# Patient Record
Sex: MALE | Race: BLACK OR AFRICAN AMERICAN | Employment: UNEMPLOYED | URBAN - NONMETROPOLITAN AREA
[De-identification: names, ages, dates, MRNs, and addresses within clinical notes are randomized per-mention and may not be internally consistent; named-entity substitution may affect disease eponyms.]

---

## 2018-09-13 ENCOUNTER — APPOINTMENT (OUTPATIENT)
Dept: CT IMAGING | Age: 48
End: 2018-09-13
Payer: MEDICARE

## 2018-09-13 ENCOUNTER — HOSPITAL ENCOUNTER (EMERGENCY)
Age: 48
Discharge: PSYCHIATRIC OR SUBSTANCE USE FACILITY | End: 2018-09-13
Attending: EMERGENCY MEDICINE
Payer: MEDICARE

## 2018-09-13 VITALS
HEIGHT: 77 IN | OXYGEN SATURATION: 99 % | RESPIRATION RATE: 17 BRPM | SYSTOLIC BLOOD PRESSURE: 137 MMHG | BODY MASS INDEX: 22.43 KG/M2 | HEART RATE: 99 BPM | WEIGHT: 190 LBS | DIASTOLIC BLOOD PRESSURE: 99 MMHG | TEMPERATURE: 97.7 F

## 2018-09-13 DIAGNOSIS — K59.00 CONSTIPATION, UNSPECIFIED CONSTIPATION TYPE: Primary | ICD-10-CM

## 2018-09-13 LAB
ALBUMIN SERPL-MCNC: 3.7 G/DL (ref 3.5–5.1)
ALP BLD-CCNC: 60 U/L (ref 38–126)
ALT SERPL-CCNC: 12 U/L (ref 11–66)
AMPHETAMINE+METHAMPHETAMINE URINE SCREEN: NEGATIVE
ANION GAP SERPL CALCULATED.3IONS-SCNC: 10 MEQ/L (ref 8–16)
AST SERPL-CCNC: 16 U/L (ref 5–40)
BACTERIA: NORMAL /HPF
BARBITURATE QUANTITATIVE URINE: NEGATIVE
BASOPHILS # BLD: 0.5 %
BASOPHILS ABSOLUTE: 0 THOU/MM3 (ref 0–0.1)
BENZODIAZEPINE QUANTITATIVE URINE: NEGATIVE
BILIRUB SERPL-MCNC: 0.3 MG/DL (ref 0.3–1.2)
BILIRUBIN DIRECT: < 0.2 MG/DL (ref 0–0.3)
BILIRUBIN URINE: NEGATIVE
BLOOD, URINE: NEGATIVE
BUN BLDV-MCNC: 16 MG/DL (ref 7–22)
CALCIUM SERPL-MCNC: 9.6 MG/DL (ref 8.5–10.5)
CANNABINOID QUANTITATIVE URINE: POSITIVE
CASTS 2: NORMAL /LPF
CASTS UA: NORMAL /LPF
CHARACTER, URINE: CLEAR
CHLORIDE BLD-SCNC: 101 MEQ/L (ref 98–111)
CO2: 25 MEQ/L (ref 23–33)
COCAINE METABOLITE QUANTITATIVE URINE: NEGATIVE
COLOR: YELLOW
CREAT SERPL-MCNC: 1 MG/DL (ref 0.4–1.2)
CRYSTALS, UA: NORMAL
EOSINOPHIL # BLD: 2.9 %
EOSINOPHILS ABSOLUTE: 0.1 THOU/MM3 (ref 0–0.4)
EPITHELIAL CELLS, UA: NORMAL /HPF
ERYTHROCYTE [DISTWIDTH] IN BLOOD BY AUTOMATED COUNT: 13.2 % (ref 11.5–14.5)
ERYTHROCYTE [DISTWIDTH] IN BLOOD BY AUTOMATED COUNT: 45.5 FL (ref 35–45)
ETHYL ALCOHOL, SERUM: < 0.01 %
GFR SERPL CREATININE-BSD FRML MDRD: > 90 ML/MIN/1.73M2
GLUCOSE BLD-MCNC: 80 MG/DL (ref 70–108)
GLUCOSE URINE: NEGATIVE MG/DL
HCT VFR BLD CALC: 41.1 % (ref 42–52)
HEMOGLOBIN: 13.6 GM/DL (ref 14–18)
IMMATURE GRANS (ABS): 0.01 THOU/MM3 (ref 0–0.07)
IMMATURE GRANULOCYTES: 0.2 %
KETONES, URINE: NEGATIVE
LEUKOCYTE ESTERASE, URINE: NEGATIVE
LIPASE: 11.2 U/L (ref 5.6–51.3)
LYMPHOCYTES # BLD: 41.6 %
LYMPHOCYTES ABSOLUTE: 1.8 THOU/MM3 (ref 1–4.8)
MAGNESIUM: 2.2 MG/DL (ref 1.6–2.4)
MCH RBC QN AUTO: 31.4 PG (ref 26–33)
MCHC RBC AUTO-ENTMCNC: 33.1 GM/DL (ref 32.2–35.5)
MCV RBC AUTO: 94.9 FL (ref 80–94)
MISCELLANEOUS 2: NORMAL
MONOCYTES # BLD: 8.8 %
MONOCYTES ABSOLUTE: 0.4 THOU/MM3 (ref 0.4–1.3)
NITRITE, URINE: NEGATIVE
NUCLEATED RED BLOOD CELLS: 0 /100 WBC
OPIATES, URINE: NEGATIVE
OSMOLALITY CALCULATION: 272.1 MOSMOL/KG (ref 275–300)
OXYCODONE: NEGATIVE
PH UA: 8
PHENCYCLIDINE QUANTITATIVE URINE: NEGATIVE
PLATELET # BLD: 241 THOU/MM3 (ref 130–400)
PMV BLD AUTO: 9.4 FL (ref 9.4–12.4)
POTASSIUM SERPL-SCNC: 4.4 MEQ/L (ref 3.5–5.2)
PROTEIN UA: NEGATIVE
RBC # BLD: 4.33 MILL/MM3 (ref 4.7–6.1)
RBC URINE: NORMAL /HPF
RENAL EPITHELIAL, UA: NORMAL
SEG NEUTROPHILS: 46 %
SEGMENTED NEUTROPHILS ABSOLUTE COUNT: 2 THOU/MM3 (ref 1.8–7.7)
SODIUM BLD-SCNC: 136 MEQ/L (ref 135–145)
SPECIFIC GRAVITY, URINE: 1.01 (ref 1–1.03)
TOTAL PROTEIN: 8.3 G/DL (ref 6.1–8)
TROPONIN T: < 0.01 NG/ML
UROBILINOGEN, URINE: 0.2 EU/DL
WBC # BLD: 4.4 THOU/MM3 (ref 4.8–10.8)
WBC UA: NORMAL /HPF
YEAST: NORMAL

## 2018-09-13 PROCEDURE — 84484 ASSAY OF TROPONIN QUANT: CPT

## 2018-09-13 PROCEDURE — 80307 DRUG TEST PRSMV CHEM ANLYZR: CPT

## 2018-09-13 PROCEDURE — 85025 COMPLETE CBC W/AUTO DIFF WBC: CPT

## 2018-09-13 PROCEDURE — 99284 EMERGENCY DEPT VISIT MOD MDM: CPT

## 2018-09-13 PROCEDURE — 6360000002 HC RX W HCPCS: Performed by: EMERGENCY MEDICINE

## 2018-09-13 PROCEDURE — 82248 BILIRUBIN DIRECT: CPT

## 2018-09-13 PROCEDURE — 36415 COLL VENOUS BLD VENIPUNCTURE: CPT

## 2018-09-13 PROCEDURE — 83735 ASSAY OF MAGNESIUM: CPT

## 2018-09-13 PROCEDURE — 74176 CT ABD & PELVIS W/O CONTRAST: CPT

## 2018-09-13 PROCEDURE — G0480 DRUG TEST DEF 1-7 CLASSES: HCPCS

## 2018-09-13 PROCEDURE — 81001 URINALYSIS AUTO W/SCOPE: CPT

## 2018-09-13 PROCEDURE — 83690 ASSAY OF LIPASE: CPT

## 2018-09-13 PROCEDURE — 80053 COMPREHEN METABOLIC PANEL: CPT

## 2018-09-13 PROCEDURE — 6370000000 HC RX 637 (ALT 250 FOR IP): Performed by: EMERGENCY MEDICINE

## 2018-09-13 RX ORDER — AMLODIPINE BESYLATE 10 MG/1
10 TABLET ORAL DAILY
COMMUNITY

## 2018-09-13 RX ORDER — RAMELTEON 8 MG/1
8 TABLET ORAL NIGHTLY
COMMUNITY

## 2018-09-13 RX ORDER — PANTOPRAZOLE SODIUM 40 MG/1
40 TABLET, DELAYED RELEASE ORAL ONCE
Status: COMPLETED | OUTPATIENT
Start: 2018-09-13 | End: 2018-09-13

## 2018-09-13 RX ORDER — TAMSULOSIN HYDROCHLORIDE 0.4 MG/1
0.4 CAPSULE ORAL DAILY
COMMUNITY

## 2018-09-13 RX ORDER — BUPRENORPHINE AND NALOXONE 8; 2 MG/1; MG/1
1 FILM, SOLUBLE BUCCAL; SUBLINGUAL DAILY
COMMUNITY

## 2018-09-13 RX ORDER — POLYETHYLENE GLYCOL 3350 17 G/17G
17 POWDER, FOR SOLUTION ORAL DAILY PRN
Qty: 527 G | Refills: 1 | Status: SHIPPED | OUTPATIENT
Start: 2018-09-13 | End: 2018-10-13

## 2018-09-13 RX ORDER — SODIUM PHOSPHATE,MONO-DIBASIC 19G-7G/118
1 ENEMA (ML) RECTAL ONCE
Status: COMPLETED | OUTPATIENT
Start: 2018-09-13 | End: 2018-09-13

## 2018-09-13 RX ORDER — ASPIRIN 81 MG/1
81 TABLET, CHEWABLE ORAL DAILY
COMMUNITY

## 2018-09-13 RX ORDER — SODIUM PHOSPHATE, DIBASIC AND SODIUM PHOSPHATE, MONOBASIC 7; 19 G/133ML; G/133ML
1 ENEMA RECTAL
COMMUNITY

## 2018-09-13 RX ORDER — ONDANSETRON 4 MG/1
4 TABLET, ORALLY DISINTEGRATING ORAL ONCE
Status: COMPLETED | OUTPATIENT
Start: 2018-09-13 | End: 2018-09-13

## 2018-09-13 RX ORDER — CYCLOBENZAPRINE HCL 10 MG
10 TABLET ORAL 3 TIMES DAILY PRN
COMMUNITY

## 2018-09-13 RX ORDER — TRAZODONE HYDROCHLORIDE 100 MG/1
25 TABLET ORAL NIGHTLY
COMMUNITY

## 2018-09-13 RX ORDER — SENNA AND DOCUSATE SODIUM 50; 8.6 MG/1; MG/1
1 TABLET, FILM COATED ORAL DAILY
COMMUNITY

## 2018-09-13 RX ADMIN — MAGESIUM CITRATE 296 ML: 1.75 LIQUID ORAL at 07:16

## 2018-09-13 RX ADMIN — PANTOPRAZOLE SODIUM 40 MG: 40 TABLET, DELAYED RELEASE ORAL at 05:28

## 2018-09-13 RX ADMIN — SODIUM PHOSPHATE 1 ENEMA: 7; 19 ENEMA RECTAL at 07:17

## 2018-09-13 RX ADMIN — ONDANSETRON 4 MG: 4 TABLET, ORALLY DISINTEGRATING ORAL at 05:28

## 2018-09-13 ASSESSMENT — ENCOUNTER SYMPTOMS
CHEST TIGHTNESS: 0
EYE REDNESS: 0
EYE ITCHING: 0
VOICE CHANGE: 0
ABDOMINAL PAIN: 1
COUGH: 0
WHEEZING: 0
SHORTNESS OF BREATH: 0
ABDOMINAL DISTENTION: 1
RHINORRHEA: 0
SORE THROAT: 0
CHOKING: 0
SINUS PRESSURE: 0
EYE PAIN: 0
NAUSEA: 0
PHOTOPHOBIA: 0
EYE DISCHARGE: 0
BLOOD IN STOOL: 0
CONSTIPATION: 1
DIARRHEA: 0
VOMITING: 0
TROUBLE SWALLOWING: 0
BACK PAIN: 0

## 2018-09-13 ASSESSMENT — PAIN DESCRIPTION - PAIN TYPE: TYPE: ACUTE PAIN

## 2018-09-13 ASSESSMENT — PAIN DESCRIPTION - DESCRIPTORS: DESCRIPTORS: ACHING

## 2018-09-13 ASSESSMENT — PAIN SCALES - GENERAL: PAINLEVEL_OUTOF10: 9

## 2018-09-13 ASSESSMENT — PAIN DESCRIPTION - LOCATION: LOCATION: ABDOMEN

## 2018-09-13 ASSESSMENT — PAIN DESCRIPTION - ORIENTATION: ORIENTATION: LOWER

## 2018-09-13 NOTE — ED NOTES
Fleet enema complete. Patient tolerated well and is on the bedside commode. [place] called and transportation is coming for patient.       Patrice Huber RN  09/13/18 0977
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
## 2023-11-15 ENCOUNTER — HOSPITAL ENCOUNTER (OUTPATIENT)
Age: 53
Setting detail: SPECIMEN
Discharge: HOME | End: 2023-11-15

## 2023-11-15 LAB
BASOPHILS # BLD: <0.03 K/UL (ref 0–0.2)
BASOPHILS NFR BLD: 1 % (ref 0–2)
EOSINOPHIL # BLD: 0.04 K/UL (ref 0–0.44)
EOSINOPHILS RELATIVE PERCENT: 1 % (ref 1–4)
ERYTHROCYTE [DISTWIDTH] IN BLOOD BY AUTOMATED COUNT: 13.2 % (ref 11.8–14.4)
HCT VFR BLD AUTO: 45.3 % (ref 40.7–50.3)
HGB BLD-MCNC: 14.6 G/DL (ref 13–17)
IMM GRANULOCYTES # BLD AUTO: <0.03 K/UL (ref 0–0.3)
IMM GRANULOCYTES NFR BLD: 0 %
LYMPHOCYTES NFR BLD: 1.49 K/UL (ref 1.1–3.7)
LYMPHOCYTES RELATIVE PERCENT: 42 % (ref 24–43)
MCH RBC QN AUTO: 31.3 PG (ref 25.2–33.5)
MCHC RBC AUTO-ENTMCNC: 32.2 G/DL (ref 28.4–34.8)
MCV RBC AUTO: 97.2 FL (ref 82.6–102.9)
MONOCYTES NFR BLD: 0.28 K/UL (ref 0.1–1.2)
MONOCYTES NFR BLD: 8 % (ref 3–12)
NEUTROPHILS NFR BLD: 48 % (ref 36–65)
NEUTS SEG NFR BLD: 1.7 K/UL (ref 1.5–8.1)
NRBC BLD-RTO: 0 PER 100 WBC
PLATELET # BLD AUTO: 244 K/UL (ref 138–453)
PMV BLD AUTO: 10 FL (ref 8.1–13.5)
RBC # BLD AUTO: 4.66 M/UL (ref 4.21–5.77)
WBC OTHER # BLD: 3.5 K/UL (ref 3.5–11.3)

## 2023-11-16 LAB
25(OH)D3 SERPL-MCNC: 16.8 NG/ML (ref 30–100)
ALBUMIN SERPL-MCNC: 4.3 G/DL (ref 3.5–5.2)
ALBUMIN/GLOB SERPL: 1 {RATIO} (ref 1–2.5)
ALP SERPL-CCNC: 74 U/L (ref 40–129)
ALT SERPL-CCNC: 12 U/L (ref 10–50)
ANION GAP SERPL CALCULATED.3IONS-SCNC: 10 MMOL/L (ref 9–16)
AST SERPL-CCNC: 17 U/L (ref 10–50)
BILIRUB SERPL-MCNC: 0.2 MG/DL (ref 0–1.2)
BUN SERPL-MCNC: 16 MG/DL (ref 6–20)
CALCIUM SERPL-MCNC: 9.2 MG/DL (ref 8.6–10.4)
CHLORIDE SERPL-SCNC: 106 MMOL/L (ref 98–107)
CHOLEST SERPL-MCNC: 152 MG/DL (ref 0–199)
CHOLESTEROL/HDL RATIO: 3
CO2 SERPL-SCNC: 23 MMOL/L (ref 20–31)
CREAT SERPL-MCNC: 0.8 MG/DL (ref 0.7–1.2)
FOLATE SERPL-MCNC: 3.4 NG/ML (ref 4.8–24.2)
GFR SERPL CREATININE-BSD FRML MDRD: >60 ML/MIN/1.73M2
GLUCOSE SERPL-MCNC: 93 MG/DL (ref 74–99)
HCV RNA # SERPL NAA+PROBE: NOT DETECTED {COPIES}/ML
HDLC SERPL-MCNC: 44 MG/DL
LDLC SERPL CALC-MCNC: 96 MG/DL (ref 0–100)
MAGNESIUM SERPL-MCNC: 1.8 MG/DL (ref 1.6–2.6)
POTASSIUM SERPL-SCNC: 4.4 MMOL/L (ref 3.7–5.3)
PROT SERPL-MCNC: 7.3 G/DL (ref 6.6–8.7)
SODIUM SERPL-SCNC: 139 MMOL/L (ref 136–145)
SPECIMEN SOURCE: NORMAL
TRIGL SERPL-MCNC: 61 MG/DL (ref 0–149)
TSH SERPL DL<=0.05 MIU/L-ACNC: 4 UIU/ML (ref 0.27–4.2)
VIT B12 SERPL-MCNC: 482 PG/ML (ref 232–1245)
VLDLC SERPL CALC-MCNC: 12 MG/DL

## 2023-11-17 LAB
AMPHETAMINE: NEGATIVE NG/ML
BARBITURATES: NEGATIVE NG/ML
BENZODIAZEPINES: POSITIVE NG/ML
BUPRENORPHINE: NEGATIVE NG/ML
COCAINE: NEGATIVE NG/ML
DRUGS OF ABUSE COMMENT: NORMAL
METHADONE: NEGATIVE NG/ML
METHAMPHETAMINE: NEGATIVE NG/ML
OPIATES: NEGATIVE NG/ML
OXYCODONE: POSITIVE NG/ML
PHENCYCLIDINE: NEGATIVE NG/ML
THC: POSITIVE NG/ML

## 2023-11-19 LAB — CANNABINOID, BLOOD: 10 NG/ML

## 2023-11-21 LAB
CODEINE: <2 NG/ML
HYDROCODONE: <2 NG/ML
HYDROMORPHONE: <2 NG/ML
MORPHINE: <2 NG/ML
OPIATES, BLOOD: <2 NG/ML
OXYCODONE: 63 NG/ML
OXYMORPHONE: <2 NG/ML

## 2023-11-22 LAB
7-AMINOCLONAZEPAM: <5 NG/ML
ALPHA HYDROXYALPRAZOLAM: <5 NG/ML
ALPRAZOLAM: 29 NG/ML
CHLORDIAZEPOXIDE: <20 NG/ML
CLONAZEPAM: <5 NG/ML
DIAZEPAM: <5 NG/ML
LORAZEPAM: <20 NG/ML
MIDAZOLAM: <20 NG/ML
MIDAZOLAM: <20 NG/ML
NORDIAZEPAM: <20 NG/ML
OXAZEPAM: <20 NG/ML
TEMAZEPAM: <20 NG/ML

## 2023-12-06 ENCOUNTER — HOSPITAL ENCOUNTER (EMERGENCY)
Age: 53
Discharge: HOME | End: 2023-12-06
Attending: EMERGENCY MEDICINE
Payer: MEDICARE

## 2023-12-06 VITALS
TEMPERATURE: 97.8 F | SYSTOLIC BLOOD PRESSURE: 156 MMHG | RESPIRATION RATE: 27 BRPM | BODY MASS INDEX: 22.43 KG/M2 | HEIGHT: 77 IN | WEIGHT: 190 LBS | HEART RATE: 90 BPM | DIASTOLIC BLOOD PRESSURE: 108 MMHG | OXYGEN SATURATION: 96 %

## 2023-12-06 DIAGNOSIS — G89.18 POST-OPERATIVE PAIN: Primary | ICD-10-CM

## 2023-12-06 PROCEDURE — 99283 EMERGENCY DEPT VISIT LOW MDM: CPT

## 2023-12-06 PROCEDURE — 6370000000 HC RX 637 (ALT 250 FOR IP): Performed by: EMERGENCY MEDICINE

## 2023-12-06 RX ORDER — OXYCODONE HYDROCHLORIDE AND ACETAMINOPHEN 5; 325 MG/1; MG/1
2 TABLET ORAL ONCE
Status: COMPLETED | OUTPATIENT
Start: 2023-12-06 | End: 2023-12-06

## 2023-12-06 RX ADMIN — OXYCODONE HYDROCHLORIDE AND ACETAMINOPHEN 2 TABLET: 5; 325 TABLET ORAL at 04:56

## 2023-12-06 ASSESSMENT — PAIN - FUNCTIONAL ASSESSMENT: PAIN_FUNCTIONAL_ASSESSMENT: 0-10

## 2023-12-06 ASSESSMENT — PAIN DESCRIPTION - ORIENTATION
ORIENTATION: LEFT
ORIENTATION: LEFT

## 2023-12-06 ASSESSMENT — PAIN SCALES - GENERAL
PAINLEVEL_OUTOF10: 7
PAINLEVEL_OUTOF10: 8

## 2023-12-06 ASSESSMENT — PAIN DESCRIPTION - DESCRIPTORS: DESCRIPTORS: THROBBING

## 2023-12-06 ASSESSMENT — PAIN DESCRIPTION - LOCATION
LOCATION: FOOT
LOCATION: FOOT

## 2023-12-06 NOTE — ED TRIAGE NOTES
Pt presents to the ED via EMS from [address] [place] [address] with c/o left foot pain, rated 7/10, throbbing pain especially when elevated. Pain started today about 6 hours ago. Pt gets around via wheelchair. 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 Pt has wound vac on left foot that has been there for approximately 2 weeks due to debridement in the left foot which was performed at [facility]. Pt A&O x4, VSS.
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
## 2023-12-06 NOTE — ED NOTES
This RN spoke with Kiana Burt who is the nurse taking care of the pt at [facility]. Per facility nurse, pt requested to come to the ED due to concerns with pain and swelling. Pt has had wound vac for approx. 2 weeks with a debridement a week ago. Per nurse pt requested to come to [address] due to other hospital giving him a medication that he is allergic to.        Eric Barthel, OTONIEL  12/06/23 1945

## 2023-12-06 NOTE — ED NOTES
Pt resting in bed. Pt has no requests at this time. Pt medicated per MAR. Pt updated on transport back to [facility].      Olene Boeck, RN  12/06/23 2255

## 2023-12-06 NOTE — DISCHARGE INSTRUCTIONS
You were seen for postoperative pain. No evidence of significant infection or abnormality was seen on physical exam.  We provided you with pain medication. Please follow-up with your surgeon. They will be the ones to continue to monitor the postsurgical site and the osteomyelitis. No evidence of penicillin or lisinopril are on your MAR at this time.

## 2024-02-27 ENCOUNTER — APPOINTMENT (OUTPATIENT)
Dept: CT IMAGING | Age: 54
End: 2024-02-27
Payer: MEDICARE

## 2024-02-27 ENCOUNTER — APPOINTMENT (OUTPATIENT)
Dept: GENERAL RADIOLOGY | Age: 54
End: 2024-02-27
Payer: MEDICARE

## 2024-02-27 ENCOUNTER — HOSPITAL ENCOUNTER (EMERGENCY)
Age: 54
Discharge: HOME | End: 2024-02-27
Attending: STUDENT IN AN ORGANIZED HEALTH CARE EDUCATION/TRAINING PROGRAM
Payer: MEDICARE

## 2024-02-27 VITALS
SYSTOLIC BLOOD PRESSURE: 152 MMHG | BODY MASS INDEX: 23.62 KG/M2 | RESPIRATION RATE: 28 BRPM | OXYGEN SATURATION: 97 % | HEIGHT: 77 IN | TEMPERATURE: 99 F | HEART RATE: 101 BPM | DIASTOLIC BLOOD PRESSURE: 96 MMHG | WEIGHT: 200 LBS

## 2024-02-27 DIAGNOSIS — F11.93 OPIOID WITHDRAWAL (HCC): Primary | ICD-10-CM

## 2024-02-27 LAB
ALBUMIN SERPL BCG-MCNC: 4.3 G/DL (ref 3.5–5.1)
ALP SERPL-CCNC: 76 U/L (ref 38–126)
ALT SERPL W/O P-5'-P-CCNC: 23 U/L (ref 11–66)
AMPHETAMINES UR QL SCN: NEGATIVE
ANION GAP SERPL CALC-SCNC: 13 MEQ/L (ref 8–16)
AST SERPL-CCNC: 27 U/L (ref 5–40)
BACTERIA URNS QL MICRO: ABNORMAL /HPF
BARBITURATES UR QL SCN: NEGATIVE
BASOPHILS ABSOLUTE: 0 THOU/MM3 (ref 0–0.1)
BASOPHILS NFR BLD AUTO: 0.2 %
BENZODIAZ UR QL SCN: NEGATIVE
BILIRUB SERPL-MCNC: 0.5 MG/DL (ref 0.3–1.2)
BILIRUB UR QL STRIP.AUTO: NEGATIVE
BUN SERPL-MCNC: 14 MG/DL (ref 7–22)
BZE UR QL SCN: POSITIVE
CALCIUM SERPL-MCNC: 9.7 MG/DL (ref 8.5–10.5)
CANNABINOIDS UR QL SCN: POSITIVE
CASTS #/AREA URNS LPF: ABNORMAL /LPF
CASTS 2: ABNORMAL /LPF
CHARACTER UR: CLEAR
CHLORIDE SERPL-SCNC: 99 MEQ/L (ref 98–111)
CO2 SERPL-SCNC: 24 MEQ/L (ref 23–33)
COLOR: YELLOW
CREAT SERPL-MCNC: 1.2 MG/DL (ref 0.4–1.2)
CRYSTALS URNS MICRO: ABNORMAL
DEPRECATED RDW RBC AUTO: 46.2 FL (ref 35–45)
EOSINOPHIL NFR BLD AUTO: 0 %
EOSINOPHILS ABSOLUTE: 0 THOU/MM3 (ref 0–0.4)
EPITHELIAL CELLS, UA: ABNORMAL /HPF
ERYTHROCYTE [DISTWIDTH] IN BLOOD BY AUTOMATED COUNT: 13.1 % (ref 11.5–14.5)
ETHANOL SERPL-MCNC: < 0.01 %
FENTANYL: NEGATIVE
FLUAV RNA RESP QL NAA+PROBE: NOT DETECTED
FLUBV RNA RESP QL NAA+PROBE: NOT DETECTED
GFR SERPL CREATININE-BSD FRML MDRD: > 60 ML/MIN/1.73M2
GLUCOSE SERPL-MCNC: 112 MG/DL (ref 70–108)
GLUCOSE UR QL STRIP.AUTO: NEGATIVE MG/DL
HCT VFR BLD AUTO: 46.6 % (ref 42–52)
HGB BLD-MCNC: 15.1 GM/DL (ref 14–18)
HGB UR QL STRIP.AUTO: NEGATIVE
IMM GRANULOCYTES # BLD AUTO: 0.07 THOU/MM3 (ref 0–0.07)
IMM GRANULOCYTES NFR BLD AUTO: 0.5 %
KETONES UR QL STRIP.AUTO: ABNORMAL
LACTIC ACID, SEPSIS: 1.7 MMOL/L (ref 0.5–1.9)
LIPASE SERPL-CCNC: 12.6 U/L (ref 5.6–51.3)
LYMPHOCYTES ABSOLUTE: 1.4 THOU/MM3 (ref 1–4.8)
LYMPHOCYTES NFR BLD AUTO: 11.3 %
MAGNESIUM SERPL-MCNC: 1.7 MG/DL (ref 1.6–2.4)
MCH RBC QN AUTO: 30.9 PG (ref 26–33)
MCHC RBC AUTO-ENTMCNC: 32.4 GM/DL (ref 32.2–35.5)
MCV RBC AUTO: 95.5 FL (ref 80–94)
MISCELLANEOUS 2: ABNORMAL
MONOCYTES ABSOLUTE: 1.3 THOU/MM3 (ref 0.4–1.3)
MONOCYTES NFR BLD AUTO: 10.3 %
NEUTROPHILS NFR BLD AUTO: 77.7 %
NITRITE UR QL STRIP: NEGATIVE
NRBC BLD AUTO-RTO: 0 /100 WBC
OPIATES UR QL SCN: NEGATIVE
OSMOLALITY SERPL CALC.SUM OF ELEC: 273.2 MOSMOL/KG (ref 275–300)
OXYCODONE: POSITIVE
PCP UR QL SCN: NEGATIVE
PH UR STRIP.AUTO: 6 [PH] (ref 5–9)
PLATELET # BLD AUTO: 245 THOU/MM3 (ref 130–400)
PMV BLD AUTO: 9.8 FL (ref 9.4–12.4)
POTASSIUM SERPL-SCNC: 3.5 MEQ/L (ref 3.5–5.2)
PROCALCITONIN SERPL IA-MCNC: 1.29 NG/ML (ref 0.01–0.09)
PROT SERPL-MCNC: 8.7 G/DL (ref 6.1–8)
PROT UR STRIP.AUTO-MCNC: ABNORMAL MG/DL
RBC # BLD AUTO: 4.88 MILL/MM3 (ref 4.7–6.1)
RBC URINE: ABNORMAL /HPF
RENAL EPI CELLS #/AREA URNS HPF: ABNORMAL /[HPF]
SARS-COV-2 RNA RESP QL NAA+PROBE: NOT DETECTED
SEGMENTED NEUTROPHILS ABSOLUTE COUNT: 9.9 THOU/MM3 (ref 1.8–7.7)
SODIUM SERPL-SCNC: 136 MEQ/L (ref 135–145)
SP GR UR REFRACT.AUTO: 1.02 (ref 1–1.03)
T4 FREE SERPL-MCNC: 1.16 NG/DL (ref 0.93–1.68)
TROPONIN, HIGH SENSITIVITY: 14 NG/L (ref 0–12)
TSH SERPL DL<=0.005 MIU/L-ACNC: 1.29 UIU/ML (ref 0.4–4.2)
UROBILINOGEN, URINE: 0.2 EU/DL (ref 0–1)
WBC # BLD AUTO: 12.7 THOU/MM3 (ref 4.8–10.8)
WBC #/AREA URNS HPF: ABNORMAL /HPF
WBC #/AREA URNS HPF: NEGATIVE /[HPF]
YEAST LIKE FUNGI URNS QL MICRO: ABNORMAL

## 2024-02-27 PROCEDURE — 83690 ASSAY OF LIPASE: CPT

## 2024-02-27 PROCEDURE — 71250 CT THORAX DX C-: CPT

## 2024-02-27 PROCEDURE — 87636 SARSCOV2 & INF A&B AMP PRB: CPT

## 2024-02-27 PROCEDURE — 71046 X-RAY EXAM CHEST 2 VIEWS: CPT

## 2024-02-27 PROCEDURE — 83735 ASSAY OF MAGNESIUM: CPT

## 2024-02-27 PROCEDURE — 84484 ASSAY OF TROPONIN QUANT: CPT

## 2024-02-27 PROCEDURE — 96360 HYDRATION IV INFUSION INIT: CPT

## 2024-02-27 PROCEDURE — 81001 URINALYSIS AUTO W/SCOPE: CPT

## 2024-02-27 PROCEDURE — 82077 ASSAY SPEC XCP UR&BREATH IA: CPT

## 2024-02-27 PROCEDURE — 83605 ASSAY OF LACTIC ACID: CPT

## 2024-02-27 PROCEDURE — 80053 COMPREHEN METABOLIC PANEL: CPT

## 2024-02-27 PROCEDURE — 74177 CT ABD & PELVIS W/CONTRAST: CPT

## 2024-02-27 PROCEDURE — 2580000003 HC RX 258: Performed by: EMERGENCY MEDICINE

## 2024-02-27 PROCEDURE — 96361 HYDRATE IV INFUSION ADD-ON: CPT

## 2024-02-27 PROCEDURE — 84145 PROCALCITONIN (PCT): CPT

## 2024-02-27 PROCEDURE — 36415 COLL VENOUS BLD VENIPUNCTURE: CPT

## 2024-02-27 PROCEDURE — 84443 ASSAY THYROID STIM HORMONE: CPT

## 2024-02-27 PROCEDURE — 87040 BLOOD CULTURE FOR BACTERIA: CPT

## 2024-02-27 PROCEDURE — 80307 DRUG TEST PRSMV CHEM ANLYZR: CPT

## 2024-02-27 PROCEDURE — 85025 COMPLETE CBC W/AUTO DIFF WBC: CPT

## 2024-02-27 PROCEDURE — 84439 ASSAY OF FREE THYROXINE: CPT

## 2024-02-27 PROCEDURE — 6360000004 HC RX CONTRAST MEDICATION: Performed by: STUDENT IN AN ORGANIZED HEALTH CARE EDUCATION/TRAINING PROGRAM

## 2024-02-27 PROCEDURE — 93005 ELECTROCARDIOGRAM TRACING: CPT | Performed by: EMERGENCY MEDICINE

## 2024-02-27 PROCEDURE — 99285 EMERGENCY DEPT VISIT HI MDM: CPT

## 2024-02-27 PROCEDURE — 6370000000 HC RX 637 (ALT 250 FOR IP): Performed by: EMERGENCY MEDICINE

## 2024-02-27 RX ORDER — 0.9 % SODIUM CHLORIDE 0.9 %
1000 INTRAVENOUS SOLUTION INTRAVENOUS ONCE
Status: COMPLETED | OUTPATIENT
Start: 2024-02-27 | End: 2024-02-27

## 2024-02-27 RX ORDER — OXYCODONE HYDROCHLORIDE AND ACETAMINOPHEN 5; 325 MG/1; MG/1
1 TABLET ORAL ONCE
Status: COMPLETED | OUTPATIENT
Start: 2024-02-27 | End: 2024-02-27

## 2024-02-27 RX ORDER — ACETAMINOPHEN 325 MG/1
650 TABLET ORAL ONCE
Status: COMPLETED | OUTPATIENT
Start: 2024-02-27 | End: 2024-02-27

## 2024-02-27 RX ADMIN — IOPAMIDOL 80 ML: 755 INJECTION, SOLUTION INTRAVENOUS at 12:00

## 2024-02-27 RX ADMIN — ACETAMINOPHEN 650 MG: 325 TABLET ORAL at 10:49

## 2024-02-27 RX ADMIN — OXYCODONE HYDROCHLORIDE AND ACETAMINOPHEN 1 TABLET: 5; 325 TABLET ORAL at 11:17

## 2024-02-27 RX ADMIN — SODIUM CHLORIDE 1000 ML: 9 INJECTION, SOLUTION INTRAVENOUS at 11:17

## 2024-02-27 ASSESSMENT — PAIN SCALES - GENERAL
PAINLEVEL_OUTOF10: 7
PAINLEVEL_OUTOF10: 10
PAINLEVEL_OUTOF10: 10

## 2024-02-27 ASSESSMENT — PAIN DESCRIPTION - PAIN TYPE
TYPE: CHRONIC PAIN
TYPE: ACUTE PAIN;CHRONIC PAIN

## 2024-02-27 ASSESSMENT — PAIN - FUNCTIONAL ASSESSMENT
PAIN_FUNCTIONAL_ASSESSMENT: 0-10
PAIN_FUNCTIONAL_ASSESSMENT: 0-10

## 2024-02-27 ASSESSMENT — PAIN DESCRIPTION - LOCATION: LOCATION: GENERALIZED
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
## 2024-02-27 NOTE — DISCHARGE INSTRUCTIONS
Take your medication as indicated and prescribed.  Recommend following up with your infectious disease doctor as well as your primary care provider.  If you wish to discontinue your oxycodone I recommend that you see your primary care provider to help you    PLEASE RETURN TO THE EMERGENCY DEPARTMENT IMMEDIATELY for worsening symptoms, inability to have a bowel movement or if you develop any concerning symptoms such as: high fever not relieved by acetaminophen (Tylenol) and/or ibuprofen (Motrin / Advil), chills, shortness of breath, chest pain, feeling of your heart fluttering or racing, persistent nausea and/or vomiting, vomiting up blood, blood in your stool, numbness, loss of consciousness, weakness or tingling in the arms or legs or change in color of the extremities, changes in mental status, persistent headache, blurry vision, loss of bladder / bowel control, unable to follow up with your physician, or other any other care or concern.

## 2024-02-27 NOTE — ED TRIAGE NOTES
Pt presents to the ED by EMS from home with c/c fever, fatigue, generalized pain. Pt reports fatigue x 2 days. Reports taking oxycodone for pain management and has not taken medication for 2+ days. Pt rates pain 12/10. EKG completed on arrival.

## 2024-02-28 LAB
EKG ATRIAL RATE: 110 BPM
EKG P AXIS: 65 DEGREES
EKG P-R INTERVAL: 146 MS
EKG Q-T INTERVAL: 330 MS
EKG QRS DURATION: 90 MS
EKG QTC CALCULATION (BAZETT): 446 MS
EKG R AXIS: 59 DEGREES
EKG T AXIS: 69 DEGREES
EKG VENTRICULAR RATE: 110 BPM

## 2024-02-28 PROCEDURE — 93010 ELECTROCARDIOGRAM REPORT: CPT | Performed by: INTERNAL MEDICINE

## 2024-03-03 LAB
BACTERIA BLD AEROBE CULT: NORMAL
BACTERIA BLD AEROBE CULT: NORMAL

## 2024-10-03 ENCOUNTER — HOSPITAL ENCOUNTER (INPATIENT)
Age: 54
LOS: 10 days | Discharge: HOME | DRG: 885 | End: 2024-10-14
Attending: PSYCHIATRY & NEUROLOGY | Admitting: PSYCHIATRY & NEUROLOGY
Payer: MEDICARE

## 2024-10-03 DIAGNOSIS — M79.672 FOOT PAIN, LEFT: ICD-10-CM

## 2024-10-03 DIAGNOSIS — R45.851 SUICIDAL IDEATION: Primary | ICD-10-CM

## 2024-10-03 PROCEDURE — 99285 EMERGENCY DEPT VISIT HI MDM: CPT
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
## 2024-10-04 PROBLEM — F33.2 MDD (MAJOR DEPRESSIVE DISORDER), RECURRENT SEVERE, WITHOUT PSYCHOSIS (HCC): Status: ACTIVE | Noted: 2024-10-04

## 2024-10-04 LAB
ALBUMIN SERPL BCG-MCNC: 4 G/DL (ref 3.5–5.1)
ALP SERPL-CCNC: 81 U/L (ref 38–126)
ALT SERPL W/O P-5'-P-CCNC: 19 U/L (ref 11–66)
ANION GAP SERPL CALC-SCNC: 14 MEQ/L (ref 8–16)
APAP SERPL-MCNC: < 5 UG/ML (ref 0–20)
AST SERPL-CCNC: 31 U/L (ref 5–40)
BASOPHILS ABSOLUTE: 0 THOU/MM3 (ref 0–0.1)
BASOPHILS NFR BLD AUTO: 0.4 %
BILIRUB CONJ SERPL-MCNC: < 0.1 MG/DL (ref 0.1–13.8)
BILIRUB SERPL-MCNC: 0.7 MG/DL (ref 0.3–1.2)
BUN SERPL-MCNC: 22 MG/DL (ref 7–22)
CALCIUM SERPL-MCNC: 9.2 MG/DL (ref 8.5–10.5)
CHLORIDE SERPL-SCNC: 101 MEQ/L (ref 98–111)
CO2 SERPL-SCNC: 20 MEQ/L (ref 23–33)
CREAT SERPL-MCNC: 0.6 MG/DL (ref 0.4–1.2)
DEPRECATED RDW RBC AUTO: 47.1 FL (ref 35–45)
EOSINOPHIL NFR BLD AUTO: 2 %
EOSINOPHILS ABSOLUTE: 0.1 THOU/MM3 (ref 0–0.4)
ERYTHROCYTE [DISTWIDTH] IN BLOOD BY AUTOMATED COUNT: 13.2 % (ref 11.5–14.5)
ETHANOL SERPL-MCNC: 0.02 % (ref 0–0.08)
GFR SERPL CREATININE-BSD FRML MDRD: > 90 ML/MIN/1.73M2
GLUCOSE SERPL-MCNC: 89 MG/DL (ref 70–108)
HCT VFR BLD AUTO: 42.1 % (ref 42–52)
HGB BLD-MCNC: 13.9 GM/DL (ref 14–18)
IMM GRANULOCYTES # BLD AUTO: 0.01 THOU/MM3 (ref 0–0.07)
IMM GRANULOCYTES NFR BLD AUTO: 0.2 %
LYMPHOCYTES ABSOLUTE: 2.4 THOU/MM3 (ref 1–4.8)
LYMPHOCYTES NFR BLD AUTO: 48.4 %
MCH RBC QN AUTO: 31.7 PG (ref 26–33)
MCHC RBC AUTO-ENTMCNC: 33 GM/DL (ref 32.2–35.5)
MCV RBC AUTO: 96.1 FL (ref 80–94)
MONOCYTES ABSOLUTE: 0.4 THOU/MM3 (ref 0.4–1.3)
MONOCYTES NFR BLD AUTO: 8.2 %
NEUTROPHILS ABSOLUTE: 2 THOU/MM3 (ref 1.8–7.7)
NEUTROPHILS NFR BLD AUTO: 40.8 %
NRBC BLD AUTO-RTO: 0 /100 WBC
OSMOLALITY SERPL CALC.SUM OF ELEC: 272.9 MOSMOL/KG (ref 275–300)
PLATELET # BLD AUTO: 239 THOU/MM3 (ref 130–400)
PMV BLD AUTO: 9.9 FL (ref 9.4–12.4)
POTASSIUM SERPL-SCNC: 4.2 MEQ/L (ref 3.5–5.2)
PROT SERPL-MCNC: 7.6 G/DL (ref 6.1–8)
RBC # BLD AUTO: 4.38 MILL/MM3 (ref 4.7–6.1)
SALICYLATES SERPL-MCNC: < 0.3 MG/DL (ref 2–10)
SODIUM SERPL-SCNC: 135 MEQ/L (ref 135–145)
TSH SERPL DL<=0.005 MIU/L-ACNC: 1.21 UIU/ML (ref 0.4–4.2)
WBC # BLD AUTO: 5 THOU/MM3 (ref 4.8–10.8)

## 2024-10-04 PROCEDURE — 85025 COMPLETE CBC W/AUTO DIFF WBC: CPT

## 2024-10-04 PROCEDURE — 82077 ASSAY SPEC XCP UR&BREATH IA: CPT

## 2024-10-04 PROCEDURE — 6370000000 HC RX 637 (ALT 250 FOR IP): Performed by: PSYCHIATRY & NEUROLOGY

## 2024-10-04 PROCEDURE — 1240000000 HC EMOTIONAL WELLNESS R&B

## 2024-10-04 PROCEDURE — 84443 ASSAY THYROID STIM HORMONE: CPT

## 2024-10-04 PROCEDURE — 80143 DRUG ASSAY ACETAMINOPHEN: CPT

## 2024-10-04 PROCEDURE — 36415 COLL VENOUS BLD VENIPUNCTURE: CPT

## 2024-10-04 PROCEDURE — 6370000000 HC RX 637 (ALT 250 FOR IP)

## 2024-10-04 PROCEDURE — 82248 BILIRUBIN DIRECT: CPT

## 2024-10-04 PROCEDURE — 90792 PSYCH DIAG EVAL W/MED SRVCS: CPT | Performed by: PSYCHIATRY & NEUROLOGY

## 2024-10-04 PROCEDURE — 80179 DRUG ASSAY SALICYLATE: CPT

## 2024-10-04 PROCEDURE — 80053 COMPREHEN METABOLIC PANEL: CPT

## 2024-10-04 PROCEDURE — 6370000000 HC RX 637 (ALT 250 FOR IP): Performed by: PHYSICIAN ASSISTANT

## 2024-10-04 RX ORDER — OXYCODONE HYDROCHLORIDE 30 MG/1
30 TABLET ORAL EVERY 8 HOURS PRN
COMMUNITY
Start: 2024-09-12

## 2024-10-04 RX ORDER — FOLIC ACID 1 MG/1
1 TABLET ORAL DAILY
Status: DISCONTINUED | OUTPATIENT
Start: 2024-10-04 | End: 2024-10-14 | Stop reason: HOSPADM

## 2024-10-04 RX ORDER — MAGNESIUM 30 MG
200 TABLET ORAL 2 TIMES DAILY
Status: ON HOLD | COMMUNITY
End: 2024-10-14 | Stop reason: HOSPADM

## 2024-10-04 RX ORDER — RISPERIDONE 0.25 MG/1
0.25 TABLET ORAL 2 TIMES DAILY
Status: ON HOLD | COMMUNITY
End: 2024-10-14 | Stop reason: HOSPADM

## 2024-10-04 RX ORDER — DOCUSATE SODIUM 100 MG/1
100 CAPSULE, LIQUID FILLED ORAL 2 TIMES DAILY
Status: DISCONTINUED | OUTPATIENT
Start: 2024-10-04 | End: 2024-10-14 | Stop reason: HOSPADM

## 2024-10-04 RX ORDER — TRAZODONE HYDROCHLORIDE 50 MG/1
50 TABLET, FILM COATED ORAL NIGHTLY PRN
Status: DISCONTINUED | OUTPATIENT
Start: 2024-10-04 | End: 2024-10-14 | Stop reason: HOSPADM

## 2024-10-04 RX ORDER — TAMSULOSIN HYDROCHLORIDE 0.4 MG/1
0.4 CAPSULE ORAL DAILY
Status: DISCONTINUED | OUTPATIENT
Start: 2024-10-04 | End: 2024-10-14 | Stop reason: HOSPADM

## 2024-10-04 RX ORDER — ASPIRIN 81 MG/1
81 TABLET, CHEWABLE ORAL DAILY
Status: DISCONTINUED | OUTPATIENT
Start: 2024-10-04 | End: 2024-10-14 | Stop reason: HOSPADM

## 2024-10-04 RX ORDER — CYCLOBENZAPRINE HCL 10 MG
10 TABLET ORAL 3 TIMES DAILY PRN
Status: DISCONTINUED | OUTPATIENT
Start: 2024-10-04 | End: 2024-10-14 | Stop reason: HOSPADM

## 2024-10-04 RX ORDER — LANOLIN ALCOHOL/MO/W.PET/CERES
400 CREAM (GRAM) TOPICAL DAILY
COMMUNITY
Start: 2024-09-09

## 2024-10-04 RX ORDER — OXYCODONE HYDROCHLORIDE 15 MG/1
30 TABLET ORAL EVERY 8 HOURS PRN
Status: DISCONTINUED | OUTPATIENT
Start: 2024-10-04 | End: 2024-10-09

## 2024-10-04 RX ORDER — OMEPRAZOLE 40 MG/1
40 CAPSULE, DELAYED RELEASE ORAL DAILY
COMMUNITY
Start: 2024-09-09

## 2024-10-04 RX ORDER — RISPERIDONE 0.25 MG/1
0.25 TABLET ORAL 2 TIMES DAILY
Status: DISCONTINUED | OUTPATIENT
Start: 2024-10-04 | End: 2024-10-04

## 2024-10-04 RX ORDER — GABAPENTIN 800 MG/1
800 TABLET ORAL 3 TIMES DAILY
COMMUNITY

## 2024-10-04 RX ORDER — IBUPROFEN 800 MG/1
800 TABLET, FILM COATED ORAL 2 TIMES DAILY
COMMUNITY
Start: 2024-09-09

## 2024-10-04 RX ORDER — HYDROXYZINE HYDROCHLORIDE 25 MG/1
50 TABLET, FILM COATED ORAL 3 TIMES DAILY PRN
Status: DISCONTINUED | OUTPATIENT
Start: 2024-10-04 | End: 2024-10-14 | Stop reason: HOSPADM

## 2024-10-04 RX ORDER — ATORVASTATIN CALCIUM 10 MG/1
10 TABLET, FILM COATED ORAL NIGHTLY
Status: ON HOLD | COMMUNITY
Start: 2024-09-09 | End: 2024-10-14

## 2024-10-04 RX ORDER — METOPROLOL TARTRATE 25 MG/1
25 TABLET, FILM COATED ORAL 2 TIMES DAILY
Status: ON HOLD | COMMUNITY
Start: 2024-09-09 | End: 2024-10-14 | Stop reason: HOSPADM

## 2024-10-04 RX ORDER — LACTOBACILLUS ACIDOPHILUS 0.5 MG
1 TABLET ORAL DAILY
Status: ON HOLD | COMMUNITY
Start: 2024-09-09 | End: 2024-10-14 | Stop reason: HOSPADM

## 2024-10-04 RX ORDER — GABAPENTIN 400 MG/1
800 CAPSULE ORAL 3 TIMES DAILY
Status: DISCONTINUED | OUTPATIENT
Start: 2024-10-04 | End: 2024-10-14 | Stop reason: HOSPADM

## 2024-10-04 RX ORDER — PANTOPRAZOLE SODIUM 40 MG/1
40 TABLET, DELAYED RELEASE ORAL
Status: DISCONTINUED | OUTPATIENT
Start: 2024-10-05 | End: 2024-10-14 | Stop reason: HOSPADM

## 2024-10-04 RX ORDER — LIDOCAINE 50 MG/G
1 OINTMENT TOPICAL PRN
Status: ON HOLD | COMMUNITY
End: 2024-10-14 | Stop reason: HOSPADM

## 2024-10-04 RX ORDER — VITAMIN B COMPLEX
1 TABLET ORAL DAILY
Status: DISCONTINUED | OUTPATIENT
Start: 2024-10-04 | End: 2024-10-14 | Stop reason: HOSPADM

## 2024-10-04 RX ORDER — NICOTINE 21 MG/24HR
1 PATCH, TRANSDERMAL 24 HOURS TRANSDERMAL DAILY
Status: DISCONTINUED | OUTPATIENT
Start: 2024-10-04 | End: 2024-10-14 | Stop reason: HOSPADM

## 2024-10-04 RX ORDER — ATORVASTATIN CALCIUM 10 MG/1
10 TABLET, FILM COATED ORAL NIGHTLY
Status: DISCONTINUED | OUTPATIENT
Start: 2024-10-04 | End: 2024-10-14 | Stop reason: HOSPADM

## 2024-10-04 RX ORDER — DOCUSATE SODIUM 100 MG/1
100 CAPSULE, LIQUID FILLED ORAL 2 TIMES DAILY
COMMUNITY
Start: 2024-09-09

## 2024-10-04 RX ORDER — IBUPROFEN 400 MG/1
400 TABLET, FILM COATED ORAL EVERY 6 HOURS PRN
Status: DISCONTINUED | OUTPATIENT
Start: 2024-10-04 | End: 2024-10-09

## 2024-10-04 RX ORDER — AMLODIPINE BESYLATE 10 MG/1
10 TABLET ORAL DAILY
Status: DISCONTINUED | OUTPATIENT
Start: 2024-10-04 | End: 2024-10-14 | Stop reason: HOSPADM

## 2024-10-04 RX ORDER — ACETAMINOPHEN 325 MG/1
650 TABLET ORAL EVERY 4 HOURS PRN
Status: DISCONTINUED | OUTPATIENT
Start: 2024-10-04 | End: 2024-10-14 | Stop reason: HOSPADM

## 2024-10-04 RX ORDER — DULOXETIN HYDROCHLORIDE 30 MG/1
90 CAPSULE, DELAYED RELEASE ORAL DAILY
COMMUNITY

## 2024-10-04 RX ORDER — MAGNESIUM HYDROXIDE/ALUMINUM HYDROXICE/SIMETHICONE 120; 1200; 1200 MG/30ML; MG/30ML; MG/30ML
30 SUSPENSION ORAL EVERY 6 HOURS PRN
Status: DISCONTINUED | OUTPATIENT
Start: 2024-10-04 | End: 2024-10-14 | Stop reason: HOSPADM

## 2024-10-04 RX ORDER — CHOLECALCIFEROL (VITAMIN D3) 25 MCG
1 TABLET ORAL DAILY
COMMUNITY
Start: 2024-09-09

## 2024-10-04 RX ADMIN — OXYCODONE HYDROCHLORIDE 30 MG: 15 TABLET ORAL at 12:42

## 2024-10-04 RX ADMIN — GABAPENTIN 800 MG: 400 CAPSULE ORAL at 13:35

## 2024-10-04 RX ADMIN — TAMSULOSIN HYDROCHLORIDE 0.4 MG: 0.4 CAPSULE ORAL at 20:16

## 2024-10-04 RX ADMIN — HYDROXYZINE HYDROCHLORIDE 50 MG: 25 TABLET, FILM COATED ORAL at 14:05

## 2024-10-04 RX ADMIN — Medication 1000 UNITS: at 20:16

## 2024-10-04 RX ADMIN — DULOXETINE HYDROCHLORIDE 90 MG: 60 CAPSULE, DELAYED RELEASE ORAL at 15:30

## 2024-10-04 RX ADMIN — ASPIRIN 81 MG 81 MG: 81 TABLET ORAL at 20:18

## 2024-10-04 RX ADMIN — CYCLOBENZAPRINE 10 MG: 10 TABLET, FILM COATED ORAL at 12:28

## 2024-10-04 RX ADMIN — DOCUSATE SODIUM 100 MG: 100 CAPSULE, LIQUID FILLED ORAL at 20:18

## 2024-10-04 RX ADMIN — CYCLOBENZAPRINE 10 MG: 10 TABLET, FILM COATED ORAL at 20:18

## 2024-10-04 RX ADMIN — ATORVASTATIN CALCIUM 10 MG: 10 TABLET, FILM COATED ORAL at 20:14

## 2024-10-04 RX ADMIN — FOLIC ACID 1 MG: 1 TABLET ORAL at 20:15

## 2024-10-04 RX ADMIN — OXYCODONE HYDROCHLORIDE 30 MG: 15 TABLET ORAL at 21:51

## 2024-10-04 RX ADMIN — AMLODIPINE BESYLATE 10 MG: 10 TABLET ORAL at 20:15

## 2024-10-04 RX ADMIN — GABAPENTIN 800 MG: 400 CAPSULE ORAL at 20:14

## 2024-10-04 ASSESSMENT — PAIN DESCRIPTION - DESCRIPTORS
DESCRIPTORS: ACHING
DESCRIPTORS: ACHING;SPASM
DESCRIPTORS: ACHING
DESCRIPTORS: ACHING
DESCRIPTORS: ACHING;DISCOMFORT
DESCRIPTORS: ACHING;SHARP;SORE
DESCRIPTORS: ACHING
DESCRIPTORS: ACHING

## 2024-10-04 ASSESSMENT — PAIN - FUNCTIONAL ASSESSMENT
PAIN_FUNCTIONAL_ASSESSMENT: PREVENTS OR INTERFERES SOME ACTIVE ACTIVITIES AND ADLS
PAIN_FUNCTIONAL_ASSESSMENT: PREVENTS OR INTERFERES WITH MANY ACTIVE NOT PASSIVE ACTIVITIES
PAIN_FUNCTIONAL_ASSESSMENT: PREVENTS OR INTERFERES SOME ACTIVE ACTIVITIES AND ADLS
PAIN_FUNCTIONAL_ASSESSMENT: ACTIVITIES ARE NOT PREVENTED
PAIN_FUNCTIONAL_ASSESSMENT: PREVENTS OR INTERFERES SOME ACTIVE ACTIVITIES AND ADLS
PAIN_FUNCTIONAL_ASSESSMENT: PREVENTS OR INTERFERES WITH ALL ACTIVE AND SOME PASSIVE ACTIVITIES
PAIN_FUNCTIONAL_ASSESSMENT: PREVENTS OR INTERFERES SOME ACTIVE ACTIVITIES AND ADLS
PAIN_FUNCTIONAL_ASSESSMENT: PREVENTS OR INTERFERES SOME ACTIVE ACTIVITIES AND ADLS
PAIN_FUNCTIONAL_ASSESSMENT: PREVENTS OR INTERFERES WITH ALL ACTIVE AND SOME PASSIVE ACTIVITIES

## 2024-10-04 ASSESSMENT — PAIN DESCRIPTION - ORIENTATION
ORIENTATION: LOWER
ORIENTATION: LOWER;MID
ORIENTATION: LOWER;MID
ORIENTATION: LEFT
ORIENTATION: RIGHT;LEFT
ORIENTATION: LOWER;MID
ORIENTATION: MID;LOWER
ORIENTATION: MID
ORIENTATION: MID

## 2024-10-04 ASSESSMENT — PATIENT HEALTH QUESTIONNAIRE - PHQ9
SUM OF ALL RESPONSES TO PHQ QUESTIONS 1-9: 7
5. POOR APPETITE OR OVEREATING: NOT AT ALL
SUM OF ALL RESPONSES TO PHQ QUESTIONS 1-9: 8
SUM OF ALL RESPONSES TO PHQ QUESTIONS 1-9: 8
SUM OF ALL RESPONSES TO PHQ9 QUESTIONS 1 & 2: 4
SUM OF ALL RESPONSES TO PHQ QUESTIONS 1-9: 8
SUM OF ALL RESPONSES TO PHQ9 QUESTIONS 1 & 2: 4
7. TROUBLE CONCENTRATING ON THINGS, SUCH AS READING THE NEWSPAPER OR WATCHING TELEVISION: NOT AT ALL
2. FEELING DOWN, DEPRESSED OR HOPELESS: MORE THAN HALF THE DAYS
2. FEELING DOWN, DEPRESSED OR HOPELESS: MORE THAN HALF THE DAYS
SUM OF ALL RESPONSES TO PHQ QUESTIONS 1-9: 8
4. FEELING TIRED OR HAVING LITTLE ENERGY: SEVERAL DAYS
3. TROUBLE FALLING OR STAYING ASLEEP: SEVERAL DAYS
10. IF YOU CHECKED OFF ANY PROBLEMS, HOW DIFFICULT HAVE THESE PROBLEMS MADE IT FOR YOU TO DO YOUR WORK, TAKE CARE OF THINGS AT HOME, OR GET ALONG WITH OTHER PEOPLE: SOMEWHAT DIFFICULT
8. MOVING OR SPEAKING SO SLOWLY THAT OTHER PEOPLE COULD HAVE NOTICED. OR THE OPPOSITE, BEING SO FIGETY OR RESTLESS THAT YOU HAVE BEEN MOVING AROUND A LOT MORE THAN USUAL: SEVERAL DAYS
8. MOVING OR SPEAKING SO SLOWLY THAT OTHER PEOPLE COULD HAVE NOTICED. OR THE OPPOSITE, BEING SO FIGETY OR RESTLESS THAT YOU HAVE BEEN MOVING AROUND A LOT MORE THAN USUAL: SEVERAL DAYS
1. LITTLE INTEREST OR PLEASURE IN DOING THINGS: MORE THAN HALF THE DAYS
9. THOUGHTS THAT YOU WOULD BE BETTER OFF DEAD, OR OF HURTING YOURSELF: SEVERAL DAYS
9. THOUGHTS THAT YOU WOULD BE BETTER OFF DEAD, OR OF HURTING YOURSELF: SEVERAL DAYS
SUM OF ALL RESPONSES TO PHQ QUESTIONS 1-9: 8
7. TROUBLE CONCENTRATING ON THINGS, SUCH AS READING THE NEWSPAPER OR WATCHING TELEVISION: NOT AT ALL
5. POOR APPETITE OR OVEREATING: NOT AT ALL
1. LITTLE INTEREST OR PLEASURE IN DOING THINGS: MORE THAN HALF THE DAYS
SUM OF ALL RESPONSES TO PHQ QUESTIONS 1-9: 8
6. FEELING BAD ABOUT YOURSELF - OR THAT YOU ARE A FAILURE OR HAVE LET YOURSELF OR YOUR FAMILY DOWN: NOT AT ALL
4. FEELING TIRED OR HAVING LITTLE ENERGY: SEVERAL DAYS
3. TROUBLE FALLING OR STAYING ASLEEP: SEVERAL DAYS
SUM OF ALL RESPONSES TO PHQ QUESTIONS 1-9: 7
10. IF YOU CHECKED OFF ANY PROBLEMS, HOW DIFFICULT HAVE THESE PROBLEMS MADE IT FOR YOU TO DO YOUR WORK, TAKE CARE OF THINGS AT HOME, OR GET ALONG WITH OTHER PEOPLE: SOMEWHAT DIFFICULT

## 2024-10-04 ASSESSMENT — PAIN DESCRIPTION - ONSET
ONSET: ON-GOING
ONSET: ON-GOING
ONSET: AWAKENED FROM SLEEP
ONSET: ON-GOING

## 2024-10-04 ASSESSMENT — PAIN DESCRIPTION - LOCATION
LOCATION: BACK
LOCATION: FOOT
LOCATION: BACK
LOCATION: BACK
LOCATION: LEG
LOCATION: BACK
LOCATION: BACK

## 2024-10-04 ASSESSMENT — PAIN DESCRIPTION - PAIN TYPE
TYPE: CHRONIC PAIN

## 2024-10-04 ASSESSMENT — PAIN DESCRIPTION - FREQUENCY
FREQUENCY: CONTINUOUS

## 2024-10-04 ASSESSMENT — SLEEP AND FATIGUE QUESTIONNAIRES
DO YOU HAVE DIFFICULTY SLEEPING: YES
DO YOU USE A SLEEP AID: NO
DO YOU HAVE DIFFICULTY SLEEPING: NO
SLEEP PATTERN: DISTURBED/INTERRUPTED SLEEP
AVERAGE NUMBER OF SLEEP HOURS: 4
DO YOU USE A SLEEP AID: NO

## 2024-10-04 ASSESSMENT — PAIN SCALES - GENERAL
PAINLEVEL_OUTOF10: 10
PAINLEVEL_OUTOF10: 0
PAINLEVEL_OUTOF10: 7
PAINLEVEL_OUTOF10: 3
PAINLEVEL_OUTOF10: 10
PAINLEVEL_OUTOF10: 6
PAINLEVEL_OUTOF10: 7

## 2024-10-04 ASSESSMENT — LIFESTYLE VARIABLES
HOW MANY STANDARD DRINKS CONTAINING ALCOHOL DO YOU HAVE ON A TYPICAL DAY: PATIENT DOES NOT DRINK
HOW OFTEN DO YOU HAVE A DRINK CONTAINING ALCOHOL: NEVER

## 2024-10-04 NOTE — H&P
his amputation, and that he cannot return there.    Past psychiatric medications includes:     Suboxone, trazodone, ramelteon, oxycodone, nicotine, Flexeril  Above medications noted from chart review, patient does not know his past psychiatric medicines beyond his pain medications.    Adverse reactions from psychotropic medications: no    Lifetime Psychiatric Review of Systems         Shanell or Hypomania: denies      Panic Attacks: denies      Phobias: denies     Obsessions and Compulsions:denies     Body or Vocal Tics:  denies     Hallucinations: denies     Delusions: Denies paranoid/grandiose/erotomania/persecutory/bizarre/non bizarre/mood congruent/ mood incongruent    Past Medical History:        Diagnosis Date    Hypertension        Labs:   Admission on 10/03/2024   Component Date Value Ref Range Status    Acetaminophen Level 10/04/2024 < 5.0  0.0 - 20.0 ug/mL Final    Performed at [facility] [address] [place] [id number]    Sodium 10/04/2024 135  135 - 145 meq/L Final    Potassium 10/04/2024 4.2  3.5 - 5.2 meq/L Final    Comment: Low level specimen hemolysis is present as indicated by the interference level  index on the Roche analyzer.  The reported K+ level may be falsely increased.  If clinically warranted, recollection of the specimen is suggested.      Chloride 10/04/2024 101  98 - 111 meq/L Final    CO2 10/04/2024 20 (L)  23 - 33 meq/L Final    Glucose 10/04/2024 89  70 - 108 mg/dL Final    BUN 10/04/2024 22  7 - 22 mg/dL Final    Creatinine 10/04/2024 0.6  0.4 - 1.2 mg/dL Final    Calcium 10/04/2024 9.2  8.5 - 10.5 mg/dL Final    Performed at [facility] [address] [place] [id number]    WBC 10/04/2024 5.0  4.8 - 10.8 thou/mm3 Final    RBC 10/04/2024 4.38 (L)  4.70 - 6.10 mill/mm3 Final    Hemoglobin 10/04/2024 13.9 (L)  14.0 - 18.0 gm/dl Final    Hematocrit 10/04/2024 42.1  42.0 - 52.0 % Final    MCV 10/04/2024 96.1 (H)  80.0 - 94.0 fL Final    MCH 10/04/2024 31.7  26.0 - 33.0  linear  Thought content: active suicidal ideations without current plan or intent               denies homicidal ideations               Denies hallucinations              denies delusions  Cognition:  Oriented to self, location, time, situation  Concentration clinically adequate  Memory: intact  Insight &Judgment: poor    DSM-5 Diagnosis    Major depressive disorder recurrent severe without psychosis      Psychosocial and Contextual factors:  Financial  Occupational  Relationship  Legal   Living situation  Educational     Past Medical History:   Diagnosis Date    Hypertension      TREATMENT PLAN    Risk Management:  close watch per standard protocol      Psychotherapy:  participation in milieu and group and individual sessions with Attending Physician,  and Physician Assistant/CNP      Estimated length of stay:  It might take more than 2 midnights to stabilize patient's mood/thoughts and titrate medications to effect.       GENERAL PATIENT/FAMILY EDUCATION  Patient will understand basic signs and symptoms, Patient will understand benefits/risks and potential side effects from proposed meds and Patient will understand their role in recovery.  Family is  active in patient's care.   Patient assets that may be helpful during treatment include: Intent to participate and engage in treatment, sufficient fund of knowledge and intellect to understand and utilize treatments.    Risk level: High     Goals:    Reviewed labs  Reviewed EKG  Will obtain records and review them today.  Medication adjustment: cymbalta  Consults: None   Risks, side effects, benefits, drug-drug interactions were discussed with the patient and patient agreed to the plan      Behavioral Services  Medicare Certification     Admission Day 1  I certify that this patient's inpatient psychiatric hospital admission is medically necessary for:    x (1) treatment which could reasonably be expected to improve this patient's condition, or    x (2)

## 2024-10-04 NOTE — PROGRESS NOTES
Pt states that the flexeril causes itching. This is a known side effect for the patient. Atarax given. Pt states that he will let staff know if itching continues.

## 2024-10-04 NOTE — PATIENT CARE CONFERENCE
Behavioral Health  Initial Interdisciplinary Treatment Plan NOTE    REVIEW DATE AND TIME: 0207P 10/4/2024    PATIENT was IN TREATMENT TEAM.  See Multidisciplinary Treatment Team sheet for participants.    ADMISSION TYPE:   Admission Type: Involuntary    REASON FOR ADMISSION:  Reason for Admission: \"I'm very depressed and I want to take myself out on my own terms\"      Estimated Length of Stay Update:  3-5 days  Estimated Discharge Date Update: 10/9/2024    Patient Strengths/Barriers  Strengths (Must Choose Two): Previous positive response to treatment, Support from family  Barriers: Support of organized community  Addictive Behavior:Addictive Behavior  In the Past 3 Months, Have You Felt or Has Someone Told You That You Have a Problem With  : None  Medical Problems:  Past Medical History:   Diagnosis Date    Hypertension        EDUCATION:   Learner Progress Toward Treatment Goals: Reviewed goals and plan of care    Method: Individual    Outcome: Verbalized understanding    PATIENT GOALS: Patient did not have a goal today.    OQ PRIORITIES IDENTIFIED BY THE PATIENT ON ADMISSION: (These are the symptoms that the patient has identified that are impacting them the most at the time of admission based on their own input on the OQ.  These priorities are to be included in all of their care while admitted.)        Patient refused to complete.    PLAN/TREATMENT RECOMMENDATIONS UPDATE:   What is the most important thing we can help you with while you are here? \"Just get me out.\"  Who is your support system? \"I've got kids, three of them.\"  Do you have follow-up providers?  Patient is connected with [facility] but stated he would like counseling.  Patient struggles to find services as he is wheelchair bound without transportation.  Do you have the ability to pay for your medications?  Patient is insured.  Where will you be residing when you leave the hospital? Patient has housing but states the housing is adding to his

## 2024-10-04 NOTE — PLAN OF CARE
Problem: Pain  Goal: Verbalizes/displays adequate comfort level or baseline comfort level  Outcome: Not Progressing  Note: Pt asking for pain medication, waiting to speak to physician     Problem: Risk for Elopement  Goal: Patient will not exit the unit/facility without proper excort  Outcome: Progressing  Flowsheets  Taken 10/4/2024 1008 by Bela Owens, RN  Nursing Interventions for Elopement Risk:   Reduce environmental triggers   Make sure patient has all necessary personal care items  Taken 10/4/2024 0629 by Melissa Garvey, RN  Nursing Interventions for Elopement Risk:   Reduce environmental triggers   Make sure patient has all necessary personal care items  Taken 10/4/2024 0616 by Melissa Garvey, RN  Nursing Interventions for Elopement Risk: Reduce environmental triggers  Note: Pt remains on EMC     Problem: Safety - Adult  Goal: Free from fall injury  Outcome: Progressing     Problem: Skin/Tissue Integrity  Goal: Absence of new skin breakdown  Description: 1.  Monitor for areas of redness and/or skin breakdown  2.  Assess vascular access sites hourly  3.  Every 4-6 hours minimum:  Change oxygen saturation probe site  4.  Every 4-6 hours:  If on nasal continuous positive airway pressure, respiratory therapy assess nares and determine need for appliance change or resting period.  Outcome: Not Progressing  Note: Consults include wound ostomy and podiatry to address     Problem: Anxiety  Goal: Will report anxiety at manageable levels  Description: INTERVENTIONS:  1. Administer medication as ordered  2. Teach and rehearse alternative coping skills  3. Provide emotional support with 1:1 interaction with staff  Outcome: Progressing  Flowsheets (Taken 10/4/2024 1008)  Will report anxiety at manageable levels:   Administer medication as ordered   Teach and rehearse alternative coping skills   Provide emotional support with 1:1 interaction with staff  Note: Pt remains irritable     Problem: Coping  Goal:  belongings  3. Encourage verbalization of thoughts and concerns in a socially appropriate manner  4. Utilize positive, consistent limit setting strategies supporting safety of patient, staff and others  5. Encourage participation in the decision making process about the behavioral management agreement  6. If a visitor's behavior poses a threat to safety call refer to organization policy.  7. Initiate consult with , Psychosocial CNS, Spiritual Care as appropriate  Outcome: Progressing     Problem: Involuntary Admit  Goal: Will cooperate with staff recommendations and doctor's orders and will demonstrate appropriate behavior  Description: INTERVENTIONS:  1. Treat underlying conditions and offer medication as ordered  2. Educate regarding involuntary admission procedures and rules  3. Contain excessive/inappropriate behavior per unit and hospital policies  Outcome: Progressing     Problem: Discharge Planning  Goal: Discharge to home or other facility with appropriate resources  Recent Flowsheet Documentation  Taken 10/4/2024 1008 by Bela Owens RN  Discharge to home or other facility with appropriate resources: Identify barriers to discharge with patient and caregiver     Problem: Musculoskeletal - Adult  Goal: Return mobility to safest level of function  Outcome: Not Progressing     Problem: Pain  Goal: Verbalizes/displays adequate comfort level or baseline comfort level  Outcome: Not Progressing  Note: Pt asking for pain medication, waiting to speak to physician     Problem: Skin/Tissue Integrity  Goal: Absence of new skin breakdown  Description: 1.  Monitor for areas of redness and/or skin breakdown  2.  Assess vascular access sites hourly  3.  Every 4-6 hours minimum:  Change oxygen saturation probe site  4.  Every 4-6 hours:  If on nasal continuous positive airway pressure, respiratory therapy assess nares and determine need for appliance change or resting period.  Outcome: Not Progressing  Note:
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
## 2024-10-04 NOTE — CONSULTS
[facility]  Podiatric Medicine and Surgery Consultation Note      Asim Back  Medical record number:  [id number]  Age: 54 y.o.   Gender: male  : 1970  Episode date:  10/3/2024    Subjective      History of present illness    Patient is a 54 y.o. male with a history of hypertension, paraplegia, previous amputation, history of major depressive disorder seen bedside today on behalf of Dr Ashley. Patient appeared pleasant, was oriented to person, place and time and in no acute distress at this current time. Patient presented to the ED this morning due to suicidal ideation and was admitted to the psychiatric floor for treatment. Patient states he has had the wounds calluses on his foot for many years. States he was being seen by Dr. Cervantes in the past and was the one responsible for his foot being amputated. States he used to see Dr. Ashley but hasn't seen him in over 6 months. States Dr. Ashley had been using silver alginate on his wound previously. States that he most recently has been having home health change his dressings at home and states they have been using medi honey. States his left leg feels very painful and describes it as a burning pain. Patient states his feet are frequently resting against something like a wheelchair and that's why he believes he has calluses. Patient denies any N/V/F/C/SOB or CP. No other pedal concerns.              Past medical history        Diagnosis Date    Hypertension        Past surgical history        Procedure Laterality Date    DEBRIDEMENT Left     Left foot       Family history    History reviewed. No pertinent family history.    Social history    Social History     Tobacco Use    Smoking status: Every Day     Current packs/day: 0.50     Types: Cigarettes    Smokeless tobacco: Never   Substance Use Topics    Alcohol use: Yes    Drug use: No       Allergies    Allergies   Allergen Reactions    Lisinopril     Penicillins        Medications    No

## 2024-10-04 NOTE — PROGRESS NOTES
[facility]   PROGRESS NOTE      Patient: Asim Back  Room #: 7E-02/002-A            YOB: 1970  Age: 54 y.o.  Gender: male            Admit Date & Time: 10/3/2024  7:46 PM    Assessment:    The patient declined a visit today.    Interventions:  The patient was provided information about Spiritual Care being available.     Outcomes:  The  wished the patient a positive day.     Plan:  1.Spiritual care will continue to follow the patient according to [facility] spiritual care SOP.       Electronically signed by Chaplain Terri, on 10/4/2024 at 4:08 PM.  Spiritual Care Department  [facility]  [phone]     10/04/24 1607   Encounter Summary   Encounter Overview/Reason Behavioral Health   Service Provided For Patient   Referral/Consult From Rounding   Support System Unknown   Last Encounter  10/04/24   Complexity of Encounter Low   Begin Time 1441   End Time  1445   Total Time Calculated 4 min   Spiritual/Emotional needs   Type Spiritual Support   Behavioral Health    Type  Initial Encounter   Assessment/Intervention/Outcome   Assessment Coping   Intervention Empowerment;Active listening   Outcome Refused/Declined;Receptive   Plan and Referrals   Plan/Referrals Continue to visit, (comment)
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
## 2024-10-04 NOTE — PROGRESS NOTES
Behavioral Health   Admission Note   Admission Type: Involuntary    Reason for Admission: \"I'm very depressed and I want to take myself out on my own terms\"    Patient Strengths/Barriers  Strengths (Must Choose Two): Previous positive response to treatment, Support from family  Barriers: Support of organized community    Addictive Behavior  In the Past 3 Months, Have You Felt or Has Someone Told You That You Have a Problem With  : None    Medical Problems:   Past Medical History:   Diagnosis Date    Hypertension        Status EXAM:  Mental Status and Behavioral Exam  Normal: No  Level of Assistance: Independent/Self  Facial Expression: Sad, Worried, Flat  Affect: Blunt  Level of Consciousness: Alert  Frequency of Checks: 1 staff: 1 patient  - continuous  Mood:Normal: No  Mood: Depressed, Anxious, Sad, Irritable  Motor Activity:Normal: No  Motor Activity: Decreased  Eye Contact: Poor  Observed Behavior: Cooperative  Sexual Misconduct History: Current - no  Preception: [place] to person, [place] to time, [place] to place, [place] to situation  Attention:Normal: Yes  Thought Processes: Circumstantial  Thought Content:Normal: Yes  Depression Symptoms: Feelings of hopelessess, Loss of interest, Increased irritability  Anxiety Symptoms: Generalized  [place] Symptoms: No problems reported or observed.  Hallucinations: None  Delusions: No  Memory:Normal: Yes  Insight and Judgment: No  Insight and Judgment: Poor insight    Pt admitted with followings belongings:  Dental Appliances: None  Vision - Corrective Lenses: None  Hearing Aid: None  Jewelry: None  Body Piercings Removed: No  Clothing: Shirt, Socks, Undergarments  Other Valuables: [place]     Admission order obtained Yes  Belongings sent home with, locked in patient locker.   Valuables placed in safe in security envelope, number:  NA. Patient's home medications were NA.    Patient oriented to surroundings and program expectations and copy of patient rights given.   Received

## 2024-10-04 NOTE — ED NOTES
This RN in to round. RR regular and unlabored. Call light in reach. Pt remains in ligature resistant room under constant observation.

## 2024-10-04 NOTE — PROGRESS NOTES
[facility] Contin[facility] Nurse  Progress Note       Asim Back  AGE: 54 y.o.   GENDER: male  : 1970  UNIT: 7E-02/002-A  TODAY'S DATE:  10/4/2024  ADMISSION DATE: 10/3/2024  7:46 PM    Summary:     Wound ostomy consulted for patient has a wound on his left foot which is amputated from mid foot. Podiatry consulted. Defer treatment plan to provider. Call as needed.

## 2024-10-05 PROCEDURE — 1240000000 HC EMOTIONAL WELLNESS R&B

## 2024-10-05 PROCEDURE — 6370000000 HC RX 637 (ALT 250 FOR IP): Performed by: PSYCHIATRY & NEUROLOGY

## 2024-10-05 PROCEDURE — 6370000000 HC RX 637 (ALT 250 FOR IP): Performed by: PHYSICIAN ASSISTANT

## 2024-10-05 PROCEDURE — 6370000000 HC RX 637 (ALT 250 FOR IP)

## 2024-10-05 PROCEDURE — 99232 SBSQ HOSP IP/OBS MODERATE 35: CPT | Performed by: PSYCHIATRY & NEUROLOGY

## 2024-10-05 RX ADMIN — ASPIRIN 81 MG 81 MG: 81 TABLET ORAL at 08:41

## 2024-10-05 RX ADMIN — DOCUSATE SODIUM 100 MG: 100 CAPSULE, LIQUID FILLED ORAL at 20:22

## 2024-10-05 RX ADMIN — GABAPENTIN 800 MG: 400 CAPSULE ORAL at 20:22

## 2024-10-05 RX ADMIN — GABAPENTIN 800 MG: 400 CAPSULE ORAL at 08:42

## 2024-10-05 RX ADMIN — FOLIC ACID 1 MG: 1 TABLET ORAL at 08:42

## 2024-10-05 RX ADMIN — DULOXETINE HYDROCHLORIDE 90 MG: 60 CAPSULE, DELAYED RELEASE ORAL at 08:42

## 2024-10-05 RX ADMIN — OXYCODONE HYDROCHLORIDE 30 MG: 15 TABLET ORAL at 08:40

## 2024-10-05 RX ADMIN — PANTOPRAZOLE SODIUM 40 MG: 40 TABLET, DELAYED RELEASE ORAL at 08:41

## 2024-10-05 RX ADMIN — DOCUSATE SODIUM 100 MG: 100 CAPSULE, LIQUID FILLED ORAL at 08:42

## 2024-10-05 RX ADMIN — GABAPENTIN 800 MG: 400 CAPSULE ORAL at 14:46

## 2024-10-05 RX ADMIN — ATORVASTATIN CALCIUM 10 MG: 10 TABLET, FILM COATED ORAL at 20:22

## 2024-10-05 RX ADMIN — AMLODIPINE BESYLATE 10 MG: 10 TABLET ORAL at 08:42

## 2024-10-05 RX ADMIN — OXYCODONE HYDROCHLORIDE 30 MG: 15 TABLET ORAL at 21:27

## 2024-10-05 RX ADMIN — Medication 1000 UNITS: at 08:42

## 2024-10-05 RX ADMIN — TAMSULOSIN HYDROCHLORIDE 0.4 MG: 0.4 CAPSULE ORAL at 08:42

## 2024-10-05 ASSESSMENT — PAIN SCALES - GENERAL
PAINLEVEL_OUTOF10: 6
PAINLEVEL_OUTOF10: 10
PAINLEVEL_OUTOF10: 2
PAINLEVEL_OUTOF10: 0
PAINLEVEL_OUTOF10: 6
PAINLEVEL_OUTOF10: 2

## 2024-10-05 ASSESSMENT — PAIN DESCRIPTION - ORIENTATION
ORIENTATION: LOWER
ORIENTATION: LOWER
ORIENTATION: ANTERIOR

## 2024-10-05 ASSESSMENT — PAIN DESCRIPTION - PAIN TYPE
TYPE: CHRONIC PAIN
TYPE: CHRONIC PAIN

## 2024-10-05 ASSESSMENT — PAIN DESCRIPTION - DESCRIPTORS
DESCRIPTORS: ACHING;DISCOMFORT
DESCRIPTORS: ACHING;SPASM;TENDER
DESCRIPTORS: ACHING;DISCOMFORT

## 2024-10-05 ASSESSMENT — PAIN DESCRIPTION - LOCATION
LOCATION: BACK

## 2024-10-05 ASSESSMENT — PAIN DESCRIPTION - ONSET
ONSET: ON-GOING
ONSET: ON-GOING

## 2024-10-05 ASSESSMENT — PAIN DESCRIPTION - FREQUENCY
FREQUENCY: CONTINUOUS
FREQUENCY: CONTINUOUS

## 2024-10-05 ASSESSMENT — PAIN SCALES - WONG BAKER: WONGBAKER_NUMERICALRESPONSE: NO HURT

## 2024-10-05 ASSESSMENT — PAIN - FUNCTIONAL ASSESSMENT
PAIN_FUNCTIONAL_ASSESSMENT: PREVENTS OR INTERFERES WITH MANY ACTIVE NOT PASSIVE ACTIVITIES
PAIN_FUNCTIONAL_ASSESSMENT: PREVENTS OR INTERFERES SOME ACTIVE ACTIVITIES AND ADLS

## 2024-10-05 NOTE — BH NOTE
Pt declined to attend 0900 Community Meeting and Goal group therapy session offered today. Pt was provided maximum verbal encouragement to attend group therapy session. Pt remained resting in bed. No daily goal was obtained.

## 2024-10-05 NOTE — PLAN OF CARE
Problem: Coping  Goal: Pt/Family able to verbalize concerns and demonstrate effective coping strategies  Description: INTERVENTIONS:  1. Assist patient/family to identify coping skills, available support systems and cultural and spiritual values  2. Provide emotional support, including active listening and acknowledgement of concerns of patient and caregivers  3. Reduce environmental stimuli, as able  4. Instruct patient/family in relaxation techniques, as appropriate  5. Assess for spiritual pain/suffering and initiate Spiritual Care, Psychosocial Clinical Specialist consults as needed  Recent Flowsheet Documentation  Taken 10/5/2024 0800 by Hazel Shetty LPN  Patient/family able to verbalize anxieties, fears, and concerns, and demonstrate effective coping: Provide emotional support, including active listening and acknowledgement of concerns of patient and caregivers     Problem: Change in Body Image  Goal: Pt/Family communicate acceptance of loss or change in body image and feel psychological comfort and peace  Description: INTERVENTIONS:  1. Assess patient/family anxiety and grief process related to change in body image, loss of functional status, loss of sense of self, and forgiveness  2. Provide emotional and spiritual support  3. Provide information about the patient's health status with consideration of family and cultural values  4. Communicate willingness to discuss loss and facilitate grief process with patient/family as appropriate  5. Emphasize sustaining relationships within family system and community, or dat/spiritual traditions  6. Initiate Spiritual Care, Psychosocial Clinical Specialist consult as needed  Outcome: Not Progressing  Flowsheets (Taken 10/5/2024 1526)  Patient/family communicate acceptance of loss or change in body image and feel psychological comfort and peace:   Provide emotional and spiritual support   Assess patient/family anxiety and grief process related to change in body  image, loss of functional status, loss of sense of self, and forgiveness  Note: Pt has attended 0/3 group therapy sessions offered thus far today. Pt has been given maximum verbal encouragement to attend group therapy sessions, pt has been isolative to his room the majority of the day. Intermittently, pt is seen out on the unit interacting with others. Plan to continue to monitor progress and encourage participation in group therapy programming.

## 2024-10-05 NOTE — PLAN OF CARE
Problem: Pain  Goal: Verbalizes/displays adequate comfort level or baseline comfort level  Outcome: Progressing  Note: Medication given for pain, see MAR     Problem: Risk for Elopement  Goal: Patient will not exit the unit/facility without proper excort  Outcome: Progressing  Flowsheets (Taken 10/5/2024 0800)  Nursing Interventions for Elopement Risk: Reduce environmental triggers  Note: Patient has not attempt to exit the facility, will monitor     Problem: Safety - Adult  Goal: Free from fall injury  Outcome: Progressing  Note: Patient remained free from falls     Problem: Skin/Tissue Integrity  Goal: Absence of new skin breakdown  Description: 1.  Monitor for areas of redness and/or skin breakdown  2.  Assess vascular access sites hourly  3.  Every 4-6 hours minimum:  Change oxygen saturation probe site  4.  Every 4-6 hours:  If on nasal continuous positive airway pressure, respiratory therapy assess nares and determine need for appliance change or resting period.  Outcome: Progressing  Note: No new skin breakdown, will continue to monitor     Problem: Anxiety  Goal: Will report anxiety at manageable levels  Description: INTERVENTIONS:  1. Administer medication as ordered  2. Teach and rehearse alternative coping skills  3. Provide emotional support with 1:1 interaction with staff  Outcome: Progressing  Flowsheets (Taken 10/5/2024 0800)  Will report anxiety at manageable levels: Teach and rehearse alternative coping skills  Note: Patient denies anxiety at this  time, will continue to monitor     Problem: Coping  Goal: Pt/Family able to verbalize concerns and demonstrate effective coping strategies  Description: INTERVENTIONS:  1. Assist patient/family to identify coping skills, available support systems and cultural and spiritual values  2. Provide emotional support, including active listening and acknowledgement of concerns of patient and caregivers  3. Reduce environmental stimuli, as able  4. Instruct  sustaining relationships within family system and community, or dat/spiritual traditions  6. Initiate Spiritual Care, Psychosocial Clinical Specialist consult as needed  10/5/2024 1526 by Nicole Castaneda OT  Outcome: Not Progressing  Flowsheets (Taken 10/5/2024 1526)  Patient/family communicate acceptance of loss or change in body image and feel psychological comfort and peace:   Provide emotional and spiritual support   Assess patient/family anxiety and grief process related to change in body image, loss of functional status, loss of sense of self, and forgiveness  Note: Pt has attended 0/3 group therapy sessions offered thus far today. Pt has been given maximum verbal encouragement to attend group therapy sessions, pt has been isolative to his room the majority of the day. Intermittently, pt is seen out on the unit interacting with others. Plan to continue to monitor progress and encourage participation in group therapy programming.    10/5/2024 1516 by Hazel Shetty LPN  Outcome: Not Progressing  Note: Patient did not discuss this shift     Problem: Discharge Planning  Goal: Discharge to home or other facility with appropriate resources  Outcome: Not Progressing  Note: Discharge planning is yet to be determined     Problem: Musculoskeletal - Adult  Goal: Return mobility to safest level of function  Outcome: Not Progressing  Flowsheets (Taken 10/5/2024 0800)  Return Mobility to Safest Level of Function:   Assist with transfers and ambulation using safe patient handling equipment as needed   Ensure adequate protection for wounds/incisions during mobilization  Note: Patient has been in bed all day at this time, will monitor     Discussed tx plan with patient and encouragement is given to attends groups  Compliant with medications

## 2024-10-05 NOTE — PROGRESS NOTES
Behavioral Services  Medicare Certification Upon Admission    I certify that this patient's inpatient psychiatric hospital admission is medically necessary for:    [x] (1) Treatment which could reasonably be expected to improve this patient's condition,       [x] (2) Or for diagnostic study;     AND     [x](2) The inpatient psychiatric services are provided while the individual is under the care of a physician and are included in the individualized plan of care.    Estimated length of stay/service 3-5 days    Plan for post-hospital care hc    Electronically signed by Nacho Rothman MD on 10/4/2024 at 8:03 PM

## 2024-10-05 NOTE — PROGRESS NOTES
Daily Progress Note  Nacho Rothman MD  10/5/2024  CHIEF COMPLAINT:  Suicidal ideation    Reviewed patient's current plan of care and vital signs with nursing staff.  Sleep:  8 hours last night  Attending groups: No    SUBJECTIVE:    Patient continues to be tearful stating that he feels extremely scared about returning back to the same assisted living facility.  Reports feeling helpless hopeless and worthless.  Reports trouble falling asleep and staying asleep.  Mentions that ongoing pain issues and anticipatory anxiety about where he is going to go from here is bothering him.  Reports suicidal thoughts are largely unchanged.  Mentions that he has plans to call his family members admission area to work on a safe discharge plan.  Reports that he is tolerating medications well and denies any side effects.  He does not want to take home dose of Risperdal.  Could not elicit any specific psychotic symptoms today.    Mental Status Exam  Level of consciousness:  Within normal limits  Appearance: Hospital attire, seated in chair, with good grooming and hygiene   Behavior/Motor: No abnormalities noted  Attitude toward examiner:  Cooperative, attentive, good eye contact  Speech:  spontaneous, normal rate, normal volume and well articulated  Mood: Depressed  Affect: Flat  Thought processes:  linear, goal directed and coherent  Thought content:  denies homicidal ideation  Suicidal Ideation: Passive suicidal ideation  Delusions:  no evidence of delusions  Perceptual Disturbance:  denies any perceptual disturbance  Cognition:  Oriented to self, location, time, and situation  Memory: age appropriate  Insight & Judgement: improving  Medication side effects:  denies         Data   height is 1.956 m (6' 5\") and weight is 86.2 kg (190 lb). His oral temperature is 97.3 °F (36.3 °C). His blood pressure is 121/82 and his pulse is 99. His respiration is 16 and oxygen saturation is 95%.   Labs:   Admission on 10/03/2024  ingestion, or following  therapy that affects renal tubular secretion.  Performed at [facility] [address] [place] [address]              Medications  Current Facility-Administered Medications: acetaminophen (TYLENOL) tablet 650 mg, 650 mg, Oral, Q4H PRN  ibuprofen (ADVIL;MOTRIN) tablet 400 mg, 400 mg, Oral, Q6H PRN  magnesium hydroxide (MILK OF MAGNESIA) 400 MG/5ML suspension 30 mL, 30 mL, Oral, Daily PRN  aluminum & magnesium hydroxide-simethicone (MAALOX) 200-200-20 MG/5ML suspension 30 mL, 30 mL, Oral, Q6H PRN  hydrOXYzine HCl (ATARAX) tablet 50 mg, 50 mg, Oral, TID PRN  traZODone (DESYREL) tablet 50 mg, 50 mg, Oral, Nightly PRN  nicotine (NICODERM CQ) 14 MG/24HR 1 patch, 1 patch, TransDERmal, Daily  gabapentin (NEURONTIN) capsule 800 mg, 800 mg, Oral, TID  oxyCODONE (OXY-IR) immediate release tablet 30 mg, 30 mg, Oral, Q8H PRN  cyclobenzaprine (FLEXERIL) tablet 10 mg, 10 mg, Oral, TID PRN  DULoxetine (CYMBALTA) extended release capsule 90 mg, 90 mg, Oral, Daily  amLODIPine (NORVASC) tablet 10 mg, 10 mg, Oral, Daily  aspirin chewable tablet 81 mg, 81 mg, Oral, Daily  atorvastatin (LIPITOR) tablet 10 mg, 10 mg, Oral, Nightly  docusate sodium (COLACE) capsule 100 mg, 100 mg, Oral, BID  folic acid (FOLVITE) tablet 1 mg, 1 mg, Oral, Daily  pantoprazole (PROTONIX) tablet 40 mg, 40 mg, Oral, QAM AC  tamsulosin (FLOMAX) capsule 0.4 mg, 0.4 mg, Oral, Daily  Vitamin D (CHOLECALCIFEROL) tablet 1,000 Units, 1 tablet, Oral, Daily    ASSESSMENT  MDD (major depressive disorder), recurrent severe, without psychosis (HCC)     PLAN  Patient s symptoms   show no change  We will consider adding low-dose of Abilify to augment Cymbalta  Attempt to develop insight  Psycho-education conducted.  Supportive Therapy conducted.  Probable discharge is to be decided  Follow-up tbd    Electronically signed by Nacho Rothman MD on 10/5/24 at 5:25 PM EDT    **This report has been created using voice recognition

## 2024-10-05 NOTE — PLAN OF CARE
Problem: Pain  Goal: Verbalizes/displays adequate comfort level or baseline comfort level  Outcome: Progressing  Flowsheets (Taken 10/4/2024 1945)  Verbalizes/displays adequate comfort level or baseline comfort level:   Assess pain using appropriate pain scale   Implement non-pharmacological measures as appropriate and evaluate response   Encourage patient to monitor pain and request assistance  Note: Chronic pain reported, refer to flow sheets.     Problem: Risk for Elopement  Goal: Patient will not exit the unit/facility without proper excort  Outcome: Progressing  Flowsheets (Taken 10/4/2024 1945)  Nursing Interventions for Elopement Risk:   Reduce environmental triggers   Make sure patient has all necessary personal care items  Note: No attempts at elopement noted so far this shift.     Problem: Safety - Adult  Goal: Free from fall injury  Outcome: Progressing  Flowsheets (Taken 10/5/2024 0040)  Free From Fall Injury: Instruct family/caregiver on patient safety  Note: No falls noted.      Problem: Skin/Tissue Integrity  Goal: Absence of new skin breakdown  Description: 1.  Monitor for areas of redness and/or skin breakdown  2.  Assess vascular access sites hourly  3.  Every 4-6 hours minimum:  Change oxygen saturation probe site  4.  Every 4-6 hours:  If on nasal continuous positive airway pressure, respiratory therapy assess nares and determine need for appliance change or resting period.  Outcome: Progressing  Note: No new breakdown reported, Unable to fully assess skin due to patient refusal. Patient only allowed sock to be removed from right foot.     Problem: Anxiety  Goal: Will report anxiety at manageable levels  Description: INTERVENTIONS:  1. Administer medication as ordered  2. Teach and rehearse alternative coping skills  3. Provide emotional support with 1:1 interaction with staff  Outcome: Progressing  Flowsheets (Taken 10/4/2024 1945)  Will report anxiety at manageable levels:   Teach and  rehearse alternative coping skills   Provide emotional support with 1:1 interaction with staff  Note: Patient denies anxiety.     Problem: Coping  Goal: Pt/Family able to verbalize concerns and demonstrate effective coping strategies  Description: INTERVENTIONS:  1. Assist patient/family to identify coping skills, available support systems and cultural and spiritual values  2. Provide emotional support, including active listening and acknowledgement of concerns of patient and caregivers  3. Reduce environmental stimuli, as able  4. Instruct patient/family in relaxation techniques, as appropriate  5. Assess for spiritual pain/suffering and initiate Spiritual Care, Psychosocial Clinical Specialist consults as needed  Outcome: Progressing  Flowsheets (Taken 10/4/2024 1945)  Patient/family able to verbalize anxieties, fears, and concerns, and demonstrate effective coping:   Provide emotional support, including active listening and acknowledgement of concerns of patient and caregivers   Reduce environmental stimuli, as able   Instruct patient/family in relaxation techniques, as appropriate     Problem: Change in Body Image  Goal: Pt/Family communicate acceptance of loss or change in body image and feel psychological comfort and peace  Description: INTERVENTIONS:  1. Assess patient/family anxiety and grief process related to change in body image, loss of functional status, loss of sense of self, and forgiveness  2. Provide emotional and spiritual support  3. Provide information about the patient's health status with consideration of family and cultural values  4. Communicate willingness to discuss loss and facilitate grief process with patient/family as appropriate  5. Emphasize sustaining relationships within family system and community, or dat/spiritual traditions  6. Initiate Spiritual Care, Psychosocial Clinical Specialist consult as needed  Outcome: Progressing  Flowsheets (Taken 10/4/2024 1945)  Patient/family

## 2024-10-05 NOTE — BH NOTE
INPATIENT RECREATIONAL THERAPY  ADULT BEHAVIORAL SERVICES  EVALUATION  pt was provided maximum verbal encouragement to complete the TR assessment. Pt remained resting in bed. Will complete when appropriate   REFERRING PHYSICIAN:  Nacho Rothman MD  DIAGNOSIS: MDD, recurrent severe, without psychosis     PRECAUTIONS: Standard precautions   HISTORY OF PRESENT ILLNESS/INJURY:   a 54 y.o. male with significant past psychiatric history of major depressive disorder, recurrent, severe without psychosis Who presented to the ED under [facility] status written by [facility] due to suicidal ideation.   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  YOB: 1970  GENDER:  male  MARITAL STATUS: single  EMPLOYMENT STATUS: Unemployed  LIVING SITUATION/SUPPORT:  Currently lives in [facility], but wants to return home to [place]   EDUCATIONAL LEVEL: did not complete HS  MEDICATION/DRUG USE:  No hx of drug use   LEISURE INTERESTS:  FABI   ACTIVITY PREFERENCE: FABI  ACTIVITY TYPES:  FABI  COGNITION: FABI    COPING: FABI  ATTENTION: FABI  RELAXATION: FABI  SELF-ESTEEM:FABI  MOTIVATION:  FABI    SOCIAL SKILLS:  FABI  FRUSTRATION TOLERANCE:  FABI  ATTENTION SEEKING: FABI  COOPERATION: FABI  AFFECT: FABI  APPEARANCE: FABI    HEARING:  FABI  VISION:   FABI  VERBAL COMMUNICATION:  FABI  WRITTEN COMMUNICATION:  FABI    COORDINATION: FBAI   MOBILITY:  FABI   GOALS:FABI

## 2024-10-05 NOTE — PROGRESS NOTES
This RN has reviewed and agrees with Hazel Shetty LPN's data collection and has collaborated with this LPN regarding the patient's care plan.

## 2024-10-06 VITALS
HEART RATE: 95 BPM | TEMPERATURE: 97.7 F | RESPIRATION RATE: 16 BRPM | HEIGHT: 77 IN | BODY MASS INDEX: 22.43 KG/M2 | SYSTOLIC BLOOD PRESSURE: 134 MMHG | WEIGHT: 190 LBS | DIASTOLIC BLOOD PRESSURE: 90 MMHG | OXYGEN SATURATION: 94 %

## 2024-10-06 PROCEDURE — 99232 SBSQ HOSP IP/OBS MODERATE 35: CPT | Performed by: PSYCHIATRY & NEUROLOGY

## 2024-10-06 PROCEDURE — 6370000000 HC RX 637 (ALT 250 FOR IP): Performed by: PSYCHIATRY & NEUROLOGY

## 2024-10-06 PROCEDURE — 6370000000 HC RX 637 (ALT 250 FOR IP): Performed by: PHYSICIAN ASSISTANT

## 2024-10-06 PROCEDURE — 1240000000 HC EMOTIONAL WELLNESS R&B

## 2024-10-06 PROCEDURE — 6370000000 HC RX 637 (ALT 250 FOR IP)

## 2024-10-06 RX ORDER — DULOXETIN HYDROCHLORIDE 30 MG/1
30 CAPSULE, DELAYED RELEASE ORAL 3 TIMES DAILY
Status: DISCONTINUED | OUTPATIENT
Start: 2024-10-07 | End: 2024-10-14 | Stop reason: HOSPADM

## 2024-10-06 RX ADMIN — FOLIC ACID 1 MG: 1 TABLET ORAL at 07:37

## 2024-10-06 RX ADMIN — OXYCODONE HYDROCHLORIDE 30 MG: 15 TABLET ORAL at 18:07

## 2024-10-06 RX ADMIN — DOCUSATE SODIUM 100 MG: 100 CAPSULE, LIQUID FILLED ORAL at 20:46

## 2024-10-06 RX ADMIN — OXYCODONE HYDROCHLORIDE 30 MG: 15 TABLET ORAL at 07:37

## 2024-10-06 RX ADMIN — TAMSULOSIN HYDROCHLORIDE 0.4 MG: 0.4 CAPSULE ORAL at 07:36

## 2024-10-06 RX ADMIN — GABAPENTIN 800 MG: 400 CAPSULE ORAL at 20:46

## 2024-10-06 RX ADMIN — DOCUSATE SODIUM 100 MG: 100 CAPSULE, LIQUID FILLED ORAL at 07:37

## 2024-10-06 RX ADMIN — GABAPENTIN 800 MG: 400 CAPSULE ORAL at 07:36

## 2024-10-06 RX ADMIN — DULOXETINE HYDROCHLORIDE 90 MG: 60 CAPSULE, DELAYED RELEASE ORAL at 07:37

## 2024-10-06 RX ADMIN — Medication 1000 UNITS: at 07:37

## 2024-10-06 RX ADMIN — GABAPENTIN 800 MG: 400 CAPSULE ORAL at 13:32

## 2024-10-06 RX ADMIN — PANTOPRAZOLE SODIUM 40 MG: 40 TABLET, DELAYED RELEASE ORAL at 07:39

## 2024-10-06 RX ADMIN — CYCLOBENZAPRINE 10 MG: 10 TABLET, FILM COATED ORAL at 20:45

## 2024-10-06 RX ADMIN — ASPIRIN 81 MG 81 MG: 81 TABLET ORAL at 07:37

## 2024-10-06 RX ADMIN — AMLODIPINE BESYLATE 10 MG: 10 TABLET ORAL at 07:37

## 2024-10-06 RX ADMIN — ATORVASTATIN CALCIUM 10 MG: 10 TABLET, FILM COATED ORAL at 20:46

## 2024-10-06 ASSESSMENT — PAIN SCALES - GENERAL
PAINLEVEL_OUTOF10: 2
PAINLEVEL_OUTOF10: 0
PAINLEVEL_OUTOF10: 3
PAINLEVEL_OUTOF10: 7
PAINLEVEL_OUTOF10: 1
PAINLEVEL_OUTOF10: 7
PAINLEVEL_OUTOF10: 4
PAINLEVEL_OUTOF10: 0
PAINLEVEL_OUTOF10: 8

## 2024-10-06 ASSESSMENT — PAIN DESCRIPTION - LOCATION
LOCATION: BACK
LOCATION: FOOT
LOCATION: BACK
LOCATION: BACK

## 2024-10-06 ASSESSMENT — PAIN SCALES - WONG BAKER
WONGBAKER_NUMERICALRESPONSE: NO HURT
WONGBAKER_NUMERICALRESPONSE: HURTS A LITTLE BIT
WONGBAKER_NUMERICALRESPONSE: HURTS A LITTLE BIT

## 2024-10-06 ASSESSMENT — PAIN - FUNCTIONAL ASSESSMENT
PAIN_FUNCTIONAL_ASSESSMENT: PREVENTS OR INTERFERES SOME ACTIVE ACTIVITIES AND ADLS
PAIN_FUNCTIONAL_ASSESSMENT: PREVENTS OR INTERFERES SOME ACTIVE ACTIVITIES AND ADLS

## 2024-10-06 ASSESSMENT — PAIN DESCRIPTION - ORIENTATION
ORIENTATION: LOWER
ORIENTATION: LOWER
ORIENTATION: RIGHT;LEFT
ORIENTATION: LOWER

## 2024-10-06 ASSESSMENT — PAIN DESCRIPTION - PAIN TYPE: TYPE: CHRONIC PAIN

## 2024-10-06 ASSESSMENT — PAIN DESCRIPTION - DESCRIPTORS
DESCRIPTORS: ACHING;NAGGING;THROBBING
DESCRIPTORS: ACHING;DISCOMFORT
DESCRIPTORS: ACHING;DISCOMFORT;NAGGING
DESCRIPTORS: NUMBNESS;THROBBING

## 2024-10-06 ASSESSMENT — PAIN DESCRIPTION - FREQUENCY: FREQUENCY: CONTINUOUS

## 2024-10-06 ASSESSMENT — PAIN DESCRIPTION - ONSET: ONSET: ON-GOING

## 2024-10-06 NOTE — PLAN OF CARE
relationships within family system and community, or dat/spiritual traditions  6. Initiate Spiritual Care, Psychosocial Clinical Specialist consult as needed  10/6/2024 1322 by Hazel Shetty LPN  Outcome: Progressing  Note: Patient did not discuss this shift  10/6/2024 0054 by Ovi Vital, OTONIEL  Outcome: Progressing  Flowsheets  Taken 10/6/2024 0038 by Ovi Vital, RN  Patient/family communicate acceptance of loss or change in body image and feel psychological comfort and peace: Provide information about the patient’s health status with consideration of family and cultural values  Taken 10/5/2024 1526 by Nicole Castaneda OT  Patient/family communicate acceptance of loss or change in body image and feel psychological comfort and peace:   Provide emotional and spiritual support   Assess patient/family anxiety and grief process related to change in body image, loss of functional status, loss of sense of self, and forgiveness  Note: Accepted body image already     Problem: Behavior  Goal: Pt/Family maintain appropriate behavior and adhere to behavioral management agreement, if implemented  Description: INTERVENTIONS:  1. Assess patient/family's coping skills and  non-compliant behavior (including use of illegal substances)  2. Notify security of behavior or suspected illegal substances which indicate the need for search of the family and/or belongings  3. Encourage verbalization of thoughts and concerns in a socially appropriate manner  4. Utilize positive, consistent limit setting strategies supporting safety of patient, staff and others  5. Encourage participation in the decision making process about the behavioral management agreement  6. If a visitor's behavior poses a threat to safety call refer to organization policy.  7. Initiate consult with , Psychosocial CNS, Spiritual Care as appropriate  10/6/2024 1322 by Hazel Shetty LPN  Outcome: Progressing  Note: Appropriate  function  10/6/2024 0054 by Ovi Vital RN  Outcome: Progressing  Flowsheets (Taken 10/6/2024 0038)  Return Mobility to Safest Level of Function: Assist with transfers and ambulation using safe patient handling equipment as needed  Note: Assist with ADL and tranfers      Problem: Pain  Goal: Verbalizes/displays adequate comfort level or baseline comfort level  10/6/2024 1322 by Hazel Shetty LPN  Outcome: Not Progressing  Note: Medication given for pain, see MAR  10/6/2024 0054 by Ovi Vital RN  Outcome: Progressing  Note: Denies pain at the moment      Problem: Anxiety  Goal: Will report anxiety at manageable levels  Description: INTERVENTIONS:  1. Administer medication as ordered  2. Teach and rehearse alternative coping skills  3. Provide emotional support with 1:1 interaction with staff  10/6/2024 1322 by Hazel Shetty LPN  Outcome: Not Progressing  Flowsheets (Taken 10/6/2024 0800)  Will report anxiety at manageable levels:   Administer medication as ordered   Teach and rehearse alternative coping skills  Note: Medication given for anxiety, see MAR  10/6/2024 0054 by Ovi Vital RN  Outcome: Progressing  Flowsheets (Taken 10/6/2024 0038)  Will report anxiety at manageable levels: Teach and rehearse alternative coping skills  Note: Denies     Problem: Coping  Goal: Pt/Family able to verbalize concerns and demonstrate effective coping strategies  Description: INTERVENTIONS:  1. Assist patient/family to identify coping skills, available support systems and cultural and spiritual values  2. Provide emotional support, including active listening and acknowledgement of concerns of patient and caregivers  3. Reduce environmental stimuli, as able  4. Instruct patient/family in relaxation techniques, as appropriate  5. Assess for spiritual pain/suffering and initiate Spiritual Care, Psychosocial Clinical Specialist consults as needed  10/6/2024 1322 by Hazel Shetty

## 2024-10-06 NOTE — PROGRESS NOTES
creatine ingestion, or following  therapy that affects renal tubular secretion.  Performed at [facility] [address] [place] [address]              Medications  Current Facility-Administered Medications: acetaminophen (TYLENOL) tablet 650 mg, 650 mg, Oral, Q4H PRN  ibuprofen (ADVIL;MOTRIN) tablet 400 mg, 400 mg, Oral, Q6H PRN  magnesium hydroxide (MILK OF MAGNESIA) 400 MG/5ML suspension 30 mL, 30 mL, Oral, Daily PRN  aluminum & magnesium hydroxide-simethicone (MAALOX) 200-200-20 MG/5ML suspension 30 mL, 30 mL, Oral, Q6H PRN  hydrOXYzine HCl (ATARAX) tablet 50 mg, 50 mg, Oral, TID PRN  traZODone (DESYREL) tablet 50 mg, 50 mg, Oral, Nightly PRN  nicotine (NICODERM CQ) 14 MG/24HR 1 patch, 1 patch, TransDERmal, Daily  gabapentin (NEURONTIN) capsule 800 mg, 800 mg, Oral, TID  oxyCODONE (OXY-IR) immediate release tablet 30 mg, 30 mg, Oral, Q8H PRN  cyclobenzaprine (FLEXERIL) tablet 10 mg, 10 mg, Oral, TID PRN  DULoxetine (CYMBALTA) extended release capsule 90 mg, 90 mg, Oral, Daily  amLODIPine (NORVASC) tablet 10 mg, 10 mg, Oral, Daily  aspirin chewable tablet 81 mg, 81 mg, Oral, Daily  atorvastatin (LIPITOR) tablet 10 mg, 10 mg, Oral, Nightly  docusate sodium (COLACE) capsule 100 mg, 100 mg, Oral, BID  folic acid (FOLVITE) tablet 1 mg, 1 mg, Oral, Daily  pantoprazole (PROTONIX) tablet 40 mg, 40 mg, Oral, QAM AC  tamsulosin (FLOMAX) capsule 0.4 mg, 0.4 mg, Oral, Daily  Vitamin D (CHOLECALCIFEROL) tablet 1,000 Units, 1 tablet, Oral, Daily    ASSESSMENT  MDD (major depressive disorder), recurrent severe, without psychosis (HCC)     PLAN  Patient s symptoms show no change  Attempt to develop insight  Psycho-education conducted.  Supportive Therapy conducted.  Probable discharge is to be decided  Follow-up tbd    Electronically signed by Nacho Rothman MD on 10/6/2024 at 7:13 PM     **This report has been created using voice recognition software. It may contain minor errors which are inherent in voice

## 2024-10-06 NOTE — PATIENT CARE CONFERENCE
Behavioral Health   Day 3 Interdisciplinary Treatment Plan NOTE    Review Date & Time: 10/6/24 1343    Patient was in treatment team    Admission Type:   Admission Type: Involuntary    Reason for admission:  Reason for Admission: \"I'm very depressed and I want to take myself out on my own terms\"  Estimated Length of Stay Update:  2-3 days  Estimated Discharge Date Update: 10/10/24    Patient Strengths/Barriers  Strengths (Must Choose Two): Previous positive response to treatment, Support from family  Barriers: Support of organized community  Addictive Behavior:Addictive Behavior  In the Past 3 Months, Have You Felt or Has Someone Told You That You Have a Problem With  : None  Medical Problems:  Past Medical History:   Diagnosis Date    Hypertension        Risk:  Fall Risk   Rock Scale Rock Scale Score: 16    Status EXAM:   Mental Status and Behavioral Exam  Normal: No  Level of Assistance: Independent/Self  Facial Expression: Expressionless, Flat  Affect: Appropriate  Level of Consciousness: Alert  Frequency of Checks: 4 times per hour, close  Mood:Normal: Yes  Mood: Other (comment) (denies anxiety and depression)  Motor Activity:Normal: No  Motor Activity: Decreased  Eye Contact: Poor  Observed Behavior: Cooperative, Guarded  Sexual Misconduct History: Current - no  Preception: [place] to person, [place] to time, [place] to place, [place] to situation  Attention:Normal: No  Attention: Unable to concentrate  Thought Processes: Circumstantial  Thought Content:Normal: No  Thought Content: Paranoia  Depression Symptoms: No problems reported or observed.  Anxiety Symptoms: No problems reported or observed.  [place]ell Symptoms: No problems reported or observed.  Hallucinations: None  Delusions: No  Delusions: Paranoid  Memory:Normal: No  Memory: Confabulation, Poor recent, Poor remote  Insight and Judgment: No  Insight and Judgment: Poor judgment, Poor insight, Unmotivated    Daily Assessment Last Entry:   Daily Sleep (WDL):

## 2024-10-06 NOTE — PROGRESS NOTES
This RN has reviewed and agrees with HARSHAL Shetty LPN's data collection and has collaborated with this LPN regarding the patient's care plan.

## 2024-10-06 NOTE — PLAN OF CARE
Problem: Pain  Goal: Verbalizes/displays adequate comfort level or baseline comfort level  10/6/2024 0054 by Ovi Vital RN  Outcome: Progressing  Note: Denies pain at the moment   10/5/2024 1516 by Hazel Shetty LPN  Outcome: Progressing  Note: Medication given for pain, see MAR     Problem: Risk for Elopement  Goal: Patient will not exit the unit/facility without proper excort  10/6/2024 0054 by Ovi Vital RN  Outcome: Progressing  Flowsheets (Taken 10/6/2024 0038)  Nursing Interventions for Elopement Risk: Reduce environmental triggers  Note: Patient would not try to leave unit without escort   10/5/2024 1516 by Hazel Shetty LPN  Outcome: Progressing  Flowsheets (Taken 10/5/2024 0800)  Nursing Interventions for Elopement Risk: Reduce environmental triggers  Note: Patient has not attempt to exit the facility, will monitor     Problem: Safety - Adult  Goal: Free from fall injury  10/6/2024 0054 by Ovi Vital RN  Outcome: Progressing  Note: Free from fall  10/5/2024 1516 by Hazel Shetty LPN  Outcome: Progressing  Note: Patient remained free from falls     Problem: Skin/Tissue Integrity  Goal: Absence of new skin breakdown  Description: 1.  Monitor for areas of redness and/or skin breakdown  2.  Assess vascular access sites hourly  3.  Every 4-6 hours minimum:  Change oxygen saturation probe site  4.  Every 4-6 hours:  If on nasal continuous positive airway pressure, respiratory therapy assess nares and determine need for appliance change or resting period.  10/6/2024 0054 by Ovi Vital RN  Outcome: Progressing  Note: Absence of new skin breakdown   10/5/2024 1516 by Hazel Shetty LPN  Outcome: Progressing  Note: No new skin breakdown, will continue to monitor     Problem: Anxiety  Goal: Will report anxiety at manageable levels  Description: INTERVENTIONS:  1. Administer medication as ordered  2. Teach and rehearse alternative coping skills  3.  Assess patient/family anxiety and grief process related to change in body image, loss of functional status, loss of sense of self, and forgiveness  Note: Pt has attended 0/3 group therapy sessions offered thus far today. Pt has been given maximum verbal encouragement to attend group therapy sessions, pt has been isolative to his room the majority of the day. Intermittently, pt is seen out on the unit interacting with others. Plan to continue to monitor progress and encourage participation in group therapy programming.    10/5/2024 1516 by Hazel Shetty LPN  Outcome: Not Progressing  Note: Patient did not discuss this shift     Problem: Discharge Planning  Goal: Discharge to home or other facility with appropriate resources  10/6/2024 0054 by Ovi Vital RN  Outcome: Progressing  Flowsheets (Taken 10/6/2024 0038)  Discharge to home or other facility with appropriate resources: Arrange for needed discharge resources and transportation as appropriate  Note: In progress  10/5/2024 1516 by Hazel Shetty LPN  Outcome: Not Progressing  Note: Discharge planning is yet to be determined     Problem: Musculoskeletal - Adult  Goal: Return mobility to safest level of function  10/6/2024 0054 by Ovi Vital, OTONIEL  Outcome: Progressing  Flowsheets (Taken 10/6/2024 0038)  Return Mobility to Safest Level of Function: Assist with transfers and ambulation using safe patient handling equipment as needed  Note: Assist with ADL and tranfers   10/5/2024 1516 by Hazel Shetty LPN  Outcome: Not Progressing  Flowsheets (Taken 10/5/2024 0800)  Return Mobility to Safest Level of Function:   Assist with transfers and ambulation using safe patient handling equipment as needed   Ensure adequate protection for wounds/incisions during mobilization  Note: Patient has been in bed all day at this time, will monitor

## 2024-10-06 NOTE — BH NOTE
Goal Wrap-Up/Relaxation Group    Date: 10/5/2024  Start Time: 2000  End Time:  2020    Type of Group: Goal Wrap Up and Relaxation    States that goal today was: No goal set.    Goal for today was No goal set today    Patient Did not participate in group/activities      Signature: Tamiko Bass LPN

## 2024-10-06 NOTE — PLAN OF CARE
the day. Intermittently, pt is seen out on the unit interacting with others. Plan to continue to monitor progress and encourage participation in group therapy programming.    10/6/2024 1322 by Hazel Shetty LPN  Outcome: Not Progressing  Flowsheets (Taken 10/6/2024 0800)  Patient/family able to verbalize anxieties, fears, and concerns, and demonstrate effective coping: Provide emotional support, including active listening and acknowledgement of concerns of patient and caregivers  Note: No concern verbalized and encouragement is given to attend groups to gain effective coping strategies     Problem: Discharge Planning  Goal: Discharge to home or other facility with appropriate resources  10/6/2024 1322 by Hazel Shetty LPN  Outcome: Not Progressing  Note: Discharge planning is yet to be scheduled

## 2024-10-07 LAB
AMPHETAMINES UR QL SCN: NEGATIVE
BACTERIA URNS QL MICRO: ABNORMAL /HPF
BARBITURATES UR QL SCN: NEGATIVE
BENZODIAZ UR QL SCN: NEGATIVE
BILIRUB UR QL STRIP.AUTO: NEGATIVE
BZE UR QL SCN: POSITIVE
CANNABINOIDS UR QL SCN: NEGATIVE
CASTS #/AREA URNS LPF: ABNORMAL /LPF
CASTS 2: ABNORMAL /LPF
CHARACTER UR: CLEAR
COLOR, UA: YELLOW
CRYSTALS URNS MICRO: ABNORMAL
EPITHELIAL CELLS, UA: ABNORMAL /HPF
FENTANYL: NEGATIVE
GLUCOSE UR QL STRIP.AUTO: NEGATIVE MG/DL
HGB UR QL STRIP.AUTO: NEGATIVE
KETONES UR QL STRIP.AUTO: NEGATIVE
MISCELLANEOUS 2: ABNORMAL
NITRITE UR QL STRIP: POSITIVE
OPIATES UR QL SCN: NEGATIVE
OXYCODONE: POSITIVE
PCP UR QL SCN: NEGATIVE
PH UR STRIP.AUTO: 6 [PH] (ref 5–9)
PROT UR STRIP.AUTO-MCNC: NEGATIVE MG/DL
RBC URINE: ABNORMAL /HPF
RENAL EPI CELLS #/AREA URNS HPF: ABNORMAL /[HPF]
SP GR UR REFRACT.AUTO: 1.02 (ref 1–1.03)
UROBILINOGEN, URINE: 1 EU/DL (ref 0–1)
WBC #/AREA URNS HPF: ABNORMAL /HPF
WBC #/AREA URNS HPF: ABNORMAL /[HPF]
YEAST LIKE FUNGI URNS QL MICRO: ABNORMAL

## 2024-10-07 PROCEDURE — 80307 DRUG TEST PRSMV CHEM ANLYZR: CPT

## 2024-10-07 PROCEDURE — 1240000000 HC EMOTIONAL WELLNESS R&B

## 2024-10-07 PROCEDURE — 99232 SBSQ HOSP IP/OBS MODERATE 35: CPT | Performed by: PSYCHIATRY & NEUROLOGY

## 2024-10-07 PROCEDURE — 6370000000 HC RX 637 (ALT 250 FOR IP): Performed by: PHYSICIAN ASSISTANT

## 2024-10-07 PROCEDURE — 87077 CULTURE AEROBIC IDENTIFY: CPT

## 2024-10-07 PROCEDURE — 87186 SC STD MICRODIL/AGAR DIL: CPT

## 2024-10-07 PROCEDURE — 87086 URINE CULTURE/COLONY COUNT: CPT

## 2024-10-07 PROCEDURE — 81001 URINALYSIS AUTO W/SCOPE: CPT

## 2024-10-07 PROCEDURE — 6370000000 HC RX 637 (ALT 250 FOR IP): Performed by: PSYCHIATRY & NEUROLOGY

## 2024-10-07 RX ORDER — ARIPIPRAZOLE 2 MG/1
2 TABLET ORAL NIGHTLY
Status: DISCONTINUED | OUTPATIENT
Start: 2024-10-07 | End: 2024-10-10

## 2024-10-07 RX ADMIN — OXYCODONE HYDROCHLORIDE 30 MG: 15 TABLET ORAL at 02:08

## 2024-10-07 RX ADMIN — ATORVASTATIN CALCIUM 10 MG: 10 TABLET, FILM COATED ORAL at 21:09

## 2024-10-07 RX ADMIN — GABAPENTIN 800 MG: 400 CAPSULE ORAL at 21:09

## 2024-10-07 RX ADMIN — GABAPENTIN 800 MG: 400 CAPSULE ORAL at 08:12

## 2024-10-07 RX ADMIN — PANTOPRAZOLE SODIUM 40 MG: 40 TABLET, DELAYED RELEASE ORAL at 08:13

## 2024-10-07 RX ADMIN — TAMSULOSIN HYDROCHLORIDE 0.4 MG: 0.4 CAPSULE ORAL at 08:13

## 2024-10-07 RX ADMIN — ARIPIPRAZOLE 2 MG: 2 TABLET ORAL at 21:09

## 2024-10-07 RX ADMIN — DOCUSATE SODIUM 100 MG: 100 CAPSULE, LIQUID FILLED ORAL at 08:11

## 2024-10-07 RX ADMIN — MAGNESIUM HYDROXIDE 30 ML: 1200 LIQUID ORAL at 18:57

## 2024-10-07 RX ADMIN — AMLODIPINE BESYLATE 10 MG: 10 TABLET ORAL at 08:11

## 2024-10-07 RX ADMIN — ASPIRIN 81 MG 81 MG: 81 TABLET ORAL at 08:12

## 2024-10-07 RX ADMIN — DULOXETINE HYDROCHLORIDE 30 MG: 30 CAPSULE, DELAYED RELEASE ORAL at 21:09

## 2024-10-07 RX ADMIN — DOCUSATE SODIUM 100 MG: 100 CAPSULE, LIQUID FILLED ORAL at 21:09

## 2024-10-07 RX ADMIN — DULOXETINE HYDROCHLORIDE 30 MG: 30 CAPSULE, DELAYED RELEASE ORAL at 08:11

## 2024-10-07 RX ADMIN — OXYCODONE HYDROCHLORIDE 30 MG: 15 TABLET ORAL at 10:43

## 2024-10-07 RX ADMIN — DULOXETINE HYDROCHLORIDE 30 MG: 30 CAPSULE, DELAYED RELEASE ORAL at 13:55

## 2024-10-07 RX ADMIN — Medication 1000 UNITS: at 08:13

## 2024-10-07 RX ADMIN — OXYCODONE HYDROCHLORIDE 30 MG: 15 TABLET ORAL at 19:36

## 2024-10-07 RX ADMIN — FOLIC ACID 1 MG: 1 TABLET ORAL at 08:11

## 2024-10-07 RX ADMIN — ACETAMINOPHEN 650 MG: 325 TABLET ORAL at 08:14

## 2024-10-07 RX ADMIN — IBUPROFEN 400 MG: 400 TABLET, FILM COATED ORAL at 16:50

## 2024-10-07 RX ADMIN — GABAPENTIN 800 MG: 400 CAPSULE ORAL at 13:56

## 2024-10-07 ASSESSMENT — PAIN - FUNCTIONAL ASSESSMENT
PAIN_FUNCTIONAL_ASSESSMENT: ACTIVITIES ARE NOT PREVENTED
PAIN_FUNCTIONAL_ASSESSMENT: PREVENTS OR INTERFERES SOME ACTIVE ACTIVITIES AND ADLS
PAIN_FUNCTIONAL_ASSESSMENT: PREVENTS OR INTERFERES SOME ACTIVE ACTIVITIES AND ADLS
PAIN_FUNCTIONAL_ASSESSMENT: ACTIVITIES ARE NOT PREVENTED
PAIN_FUNCTIONAL_ASSESSMENT: ACTIVITIES ARE NOT PREVENTED
PAIN_FUNCTIONAL_ASSESSMENT: PREVENTS OR INTERFERES SOME ACTIVE ACTIVITIES AND ADLS

## 2024-10-07 ASSESSMENT — PAIN DESCRIPTION - ONSET
ONSET: ON-GOING
ONSET: ON-GOING

## 2024-10-07 ASSESSMENT — PAIN DESCRIPTION - DESCRIPTORS
DESCRIPTORS: ACHING;BURNING;SHOOTING
DESCRIPTORS: ACHING
DESCRIPTORS: ACHING;DISCOMFORT;THROBBING
DESCRIPTORS: THROBBING
DESCRIPTORS: ACHING
DESCRIPTORS: ACHING
DESCRIPTORS: THROBBING

## 2024-10-07 ASSESSMENT — PAIN SCALES - GENERAL
PAINLEVEL_OUTOF10: 7
PAINLEVEL_OUTOF10: 9
PAINLEVEL_OUTOF10: 9
PAINLEVEL_OUTOF10: 10
PAINLEVEL_OUTOF10: 4
PAINLEVEL_OUTOF10: 10
PAINLEVEL_OUTOF10: 2
PAINLEVEL_OUTOF10: 8

## 2024-10-07 ASSESSMENT — PAIN DESCRIPTION - LOCATION
LOCATION: FOOT
LOCATION: FOOT
LOCATION: BACK;KNEE
LOCATION: BACK
LOCATION: FOOT
LOCATION: FOOT;OTHER (COMMENT)
LOCATION: FOOT

## 2024-10-07 ASSESSMENT — PAIN DESCRIPTION - ORIENTATION
ORIENTATION: RIGHT
ORIENTATION: RIGHT
ORIENTATION: LEFT;RIGHT
ORIENTATION: MID
ORIENTATION: RIGHT;LEFT
ORIENTATION: LEFT
ORIENTATION: RIGHT;LEFT

## 2024-10-07 ASSESSMENT — PAIN DESCRIPTION - FREQUENCY
FREQUENCY: CONTINUOUS
FREQUENCY: CONTINUOUS

## 2024-10-07 ASSESSMENT — PAIN SCALES - WONG BAKER: WONGBAKER_NUMERICALRESPONSE: HURTS A LITTLE BIT

## 2024-10-07 NOTE — PROGRESS NOTES
Left lower extremity wound cleansed, dressing changed per order. Ace wrap bandage applied. Wound had purulent drainage and foul odor. Right foot wound cleansed, new dressing applied per order. Wound had purulent drainage and foul odor. Pt was calm and cooperative during dressing change. Pt complained of throbbing, burning pain in left lower extremity. Rosalia FREDERICK notified of pain level.

## 2024-10-07 NOTE — PLAN OF CARE
Problem: Pain  Goal: Verbalizes/displays adequate comfort level or baseline comfort level  10/6/2024 2157 by Trena Lozano RN  Outcome: Progressing  Flowsheets (Taken 10/6/2024 1928)  Verbalizes/displays adequate comfort level or baseline comfort level:   Encourage patient to monitor pain and request assistance   Assess pain using appropriate pain scale   Administer analgesics based on type and severity of pain and evaluate response   Implement non-pharmacological measures as appropriate and evaluate response   Consider cultural and social influences on pain and pain management   Notify Licensed Independent Practitioner if interventions unsuccessful or patient reports new pain  Note: Assess pain using 0-10 pain scale; administer medication as needed per orders.  10/6/2024 1322 by Hazel Shetty LPN  Outcome: Not Progressing  Note: Medication given for pain, see MAR     Problem: Risk for Elopement  Goal: Patient will not exit the unit/facility without proper excort  10/6/2024 2157 by Trena Lozano RN  Outcome: Progressing  Flowsheets (Taken 10/6/2024 1928)  Nursing Interventions for Elopement Risk:   Assist with personal care needs such as toileting, eating, dressing, as needed to reduce the risk of wandering   Make sure patient has all necessary personal care items   Reduce environmental triggers   Shoes and clothing collected and placed in gown attire  Note: 1:1 sitter at bedside assisting with all personal care needs; pt provided with all necessary personal care items. Lights dimmed to promote rest.   10/6/2024 1322 by Hazel Shetty LPN  Outcome: Progressing  Note: Patient has not attempted to exit the facility, will monitor     Problem: Safety - Adult  Goal: Free from fall injury  10/6/2024 2157 by Trena Lozano, RN  Outcome: Progressing  Note: Pt educated regarding safety; room kept free of clutter, 1:1 sitter at bedside assisting pt with care needs and transferring.   10/6/2024 1322 by Hazel Shetty  behavior: Treat underlying conditions and offer medication as ordered  Note: Pt encouraged to communicate all questions and concerns regarding care with staff and providers, and to actively engage in setting goals and plan of care.   10/6/2024 1322 by Hazel Shetty LPN  Outcome: Progressing  Note: Patient is cooperative with staff and doctor's orders and did demonstrate appropriate behaviors     Problem: Discharge Planning  Goal: Discharge to home or other facility with appropriate resources  10/6/2024 2157 by Trena Lozano, RN  Outcome: Progressing  Flowsheets (Taken 10/6/2024 1928)  Discharge to home or other facility with appropriate resources:   Identify barriers to discharge with patient and caregiver   Arrange for needed discharge resources and transportation as appropriate   Identify discharge learning needs (meds, wound care, etc)   Refer to discharge planning if patient needs post-hospital services based on physician order or complex needs related to functional status, cognitive ability or social support system  Note: Pt encouraged to discuss concerns related to discharge, and to identify any outpatient needs.   10/6/2024 1322 by Hazel Shetty LPN  Outcome: Not Progressing  Note: Discharge planning is yet to be scheduled     Problem: Musculoskeletal - Adult  Goal: Return mobility to safest level of function  10/6/2024 2157 by Trena Lozano, RN  Outcome: Progressing  Flowsheets (Taken 10/6/2024 1928)  Return Mobility to Safest Level of Function:   Assess patient stability and activity tolerance for standing, transferring and ambulating with or without assistive devices   Assist with transfers and ambulation using safe patient handling equipment as needed   Ensure adequate protection for wounds/incisions during mobilization   Instruct patient/family in ordered activity level  Note: Pt encouraged to practice self-care and increase activity level; assistance provided for pt as needed and

## 2024-10-07 NOTE — PLAN OF CARE
Problem: Musculoskeletal - Adult  Goal: Return mobility to safest level of function  Outcome: Not Progressing     Problem: Pain  Goal: Verbalizes/displays adequate comfort level or baseline comfort level  Outcome: Progressing  Flowsheets (Taken 10/7/2024 1206)  Verbalizes/displays adequate comfort level or baseline comfort level:   Encourage patient to monitor pain and request assistance   Administer analgesics based on type and severity of pain and evaluate response     Problem: Risk for Elopement  Goal: Patient will not exit the unit/facility without proper excort  Outcome: Progressing  Flowsheets (Taken 10/7/2024 1206)  Nursing Interventions for Elopement Risk:   Assist with personal care needs such as toileting, eating, dressing, as needed to reduce the risk of wandering   Make sure patient has all necessary personal care items  Note: No attempts to leave the unit     Problem: Safety - Adult  Goal: Free from fall injury  Outcome: Progressing  Note: No falls, sitter at bedside to assist as needed     Problem: Anxiety  Goal: Will report anxiety at manageable levels  Description: INTERVENTIONS:  1. Administer medication as ordered  2. Teach and rehearse alternative coping skills  3. Provide emotional support with 1:1 interaction with staff  Outcome: Progressing  Note: Patient reports some anxiety.Rates it a 5 at present     Problem: Coping  Goal: Pt/Family able to verbalize concerns and demonstrate effective coping strategies  Description: INTERVENTIONS:  1. Assist patient/family to identify coping skills, available support systems and cultural and spiritual values  2. Provide emotional support, including active listening and acknowledgement of concerns of patient and caregivers  3. Reduce environmental stimuli, as able  4. Instruct patient/family in relaxation techniques, as appropriate  5. Assess for spiritual pain/suffering and initiate Spiritual Care, Psychosocial Clinical Specialist consults as  barriers to discharge with patient and caregiver   Refer to discharge planning if patient needs post-hospital services based on physician order or complex needs related to functional status, cognitive ability or social support system  Note: Remains in hospital, discussed possible discharge needs.       Problem: Skin/Tissue Integrity  Goal: Absence of new skin breakdown  Description: 1.  Monitor for areas of redness and/or skin breakdown  2.  Assess vascular access sites hourly  3.  Every 4-6 hours minimum:  Change oxygen saturation probe site  4.  Every 4-6 hours:  If on nasal continuous positive airway pressure, respiratory therapy assess nares and determine need for appliance change or resting period.  Note: No new breakdown, left leg wrapped,      Problem: Musculoskeletal - Adult  Goal: Return mobility to safest level of function  Outcome: Not Progressing   Care plan reviewed with patient and he contributes to goal setting and voices understanding of plan of care.
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
## 2024-10-07 NOTE — BH NOTE
Pt declined to attend 0900 Community Meeting and Goal group therapy session offered today. Pt was provided maximum verbal encouragement to attend group therapy session. Pt remained resting in bed. No daily goal was obtained

## 2024-10-08 PROCEDURE — 90833 PSYTX W PT W E/M 30 MIN: CPT | Performed by: PSYCHIATRY & NEUROLOGY

## 2024-10-08 PROCEDURE — 6370000000 HC RX 637 (ALT 250 FOR IP): Performed by: PHYSICIAN ASSISTANT

## 2024-10-08 PROCEDURE — 6370000000 HC RX 637 (ALT 250 FOR IP): Performed by: PSYCHIATRY & NEUROLOGY

## 2024-10-08 PROCEDURE — 99232 SBSQ HOSP IP/OBS MODERATE 35: CPT | Performed by: PSYCHIATRY & NEUROLOGY

## 2024-10-08 PROCEDURE — 1240000000 HC EMOTIONAL WELLNESS R&B

## 2024-10-08 RX ORDER — BISACODYL 10 MG
10 SUPPOSITORY, RECTAL RECTAL DAILY PRN
Status: DISCONTINUED | OUTPATIENT
Start: 2024-10-08 | End: 2024-10-09

## 2024-10-08 RX ADMIN — OXYCODONE HYDROCHLORIDE 30 MG: 15 TABLET ORAL at 20:54

## 2024-10-08 RX ADMIN — DULOXETINE HYDROCHLORIDE 30 MG: 30 CAPSULE, DELAYED RELEASE ORAL at 12:44

## 2024-10-08 RX ADMIN — DOCUSATE SODIUM 100 MG: 100 CAPSULE, LIQUID FILLED ORAL at 20:54

## 2024-10-08 RX ADMIN — IBUPROFEN 400 MG: 400 TABLET, FILM COATED ORAL at 08:36

## 2024-10-08 RX ADMIN — OXYCODONE HYDROCHLORIDE 30 MG: 15 TABLET ORAL at 04:12

## 2024-10-08 RX ADMIN — GABAPENTIN 800 MG: 400 CAPSULE ORAL at 12:44

## 2024-10-08 RX ADMIN — GABAPENTIN 800 MG: 400 CAPSULE ORAL at 20:30

## 2024-10-08 RX ADMIN — AMLODIPINE BESYLATE 10 MG: 10 TABLET ORAL at 08:33

## 2024-10-08 RX ADMIN — OXYCODONE HYDROCHLORIDE 30 MG: 15 TABLET ORAL at 14:06

## 2024-10-08 RX ADMIN — ATORVASTATIN CALCIUM 10 MG: 10 TABLET, FILM COATED ORAL at 20:29

## 2024-10-08 RX ADMIN — TAMSULOSIN HYDROCHLORIDE 0.4 MG: 0.4 CAPSULE ORAL at 08:33

## 2024-10-08 RX ADMIN — DOCUSATE SODIUM 100 MG: 100 CAPSULE, LIQUID FILLED ORAL at 08:36

## 2024-10-08 RX ADMIN — Medication 1000 UNITS: at 08:33

## 2024-10-08 RX ADMIN — ARIPIPRAZOLE 2 MG: 2 TABLET ORAL at 20:28

## 2024-10-08 RX ADMIN — ACETAMINOPHEN 650 MG: 325 TABLET ORAL at 00:05

## 2024-10-08 RX ADMIN — GABAPENTIN 800 MG: 400 CAPSULE ORAL at 08:33

## 2024-10-08 RX ADMIN — DULOXETINE HYDROCHLORIDE 30 MG: 30 CAPSULE, DELAYED RELEASE ORAL at 20:29

## 2024-10-08 RX ADMIN — ASPIRIN 81 MG 81 MG: 81 TABLET ORAL at 08:37

## 2024-10-08 RX ADMIN — FOLIC ACID 1 MG: 1 TABLET ORAL at 08:33

## 2024-10-08 RX ADMIN — DULOXETINE HYDROCHLORIDE 30 MG: 30 CAPSULE, DELAYED RELEASE ORAL at 08:32

## 2024-10-08 RX ADMIN — CYCLOBENZAPRINE 10 MG: 10 TABLET, FILM COATED ORAL at 20:54

## 2024-10-08 RX ADMIN — PANTOPRAZOLE SODIUM 40 MG: 40 TABLET, DELAYED RELEASE ORAL at 08:36

## 2024-10-08 ASSESSMENT — PAIN DESCRIPTION - FREQUENCY
FREQUENCY: CONTINUOUS

## 2024-10-08 ASSESSMENT — PAIN SCALES - GENERAL
PAINLEVEL_OUTOF10: 10
PAINLEVEL_OUTOF10: 8
PAINLEVEL_OUTOF10: 0
PAINLEVEL_OUTOF10: 8
PAINLEVEL_OUTOF10: 9
PAINLEVEL_OUTOF10: 5
PAINLEVEL_OUTOF10: 3
PAINLEVEL_OUTOF10: 6
PAINLEVEL_OUTOF10: 3
PAINLEVEL_OUTOF10: 3
PAINLEVEL_OUTOF10: 8
PAINLEVEL_OUTOF10: 4

## 2024-10-08 ASSESSMENT — PAIN DESCRIPTION - LOCATION
LOCATION: FOOT
LOCATION: BACK;FOOT
LOCATION: FOOT

## 2024-10-08 ASSESSMENT — PAIN DESCRIPTION - DESCRIPTORS
DESCRIPTORS: ACHING
DESCRIPTORS: ACHING;DISCOMFORT;SORE
DESCRIPTORS: ACHING;THROBBING
DESCRIPTORS: ACHING;THROBBING
DESCRIPTORS: ACHING
DESCRIPTORS: ACHING
DESCRIPTORS: ACHING;SORE
DESCRIPTORS: ACHING

## 2024-10-08 ASSESSMENT — PAIN DESCRIPTION - PAIN TYPE
TYPE: CHRONIC PAIN

## 2024-10-08 ASSESSMENT — PAIN - FUNCTIONAL ASSESSMENT
PAIN_FUNCTIONAL_ASSESSMENT: PREVENTS OR INTERFERES SOME ACTIVE ACTIVITIES AND ADLS
PAIN_FUNCTIONAL_ASSESSMENT: PREVENTS OR INTERFERES WITH ALL ACTIVE AND SOME PASSIVE ACTIVITIES
PAIN_FUNCTIONAL_ASSESSMENT: PREVENTS OR INTERFERES WITH ALL ACTIVE AND SOME PASSIVE ACTIVITIES
PAIN_FUNCTIONAL_ASSESSMENT: PREVENTS OR INTERFERES SOME ACTIVE ACTIVITIES AND ADLS
PAIN_FUNCTIONAL_ASSESSMENT: PREVENTS OR INTERFERES SOME ACTIVE ACTIVITIES AND ADLS

## 2024-10-08 ASSESSMENT — PAIN DESCRIPTION - ORIENTATION
ORIENTATION: RIGHT
ORIENTATION: RIGHT;MID
ORIENTATION: RIGHT

## 2024-10-08 ASSESSMENT — PAIN DESCRIPTION - ONSET
ONSET: ON-GOING

## 2024-10-08 ASSESSMENT — PAIN SCALES - WONG BAKER
WONGBAKER_NUMERICALRESPONSE: NO HURT
WONGBAKER_NUMERICALRESPONSE: HURTS WHOLE LOT

## 2024-10-08 NOTE — PROGRESS NOTES
Case management- Call from Man, patient's  through the [facility] Care waiver. Man states that their , [facility]ina, has been helping patient fill out and mail housing applications but is no other options currently as it is a long process. Man states that once patient goes back to [facility], then [facility] can help him fill out applications to go elsewhere. Man states that patient has a difficult time completing the things that he needs to do. Man asks if he is able to come up to the unit to complete questionnaire with patient that he was supposed to be meeting with patient this week at [facility]. Man states that he will be up to the unit tomorrow, Wednesday morning at 9:00am. Man's direct phone number is [phone].

## 2024-10-08 NOTE — GROUP NOTE
Group Therapy Note    Date: 10/8/2024    Group Start Time: 1330  Group End Time: 1415  Group Topic: Healthy Living/Wellness    [facility] Adult [facility] 7E    Hazel Shetty LPN        Group Therapy Note    Attendees: 8       Notes:  did not attend      Signature:  Hazel Shetty LPN

## 2024-10-08 NOTE — PLAN OF CARE
Problem: Pain  Goal: Verbalizes/displays adequate comfort level or baseline comfort level  Outcome: Progressing  Flowsheets (Taken 10/7/2024 1206 by Rosalia Agarwal, RN)  Verbalizes/displays adequate comfort level or baseline comfort level:   Encourage patient to monitor pain and request assistance   Administer analgesics based on type and severity of pain and evaluate response  Note: Chronic pain to right foot, refer to flow sheets.     Problem: Risk for Elopement  Goal: Patient will not exit the unit/facility without proper excort  Outcome: Progressing  Flowsheets (Taken 10/7/2024 1206 by Rosalia Agarwal, RN)  Nursing Interventions for Elopement Risk:   Assist with personal care needs such as toileting, eating, dressing, as needed to reduce the risk of wandering   Make sure patient has all necessary personal care items  Note: No attempts at elopement noted so far this shift.     Problem: Safety - Adult  Goal: Free from fall injury  Outcome: Progressing  Flowsheets (Taken 10/5/2024 0040)  Free From Fall Injury: Instruct family/caregiver on patient safety  Note: No falls noted.      Problem: Skin/Tissue Integrity  Goal: Absence of new skin breakdown  Description: 1.  Monitor for areas of redness and/or skin breakdown  2.  Assess vascular access sites hourly  3.  Every 4-6 hours minimum:  Change oxygen saturation probe site  4.  Every 4-6 hours:  If on nasal continuous positive airway pressure, respiratory therapy assess nares and determine need for appliance change or resting period.  Outcome: Progressing  Note: Unable to assess wounds to right foot at this time, patient is refusing dressing change. Abrasions to left foot free from drainage or odor.     Problem: Anxiety  Goal: Will report anxiety at manageable levels  Description: INTERVENTIONS:  1. Administer medication as ordered  2. Teach and rehearse alternative coping skills  3. Provide emotional support with 1:1 interaction with staff  Outcome:

## 2024-10-08 NOTE — PLAN OF CARE
Problem: Coping  Goal: Pt/Family able to verbalize concerns and demonstrate effective coping strategies  Description: INTERVENTIONS:  1. Assist patient/family to identify coping skills, available support systems and cultural and spiritual values  2. Provide emotional support, including active listening and acknowledgement of concerns of patient and caregivers  3. Reduce environmental stimuli, as able  4. Instruct patient/family in relaxation techniques, as appropriate  5. Assess for spiritual pain/suffering and initiate Spiritual Care, Psychosocial Clinical Specialist consults as needed  10/8/2024 1602 by Nicole Castaneda OT  Outcome: Not Progressing  Flowsheets (Taken 10/8/2024 1602)  Patient/family able to verbalize anxieties, fears, and concerns, and demonstrate effective coping:   Assist patient/family to identify coping skills, available support systems and cultural and spiritual values   Provide emotional support, including active listening and acknowledgement of concerns of patient and caregivers  Note: Pt has attended 0/6 group therapy sessions offered thus far today. Pt has been given maximum verbal encouragement to attend group therapy sessions, pt has been isolative to his room the majority of the day. Intermittently, pt is seen out on the unit interacting with others. Plan to continue to monitor progress and encourage participation in group therapy programming.    10/8/2024 1323 by Nadege Byrne, RN  Outcome: Progressing     Problem: Musculoskeletal - Adult  Goal: Return mobility to safest level of function  10/8/2024 1323 by Nadege Byrne, RN  Outcome: Not Progressing  Flowsheets (Taken 10/8/2024 1323)  Return Mobility to Safest Level of Function:   Assess patient stability and activity tolerance for standing, transferring and ambulating with or without assistive devices   Assist with transfers and ambulation using safe patient handling equipment as needed  Note: Assistance needed with transfer to  wheelchair. Patient is non-weight bearing. Able to complete adls with assistance. Set-up only for bathing.

## 2024-10-08 NOTE — PROGRESS NOTES
Patient refused dressing change to right extremity multiple times this shift, states that \" he wants to wait until night time after his pain medication.\"

## 2024-10-08 NOTE — PLAN OF CARE
Progressing  Flowsheets (Taken 10/7/2024 0758 by Rosalia Agarwal, RN)  Patient/family able to verbalize anxieties, fears, and concerns, and demonstrate effective coping: Provide emotional support, including active listening and acknowledgement of concerns of patient and caregivers  10/7/2024 1206 by Rosalia Agarwal, RN  Outcome: Progressing  Flowsheets (Taken 10/7/2024 0758)  Patient/family able to verbalize anxieties, fears, and concerns, and demonstrate effective coping: Provide emotional support, including active listening and acknowledgement of concerns of patient and caregivers  Note: Patient reports some coping skills.  Patient is did not attend therapeutic groups to gain insight on mental illness and learn positive coping skills.        Problem: Change in Body Image  Goal: Pt/Family communicate acceptance of loss or change in body image and feel psychological comfort and peace  Description: INTERVENTIONS:  1. Assess patient/family anxiety and grief process related to change in body image, loss of functional status, loss of sense of self, and forgiveness  2. Provide emotional and spiritual support  3. Provide information about the patient's health status with consideration of family and cultural values  4. Communicate willingness to discuss loss and facilitate grief process with patient/family as appropriate  5. Emphasize sustaining relationships within family system and community, or dat/spiritual traditions  6. Initiate Spiritual Care, Psychosocial Clinical Specialist consult as needed  10/7/2024 1206 by Rosalia Agarwal, RN  Outcome: Progressing  Flowsheets (Taken 10/7/2024 1206)  Patient/family communicate acceptance of loss or change in body image and feel psychological comfort and peace:   Assess patient/family anxiety and grief process related to change in body image, loss of functional status, loss of sense of self, and forgiveness   Provide emotional and spiritual support  Note: Patient verbalizes  Progressing  Flowsheets (Taken 10/7/2024 0750)  Will cooperate with staff recommendations and doctor's orders and will demonstrate appropriate behavior: Treat underlying conditions and offer medication as ordered  Note: Cooperative with medications, wanting treatment to help him feel better.     Problem: Discharge Planning  Goal: Discharge to home or other facility with appropriate resources  10/7/2024 2223 by Samson Rivera, RN  Outcome: Progressing  Flowsheets (Taken 10/7/2024 1206 by Rosalia Agarwal, RN)  Discharge to home or other facility with appropriate resources:   Identify barriers to discharge with patient and caregiver   Refer to discharge planning if patient needs post-hospital services based on physician order or complex needs related to functional status, cognitive ability or social support system  10/7/2024 1206 by Rosalia Agarwal, RN  Outcome: Progressing  Flowsheets (Taken 10/7/2024 1206)  Discharge to home or other facility with appropriate resources:   Identify barriers to discharge with patient and caregiver   Refer to discharge planning if patient needs post-hospital services based on physician order or complex needs related to functional status, cognitive ability or social support system  Note: Remains in hospital, discussed possible discharge needs.

## 2024-10-09 ENCOUNTER — APPOINTMENT (OUTPATIENT)
Dept: GENERAL RADIOLOGY | Age: 54
DRG: 885 | End: 2024-10-09
Payer: MEDICARE

## 2024-10-09 LAB
ANION GAP SERPL CALC-SCNC: 11 MEQ/L (ref 8–16)
BACTERIA UR CULT: ABNORMAL
BUN SERPL-MCNC: 19 MG/DL (ref 7–22)
CALCIUM SERPL-MCNC: 8.9 MG/DL (ref 8.5–10.5)
CHLORIDE SERPL-SCNC: 99 MEQ/L (ref 98–111)
CO2 SERPL-SCNC: 25 MEQ/L (ref 23–33)
CREAT SERPL-MCNC: 0.9 MG/DL (ref 0.4–1.2)
CRP SERPL-MCNC: 6.52 MG/DL (ref 0–1)
DEPRECATED RDW RBC AUTO: 40.9 FL (ref 35–45)
ERYTHROCYTE [DISTWIDTH] IN BLOOD BY AUTOMATED COUNT: 12.1 % (ref 11.5–14.5)
ERYTHROCYTE [SEDIMENTATION RATE] IN BLOOD BY WESTERGREN METHOD: 43 MM/HR (ref 0–10)
GFR SERPL CREATININE-BSD FRML MDRD: > 90 ML/MIN/1.73M2
GLUCOSE SERPL-MCNC: 91 MG/DL (ref 70–108)
HCT VFR BLD AUTO: 41 % (ref 42–52)
HGB BLD-MCNC: 13.8 GM/DL (ref 14–18)
MCH RBC QN AUTO: 31 PG (ref 26–33)
MCHC RBC AUTO-ENTMCNC: 33.7 GM/DL (ref 32.2–35.5)
MCV RBC AUTO: 92.1 FL (ref 80–94)
ORGANISM: ABNORMAL
PLATELET # BLD AUTO: 247 THOU/MM3 (ref 130–400)
PMV BLD AUTO: 9.6 FL (ref 9.4–12.4)
POTASSIUM SERPL-SCNC: 4.5 MEQ/L (ref 3.5–5.2)
RBC # BLD AUTO: 4.45 MILL/MM3 (ref 4.7–6.1)
SODIUM SERPL-SCNC: 135 MEQ/L (ref 135–145)
WBC # BLD AUTO: 5.8 THOU/MM3 (ref 4.8–10.8)

## 2024-10-09 PROCEDURE — 99222 1ST HOSP IP/OBS MODERATE 55: CPT | Performed by: PHYSICIAN ASSISTANT

## 2024-10-09 PROCEDURE — 99232 SBSQ HOSP IP/OBS MODERATE 35: CPT | Performed by: PSYCHIATRY & NEUROLOGY

## 2024-10-09 PROCEDURE — 97530 THERAPEUTIC ACTIVITIES: CPT

## 2024-10-09 PROCEDURE — 90833 PSYTX W PT W E/M 30 MIN: CPT | Performed by: PSYCHIATRY & NEUROLOGY

## 2024-10-09 PROCEDURE — 73630 X-RAY EXAM OF FOOT: CPT

## 2024-10-09 PROCEDURE — 85027 COMPLETE CBC AUTOMATED: CPT

## 2024-10-09 PROCEDURE — 6370000000 HC RX 637 (ALT 250 FOR IP): Performed by: PHYSICIAN ASSISTANT

## 2024-10-09 PROCEDURE — 80048 BASIC METABOLIC PNL TOTAL CA: CPT

## 2024-10-09 PROCEDURE — 86140 C-REACTIVE PROTEIN: CPT

## 2024-10-09 PROCEDURE — 6370000000 HC RX 637 (ALT 250 FOR IP): Performed by: PSYCHIATRY & NEUROLOGY

## 2024-10-09 PROCEDURE — 97162 PT EVAL MOD COMPLEX 30 MIN: CPT

## 2024-10-09 PROCEDURE — 1240000000 HC EMOTIONAL WELLNESS R&B

## 2024-10-09 PROCEDURE — 85651 RBC SED RATE NONAUTOMATED: CPT

## 2024-10-09 PROCEDURE — 36415 COLL VENOUS BLD VENIPUNCTURE: CPT

## 2024-10-09 RX ORDER — IBUPROFEN 800 MG/1
800 TABLET, FILM COATED ORAL EVERY 8 HOURS SCHEDULED
Status: DISCONTINUED | OUTPATIENT
Start: 2024-10-09 | End: 2024-10-14 | Stop reason: HOSPADM

## 2024-10-09 RX ORDER — POLYETHYLENE GLYCOL 3350 17 G/17G
17 POWDER, FOR SOLUTION ORAL DAILY
Status: DISCONTINUED | OUTPATIENT
Start: 2024-10-09 | End: 2024-10-14 | Stop reason: HOSPADM

## 2024-10-09 RX ORDER — CIPROFLOXACIN 500 MG/1
500 TABLET, FILM COATED ORAL EVERY 12 HOURS SCHEDULED
Status: DISCONTINUED | OUTPATIENT
Start: 2024-10-09 | End: 2024-10-12

## 2024-10-09 RX ORDER — SENNOSIDES A AND B 8.6 MG/1
1 TABLET, FILM COATED ORAL NIGHTLY
Status: DISCONTINUED | OUTPATIENT
Start: 2024-10-09 | End: 2024-10-11

## 2024-10-09 RX ORDER — OXYCODONE HYDROCHLORIDE 15 MG/1
30 TABLET ORAL EVERY 8 HOURS SCHEDULED
Status: DISCONTINUED | OUTPATIENT
Start: 2024-10-09 | End: 2024-10-14 | Stop reason: HOSPADM

## 2024-10-09 RX ORDER — ACETAMINOPHEN 325 MG/1
650 TABLET ORAL EVERY 8 HOURS
Status: DISCONTINUED | OUTPATIENT
Start: 2024-10-09 | End: 2024-10-14 | Stop reason: HOSPADM

## 2024-10-09 RX ADMIN — TAMSULOSIN HYDROCHLORIDE 0.4 MG: 0.4 CAPSULE ORAL at 07:53

## 2024-10-09 RX ADMIN — PANTOPRAZOLE SODIUM 40 MG: 40 TABLET, DELAYED RELEASE ORAL at 07:59

## 2024-10-09 RX ADMIN — DULOXETINE HYDROCHLORIDE 30 MG: 30 CAPSULE, DELAYED RELEASE ORAL at 20:48

## 2024-10-09 RX ADMIN — IBUPROFEN 400 MG: 400 TABLET, FILM COATED ORAL at 07:51

## 2024-10-09 RX ADMIN — DULOXETINE HYDROCHLORIDE 30 MG: 30 CAPSULE, DELAYED RELEASE ORAL at 12:54

## 2024-10-09 RX ADMIN — FOLIC ACID 1 MG: 1 TABLET ORAL at 07:52

## 2024-10-09 RX ADMIN — OXYCODONE HYDROCHLORIDE 30 MG: 15 TABLET ORAL at 11:27

## 2024-10-09 RX ADMIN — OXYCODONE HYDROCHLORIDE 30 MG: 15 TABLET ORAL at 21:31

## 2024-10-09 RX ADMIN — ARIPIPRAZOLE 2 MG: 2 TABLET ORAL at 20:48

## 2024-10-09 RX ADMIN — CYCLOBENZAPRINE 10 MG: 10 TABLET, FILM COATED ORAL at 07:52

## 2024-10-09 RX ADMIN — GABAPENTIN 800 MG: 400 CAPSULE ORAL at 07:52

## 2024-10-09 RX ADMIN — GABAPENTIN 800 MG: 400 CAPSULE ORAL at 12:54

## 2024-10-09 RX ADMIN — ASPIRIN 81 MG 81 MG: 81 TABLET ORAL at 08:00

## 2024-10-09 RX ADMIN — GABAPENTIN 800 MG: 400 CAPSULE ORAL at 20:48

## 2024-10-09 RX ADMIN — AMLODIPINE BESYLATE 10 MG: 10 TABLET ORAL at 07:53

## 2024-10-09 RX ADMIN — ATORVASTATIN CALCIUM 10 MG: 10 TABLET, FILM COATED ORAL at 20:48

## 2024-10-09 RX ADMIN — DOCUSATE SODIUM 100 MG: 100 CAPSULE, LIQUID FILLED ORAL at 07:59

## 2024-10-09 RX ADMIN — DOCUSATE SODIUM 100 MG: 100 CAPSULE, LIQUID FILLED ORAL at 20:48

## 2024-10-09 RX ADMIN — HYDROXYZINE HYDROCHLORIDE 50 MG: 25 TABLET, FILM COATED ORAL at 07:53

## 2024-10-09 RX ADMIN — ACETAMINOPHEN 650 MG: 325 TABLET ORAL at 21:31

## 2024-10-09 RX ADMIN — Medication 1000 UNITS: at 07:53

## 2024-10-09 RX ADMIN — OXYCODONE HYDROCHLORIDE 30 MG: 15 TABLET ORAL at 05:03

## 2024-10-09 RX ADMIN — DULOXETINE HYDROCHLORIDE 30 MG: 30 CAPSULE, DELAYED RELEASE ORAL at 07:52

## 2024-10-09 RX ADMIN — CIPROFLOXACIN HYDROCHLORIDE 500 MG: 500 TABLET, FILM COATED ORAL at 20:48

## 2024-10-09 RX ADMIN — CYCLOBENZAPRINE 10 MG: 10 TABLET, FILM COATED ORAL at 20:48

## 2024-10-09 RX ADMIN — SENNOSIDES 8.6 MG: 8.6 TABLET, FILM COATED ORAL at 20:48

## 2024-10-09 ASSESSMENT — PAIN DESCRIPTION - LOCATION
LOCATION: FOOT

## 2024-10-09 ASSESSMENT — PAIN SCALES - GENERAL
PAINLEVEL_OUTOF10: 6
PAINLEVEL_OUTOF10: 10
PAINLEVEL_OUTOF10: 3
PAINLEVEL_OUTOF10: 8
PAINLEVEL_OUTOF10: 10
PAINLEVEL_OUTOF10: 5
PAINLEVEL_OUTOF10: 7
PAINLEVEL_OUTOF10: 8
PAINLEVEL_OUTOF10: 0
PAINLEVEL_OUTOF10: 8

## 2024-10-09 ASSESSMENT — PAIN DESCRIPTION - ONSET
ONSET: ON-GOING

## 2024-10-09 ASSESSMENT — PAIN DESCRIPTION - PAIN TYPE
TYPE: CHRONIC PAIN

## 2024-10-09 ASSESSMENT — PAIN DESCRIPTION - DESCRIPTORS
DESCRIPTORS: ACHING;GNAWING
DESCRIPTORS: ACHING

## 2024-10-09 ASSESSMENT — PAIN DESCRIPTION - ORIENTATION
ORIENTATION: RIGHT
ORIENTATION: LEFT
ORIENTATION: RIGHT

## 2024-10-09 ASSESSMENT — PAIN DESCRIPTION - FREQUENCY
FREQUENCY: CONTINUOUS

## 2024-10-09 ASSESSMENT — PAIN - FUNCTIONAL ASSESSMENT
PAIN_FUNCTIONAL_ASSESSMENT: PREVENTS OR INTERFERES WITH ALL ACTIVE AND SOME PASSIVE ACTIVITIES
PAIN_FUNCTIONAL_ASSESSMENT: PREVENTS OR INTERFERES SOME ACTIVE ACTIVITIES AND ADLS
PAIN_FUNCTIONAL_ASSESSMENT: PREVENTS OR INTERFERES WITH ALL ACTIVE AND SOME PASSIVE ACTIVITIES

## 2024-10-09 NOTE — FLOWSHEET NOTE
10/09/24 1400   Treatment Team Notification   Reason for Communication Evaluate   Name of Team Member Notified Dr. Rothman   Treatment Team Role Attending Provider   Method of Communication Secure Message   Response See orders   Notification Time 1400     Lab called and informed writer of urinary culture results. Dr. Rothman notified.

## 2024-10-09 NOTE — PROGRESS NOTES
Daily Progress Note  Nacho Rothman MD  10/8/2024  CHIEF COMPLAINT:  Suicidal ideation    Reviewed patient's current plan of care and vital signs with nursing staff.  Sleep:  8 hours last night  Attending groups: No    SUBJECTIVE:    Patient tolerated the Abilify well and denies any side effects of the medication.  Continues to be dealing with significant anticipatory anxiety about going back to the same assisted living facility.  Reports that he has a care person from the [facility] who is assisting with placement options for him.  Reports that he is looking forward to meet them today.  Social work is assisting with this.  Has been largely isolated to her room and is only coming out for his dinner.  Has been extremely tired and sleeping most of the day.  Did mention that he sleeps mostly during the day and is up in the night.  Reports the suicidal thoughts still cross his mind.  Discussed with him about observing on the recent addition of Abilify and is agreeable to the plan.  Will consult with physical therapy today.  Mental Status Exam  Level of consciousness:  Within normal limits  Appearance: Hospital attire, seated in chair, with good grooming and hygiene   Behavior/Motor: Psychomotor retardation  Attitude toward examiner:  Cooperative, attentive, good eye contact  Speech:  spontaneous, normal rate, normal volume and well articulated  Mood: Depressed  Affect: Flat  Thought processes:  linear, goal directed and coherent  Thought content:  denies homicidal ideation  Suicidal Ideation: Passive suicidal ideation, unchanged  Delusions:  no evidence of delusions  Perceptual Disturbance:  denies any perceptual disturbance  Cognition:  Oriented to self, location, time, and situation  Memory: age appropriate  Insight & Judgement: improving  Medication side effects:  denies         Data   height is 1.956 m (6' 5\") and weight is 86.2 kg (190 lb). His oral temperature is 98.1 °F (36.7 °C). His blood pressure  (DESYREL) tablet 50 mg, 50 mg, Oral, Nightly PRN  nicotine (NICODERM CQ) 14 MG/24HR 1 patch, 1 patch, TransDERmal, Daily  gabapentin (NEURONTIN) capsule 800 mg, 800 mg, Oral, TID  oxyCODONE (OXY-IR) immediate release tablet 30 mg, 30 mg, Oral, Q8H PRN  cyclobenzaprine (FLEXERIL) tablet 10 mg, 10 mg, Oral, TID PRN  amLODIPine (NORVASC) tablet 10 mg, 10 mg, Oral, Daily  aspirin chewable tablet 81 mg, 81 mg, Oral, Daily  atorvastatin (LIPITOR) tablet 10 mg, 10 mg, Oral, Nightly  docusate sodium (COLACE) capsule 100 mg, 100 mg, Oral, BID  folic acid (FOLVITE) tablet 1 mg, 1 mg, Oral, Daily  pantoprazole (PROTONIX) tablet 40 mg, 40 mg, Oral, QAM AC  tamsulosin (FLOMAX) capsule 0.4 mg, 0.4 mg, Oral, Daily  Vitamin D (CHOLECALCIFEROL) tablet 1,000 Units, 1 tablet, Oral, Daily    ASSESSMENT  MDD (major depressive disorder), recurrent severe, without psychosis (HCC)     More than 16 minutes of the session was spent in supportive psychotherapy.  Total session lasted over 30 minutes today.  PLAN  Patient s symptoms show some improvement  Attempt to develop insight  Psycho-education conducted.  Supportive Therapy conducted.  Probable discharge is to be decided  Follow-up tbd    Electronically signed by Nacho Rothman MD on 10/8/2024 at 8:28 PM     **This report has been created using voice recognition software. It may contain minor errors which are inherent in voice recognition technology.**

## 2024-10-09 NOTE — PROGRESS NOTES
Patient reports that he still has not had a bowel movement. Refuses any prn medication, however did have colace. Fluids also encouraged. Patient states: \"I usually only go every week or so.\" No tenderness or guarding noted with palpation.. Active bowel sounds to all quadrants. Abdomen somewhat firm.

## 2024-10-09 NOTE — PROGRESS NOTES
Patient appears very restless while trying to sleep, pt calling out in discomfort and pain, moaning and groaning.

## 2024-10-09 NOTE — CONSULTS
Hospitalist Initial Consultation      Patient: Asim Back 54 y.o. male     : 1970  Admission date: 10/3/2024      ASSESSMENT AND PLAN    Active Problems  Complicated UTI  Patient on self cath regimen for the last 3-4 months secondary to urinary retention attributed to paraplegia-  follows with urology at [facility]  Continue flomax   Urine with enterobacter cloacae- sensitive to cipro. Start Cipro 500mg BID for 7 days     Uncontrolled stump pain of left foot with ulcerations  Reports amputation of left foot in late  due to osteomyelitis  \"Stubbed\" his stump 3 months ago and has had increased pain since.   Follows with Dr Cervantes locally.   on chronic oxycodone 30mg TID PRN and gabapentin 800mg TID per PDMP-   Will change oxycodone to 30mg TID scheduled along with tylenol 650mg -  alternate with ibuprofen 600mg scheduled every 8- will avoid escalating opioid therapy at this time   Podiatry initially consulted on admission- on their report he follows with Dr Ashley but non compliant with follow up- pain was felt to be due to paraplegia and no concerns for infection at that time on 10/3- Podiatry had signed off. Wound regimen of opticell, 4x4s, kerlix, ace   Check CBC, CRP, Sed Rate, XR of left stump  Constipation  Reports no BM in 1 week. Passing flatus   Start glycolax daily and senna nightly.   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 Original injury \"38 years ago\"  Continue gabapentin and flexeril  Hypertension  Hyperlipidemia  GERD  Polysubstance abuse- positive for cocaine on admission        Data Reviewed:  Labs: see chart      Thank you, Nacho Rothman,*, for the consultation.  Hospitalist service will  continue to follow along.      Electronically signed by

## 2024-10-09 NOTE — PROGRESS NOTES
[facility]  INPATIENT PHYSICAL THERAPY  EVALUATION  [facility] ADULT PSYCH 7E - [id number]    Discharge Recommendations:Discharge Recommendations: 24 hour supervision or assist  Equipment Recommendations: No               Time In: 1059  Time Out: 1120  Timed Code Treatment Minutes: 13 Minutes  Minutes: 21          Date: 10/9/2024  Patient Name: Asim Back,  Gender:  male        MRN: [id number]  : 1970  (54 y.o.)      Referring Practitioner: Nacho Rothman MD  Diagnosis: MDD (major depressive disorder), recurrent severe, without psychosis (HCC)  Additional Pertinent Hx: 54 y.o. male with significant past psychiatric history of major depressive disorder, recurrent, severe without psychosis Who presented to the ED under EMC status written by [facility] due to suicidal ideation.     Per EMC written by [facility] department, \"Asim told me several times that he wants to quit on his terms and that he should not to call anybody and told him his plans and that he should have just done it.\" Per chart review/discussion with RN - pt is an incomplete SCI from [facility] ~38 yrs ago. 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     Restrictions/Precautions:  Restrictions/Precautions: Fall Risk, Weight Bearing  Position Activity Restriction  Other position/activity restrictions: Per podiatry \"Patient is nonweightbearing at baseline due to paraplegia\" although pt does complete squat pivots    Subjective:  Chart Reviewed: Yes  Patient assessed for rehabilitation services?: Yes  Subjective: RN approved session. Pt in bed, pleasant and agreeable to therapy.    General:  Overall Orientation Status: Within Functional Limits  Vision: Within Functional Limits  Hearing: Within functional limits       Pain: 0/10: denies pain     Vitals: Vitals not assessed per clinical judgement, see nursing flowsheet    Social/Functional History:    Type of Home: Assisted

## 2024-10-09 NOTE — PROGRESS NOTES
2040: Patient requesting pain med prior to dressing change on foot due to being in a lot of pain.   2054:Pain med given, pt tolerated well.   2130:Left lower extremity wound cleansed, 4x4 gauze in place, ace wrap bandage applied. Wound had purulent drainage and a slight foul odor. Pt was calm and cooperative during dressing change.No distress noted, pt tolerated well. Pt repositioned for comfort.

## 2024-10-09 NOTE — BH NOTE
This RN has reviewed and agrees with Shoshana MISTRY LPN's data collection and has collaborated with this LPN regarding the patient's care plan.

## 2024-10-09 NOTE — PLAN OF CARE
Problem: Coping  Goal: Pt/Family able to verbalize concerns and demonstrate effective coping strategies  Description: INTERVENTIONS:  1. Assist patient/family to identify coping skills, available support systems and cultural and spiritual values  2. Provide emotional support, including active listening and acknowledgement of concerns of patient and caregivers  3. Reduce environmental stimuli, as able  4. Instruct patient/family in relaxation techniques, as appropriate  5. Assess for spiritual pain/suffering and initiate Spiritual Care, Psychosocial Clinical Specialist consults as needed  10/8/2024 2243 by Shoshana Sandoval LPN  Outcome: Not Progressing  Flowsheets (Taken 10/8/2024 1602 by Nicole Castaneda, OT)  Patient/family able to verbalize anxieties, fears, and concerns, and demonstrate effective coping:   Assist patient/family to identify coping skills, available support systems and cultural and spiritual values   Provide emotional support, including active listening and acknowledgement of concerns of patient and caregivers  Note: Pt did not attend group this evening and does not verbalize any effective coping strategies even though patient was educated and provided examples on helpful coping skills   10/8/2024 1602 by Nicole Castaneda, OT  Outcome: Not Progressing  Flowsheets (Taken 10/8/2024 1602)  Patient/family able to verbalize anxieties, fears, and concerns, and demonstrate effective coping:   Assist patient/family to identify coping skills, available support systems and cultural and spiritual values   Provide emotional support, including active listening and acknowledgement of concerns of patient and caregivers  Note: Pt has attended 0/6 group therapy sessions offered thus far today. Pt has been given maximum verbal encouragement to attend group therapy sessions, pt has been isolative to his room the majority of the day. Intermittently, pt is seen out on the unit interacting with others. Plan to continue  Progressing  Flowsheets (Taken 10/6/2024 1928 by Trena Lozano, RN)  Patient/family maintains appropriate behavior and adheres to behavioral management agreement, if implemented:   Assess patient/family’s coping skills and  non-compliant behavior (including use of illegal substances)   Encourage verbalization of thoughts and concerns in a socially appropriate manner   Utilize positive, consistent limit setting strategies supporting safety of patient, staff and others  Note: Patient is cooperative and compliant with plan of care currently.  10/8/2024 1323 by Nadege Byrne, RN  Outcome: Progressing  Flowsheets (Taken 10/6/2024 1928 by Trena Lozano, RN)  Patient/family maintains appropriate behavior and adheres to behavioral management agreement, if implemented:   Assess patient/family’s coping skills and  non-compliant behavior (including use of illegal substances)   Encourage verbalization of thoughts and concerns in a socially appropriate manner   Utilize positive, consistent limit setting strategies supporting safety of patient, staff and others  Note: Patient is cooperative and compliant with plan of care currently.     Problem: Involuntary Admit  Goal: Will cooperate with staff recommendations and doctor's orders and will demonstrate appropriate behavior  Description: INTERVENTIONS:  1. Treat underlying conditions and offer medication as ordered  2. Educate regarding involuntary admission procedures and rules  3. Contain excessive/inappropriate behavior per unit and hospital policies  10/8/2024 2243 by Shoshana Sandoval LPN  Outcome: Progressing  Flowsheets (Taken 10/8/2024 1323 by Nadege Byrne, RN)  Will cooperate with staff recommendations and doctor's orders and will demonstrate appropriate behavior:   Educate regarding involuntary admission procedures and rules   Contain excessive/inappropriate behavior per unit and hospital policies  Note:   Patient is appropriate at this time.    10/8/2024 1323 by Chavez

## 2024-10-09 NOTE — PLAN OF CARE
Problem: Pain  Goal: Verbalizes/displays adequate comfort level or baseline comfort level  Outcome: Not Progressing  Flowsheets (Taken 10/8/2024 2216 by Shoshana Sandoval LPN)  Verbalizes/displays adequate comfort level or baseline comfort level:   Encourage patient to monitor pain and request assistance   Assess pain using appropriate pain scale   Administer analgesics based on type and severity of pain and evaluate response  Note: Chronic pain noted, refer to flow sheets.     Problem: Risk for Elopement  Goal: Patient will not exit the unit/facility without proper excort  Outcome: Progressing  Flowsheets (Taken 10/9/2024 0800)  Nursing Interventions for Elopement Risk:   Reduce environmental triggers   Make sure patient has all necessary personal care items  Note: No attempts at elopement noted so far this shift.      Problem: Safety - Adult  Goal: Free from fall injury  Outcome: Progressing  Flowsheets (Taken 10/5/2024 0040)  Free From Fall Injury: Instruct family/caregiver on patient safety  Note: No falls noted.      Problem: Skin/Tissue Integrity  Goal: Absence of new skin breakdown  Description: 1.  Monitor for areas of redness and/or skin breakdown  2.  Assess vascular access sites hourly  3.  Every 4-6 hours minimum:  Change oxygen saturation probe site  4.  Every 4-6 hours:  If on nasal continuous positive airway pressure, respiratory therapy assess nares and determine need for appliance change or resting period.  Outcome: Progressing  Note: No new breakdown noted, wounds are being treated.      Problem: Anxiety  Goal: Will report anxiety at manageable levels  Description: INTERVENTIONS:  1. Administer medication as ordered  2. Teach and rehearse alternative coping skills  3. Provide emotional support with 1:1 interaction with staff  Outcome: Progressing  Flowsheets (Taken 10/8/2024 2220 by Shoshana Sandoval LPN)  Will report anxiety at manageable levels:   Administer medication as ordered   Teach and

## 2024-10-10 ENCOUNTER — APPOINTMENT (OUTPATIENT)
Dept: INTERVENTIONAL RADIOLOGY/VASCULAR | Age: 54
DRG: 885 | End: 2024-10-10
Payer: MEDICARE

## 2024-10-10 ENCOUNTER — APPOINTMENT (OUTPATIENT)
Dept: MRI IMAGING | Age: 54
DRG: 885 | End: 2024-10-10
Payer: MEDICARE

## 2024-10-10 LAB — ECHO BSA: 2.16 M2

## 2024-10-10 PROCEDURE — 90833 PSYTX W PT W E/M 30 MIN: CPT | Performed by: PSYCHIATRY & NEUROLOGY

## 2024-10-10 PROCEDURE — 87040 BLOOD CULTURE FOR BACTERIA: CPT

## 2024-10-10 PROCEDURE — 99232 SBSQ HOSP IP/OBS MODERATE 35: CPT | Performed by: PSYCHIATRY & NEUROLOGY

## 2024-10-10 PROCEDURE — 6370000000 HC RX 637 (ALT 250 FOR IP)

## 2024-10-10 PROCEDURE — 6370000000 HC RX 637 (ALT 250 FOR IP): Performed by: PHYSICIAN ASSISTANT

## 2024-10-10 PROCEDURE — 1240000000 HC EMOTIONAL WELLNESS R&B

## 2024-10-10 PROCEDURE — 93926 LOWER EXTREMITY STUDY: CPT

## 2024-10-10 PROCEDURE — 99232 SBSQ HOSP IP/OBS MODERATE 35: CPT | Performed by: PHYSICIAN ASSISTANT

## 2024-10-10 PROCEDURE — 73718 MRI LOWER EXTREMITY W/O DYE: CPT

## 2024-10-10 PROCEDURE — 6370000000 HC RX 637 (ALT 250 FOR IP): Performed by: PSYCHIATRY & NEUROLOGY

## 2024-10-10 PROCEDURE — 36415 COLL VENOUS BLD VENIPUNCTURE: CPT

## 2024-10-10 RX ORDER — ARIPIPRAZOLE 5 MG/1
5 TABLET ORAL NIGHTLY
Status: DISCONTINUED | OUTPATIENT
Start: 2024-10-10 | End: 2024-10-14 | Stop reason: HOSPADM

## 2024-10-10 RX ADMIN — IBUPROFEN 800 MG: 800 TABLET, FILM COATED ORAL at 01:54

## 2024-10-10 RX ADMIN — ASPIRIN 81 MG 81 MG: 81 TABLET ORAL at 08:05

## 2024-10-10 RX ADMIN — PANTOPRAZOLE SODIUM 40 MG: 40 TABLET, DELAYED RELEASE ORAL at 08:05

## 2024-10-10 RX ADMIN — DULOXETINE HYDROCHLORIDE 30 MG: 30 CAPSULE, DELAYED RELEASE ORAL at 07:59

## 2024-10-10 RX ADMIN — ARIPIPRAZOLE 5 MG: 5 TABLET ORAL at 21:09

## 2024-10-10 RX ADMIN — OXYCODONE HYDROCHLORIDE 30 MG: 15 TABLET ORAL at 05:55

## 2024-10-10 RX ADMIN — GABAPENTIN 800 MG: 400 CAPSULE ORAL at 07:58

## 2024-10-10 RX ADMIN — DOCUSATE SODIUM 100 MG: 100 CAPSULE, LIQUID FILLED ORAL at 08:05

## 2024-10-10 RX ADMIN — DULOXETINE HYDROCHLORIDE 30 MG: 30 CAPSULE, DELAYED RELEASE ORAL at 21:10

## 2024-10-10 RX ADMIN — Medication 1000 UNITS: at 08:01

## 2024-10-10 RX ADMIN — GABAPENTIN 800 MG: 400 CAPSULE ORAL at 21:10

## 2024-10-10 RX ADMIN — ACETAMINOPHEN 650 MG: 325 TABLET ORAL at 05:54

## 2024-10-10 RX ADMIN — FOLIC ACID 1 MG: 1 TABLET ORAL at 08:00

## 2024-10-10 RX ADMIN — CIPROFLOXACIN HYDROCHLORIDE 500 MG: 500 TABLET, FILM COATED ORAL at 21:09

## 2024-10-10 RX ADMIN — DOCUSATE SODIUM 100 MG: 100 CAPSULE, LIQUID FILLED ORAL at 21:10

## 2024-10-10 RX ADMIN — AMLODIPINE BESYLATE 10 MG: 10 TABLET ORAL at 07:56

## 2024-10-10 RX ADMIN — OXYCODONE HYDROCHLORIDE 30 MG: 15 TABLET ORAL at 21:09

## 2024-10-10 RX ADMIN — DULOXETINE HYDROCHLORIDE 30 MG: 30 CAPSULE, DELAYED RELEASE ORAL at 12:40

## 2024-10-10 RX ADMIN — OXYCODONE HYDROCHLORIDE 30 MG: 15 TABLET ORAL at 12:39

## 2024-10-10 RX ADMIN — SENNOSIDES 8.6 MG: 8.6 TABLET, FILM COATED ORAL at 21:10

## 2024-10-10 RX ADMIN — POLYETHYLENE GLYCOL 3350 17 G: 17 POWDER, FOR SOLUTION ORAL at 08:03

## 2024-10-10 RX ADMIN — IBUPROFEN 800 MG: 800 TABLET, FILM COATED ORAL at 10:16

## 2024-10-10 RX ADMIN — CIPROFLOXACIN HYDROCHLORIDE 500 MG: 500 TABLET, FILM COATED ORAL at 07:58

## 2024-10-10 RX ADMIN — TAMSULOSIN HYDROCHLORIDE 0.4 MG: 0.4 CAPSULE ORAL at 07:59

## 2024-10-10 RX ADMIN — GABAPENTIN 800 MG: 400 CAPSULE ORAL at 12:39

## 2024-10-10 RX ADMIN — ATORVASTATIN CALCIUM 10 MG: 10 TABLET, FILM COATED ORAL at 21:10

## 2024-10-10 RX ADMIN — ACETAMINOPHEN 650 MG: 325 TABLET ORAL at 12:39

## 2024-10-10 ASSESSMENT — PAIN SCALES - GENERAL
PAINLEVEL_OUTOF10: 0
PAINLEVEL_OUTOF10: 0
PAINLEVEL_OUTOF10: 10
PAINLEVEL_OUTOF10: 8
PAINLEVEL_OUTOF10: 0
PAINLEVEL_OUTOF10: 7
PAINLEVEL_OUTOF10: 0
PAINLEVEL_OUTOF10: 0
PAINLEVEL_OUTOF10: 6
PAINLEVEL_OUTOF10: 0

## 2024-10-10 ASSESSMENT — PAIN DESCRIPTION - FREQUENCY: FREQUENCY: CONTINUOUS

## 2024-10-10 ASSESSMENT — PAIN DESCRIPTION - ONSET: ONSET: ON-GOING

## 2024-10-10 ASSESSMENT — PAIN DESCRIPTION - LOCATION
LOCATION: FOOT
LOCATION: BACK
LOCATION: FOOT
LOCATION: FOOT

## 2024-10-10 ASSESSMENT — PAIN DESCRIPTION - DESCRIPTORS
DESCRIPTORS: ACHING;DISCOMFORT
DESCRIPTORS: ACHING;DISCOMFORT
DESCRIPTORS: ACHING
DESCRIPTORS: ACHING

## 2024-10-10 ASSESSMENT — PAIN - FUNCTIONAL ASSESSMENT
PAIN_FUNCTIONAL_ASSESSMENT: PREVENTS OR INTERFERES WITH ALL ACTIVE AND SOME PASSIVE ACTIVITIES
PAIN_FUNCTIONAL_ASSESSMENT: ACTIVITIES ARE NOT PREVENTED
PAIN_FUNCTIONAL_ASSESSMENT: ACTIVITIES ARE NOT PREVENTED
PAIN_FUNCTIONAL_ASSESSMENT: PREVENTS OR INTERFERES WITH ALL ACTIVE AND SOME PASSIVE ACTIVITIES

## 2024-10-10 ASSESSMENT — PAIN DESCRIPTION - ORIENTATION
ORIENTATION: RIGHT
ORIENTATION: MID
ORIENTATION: LEFT
ORIENTATION: RIGHT

## 2024-10-10 ASSESSMENT — PAIN DESCRIPTION - PAIN TYPE: TYPE: CHRONIC PAIN

## 2024-10-10 NOTE — BH NOTE
Patient left unit for MRI at 1248 and returned to unit 1425. Patient transferred to bed with minimal assist. Patient eating lunch tray now.

## 2024-10-10 NOTE — BH NOTE
Pt is a 54 y.o male admitted due to major depressive disorder. Pt presented to ED under EMC status written by [facility] department due to suicidal ideation. Pt has a partial amputee. Did not attend any groups. Was not able to ask the patient about current suicidal thoughts.

## 2024-10-10 NOTE — PROGRESS NOTES
Behavioral Services                                              Medicare Re-Certification    I certify that the inpatient psychiatric hospital services furnished since the previous certification/re-certification were, and continue to be, medically necessary for;    [x] (1) Treatment which could reasonably be expected to improve the patient's condition,    [x] (2) Or for diagnostic study.    Estimated length of stay/service 3-5 days    Plan for post-hospital care [facility]    This patient continues to need, on a daily basis, active treatment furnished directly by or requiring the supervision of inpatient psychiatric personnel.    Electronically signed by Nacho Rothman MD on 10/10/2024 at 5:30 PM

## 2024-10-10 NOTE — PATIENT CARE CONFERENCE
Behavioral Health  Day 7/Weekly Interdisciplinary Treatment Plan NOTE    Patient was in treatment team    ADMISSION TYPE:   Admission Type: Involuntary    REASON FOR ADMISSION:  Reason for Admission: \"I'm very depressed and I want to take myself out on my own terms\"  Estimated Length of Stay Update:  2-3 days  Estimated Discharge Date Update: 10/17/24    Patient Strengths/Barriers  Strengths (Must Choose Two): Previous positive response to treatment, Support from family  Barriers: Support of organized community  Addictive Behavior:Addictive Behavior  In the Past 3 Months, Have You Felt or Has Someone Told You That You Have a Problem With  : None  Medical Problems:  Past Medical History:   Diagnosis Date    Hypertension        RISK:  Fall Risk   Rock Scale Rock Scale Score: 16    STATUS EXAM:   Mental Status and Behavioral Exam  Normal: No  Level of Assistance: Independent/Self  Facial Expression: Flat  Affect: Blunt  Level of Consciousness: Alert  Frequency of Checks: 1 staff: 1 patient  - continuous  Mood:Normal: No  Mood: Depressed, Anxious  Motor Activity:Normal: No  Motor Activity: Decreased  Eye Contact: Fair  Observed Behavior: Cooperative  Sexual Misconduct History: Current - no  Involved In Any Sexual Misconduct With Others? : Unable to access  History of Sexually Inappropriate Behavior When Previously Hospitalized?: Unable to access  Uncontrollable/Compulsive Masturbation?: No  Difficulty Controlling Sexual Impulses?: No  Preception: [place] to person, [place] to time, [place] to place, [place] to situation  Attention:Normal: No  Attention: Distractible  Thought Processes: Unremarkable  Thought Content:Normal: Yes  Thought Content: Paranoia  Depression Symptoms: Change in energy level, Loss of interest, Isolative  Anxiety Symptoms: Generalized  Shanell Symptoms: No problems reported or observed.  Hallucinations: None  Delusions: No  Delusions: Paranoid  Memory:Normal: No  Memory: Poor recent  Insight and

## 2024-10-10 NOTE — PROGRESS NOTES
Case management- Call from Mallory with the [facility] [employer] [facility] [employer], . Maria Alejandra asks if she is able to come visit patient on the unit to assist him with filling out applications for other housing. Mallory states she will come to the unit today, Thursday 10/10 at 3:00pm. OTONIEL Mejía updated.

## 2024-10-10 NOTE — BH NOTE
Patient left unit via bed with transport and this writer to vascular at 1535. Patient returned to unit at 1610

## 2024-10-10 NOTE — PLAN OF CARE
Problem: Pain  Goal: Verbalizes/displays adequate comfort level or baseline comfort level  10/9/2024 2229 by Jeremie Martinez, RN  Outcome: Progressing  Flowsheets (Taken 10/9/2024 2229)  Verbalizes/displays adequate comfort level or baseline comfort level: Assess pain using appropriate pain scale  Note: Pt c/o right foot pain, pt had flexeril and is getting Tylenol, Motrin and Oxycodone as scheduled  10/9/2024 1651 by Nadege Byrne, RN  Outcome: Not Progressing  Flowsheets (Taken 10/8/2024 2216 by Shoshana Sandoval LPN)  Verbalizes/displays adequate comfort level or baseline comfort level:   Encourage patient to monitor pain and request assistance   Assess pain using appropriate pain scale   Administer analgesics based on type and severity of pain and evaluate response  Note: Chronic pain noted, refer to flow sheets.     Problem: Risk for Elopement  Goal: Patient will not exit the unit/facility without proper excort  10/9/2024 2229 by Jeremie Martinez, RN  Note: Pt has had not exit seeking behaviors this evening  10/9/2024 1651 by Nadege Byrne, RN  Outcome: Progressing  Flowsheets (Taken 10/9/2024 0800)  Nursing Interventions for Elopement Risk:   Reduce environmental triggers   Make sure patient has all necessary personal care items  Note: No attempts at elopement noted so far this shift.      Problem: Safety - Adult  Goal: Free from fall injury  10/9/2024 2229 by Jeremie Martinez, RN  Outcome: Progressing  Note: Pt is free from falls this evening  10/9/2024 1651 by Nadege Byrne, RN  Outcome: Progressing  Flowsheets (Taken 10/5/2024 0040)  Free From Fall Injury: Instruct family/caregiver on patient safety  Note: No falls noted.      Problem: Anxiety  Goal: Will report anxiety at manageable levels  Description: INTERVENTIONS:  1. Administer medication as ordered  2. Teach and rehearse alternative coping skills  3. Provide emotional support with 1:1 interaction with staff  10/9/2024 2229 by Michelle  excessive/inappropriate behavior per unit and hospital policies  10/9/2024 2229 by Jeremie Martinez, RN  Outcome: Progressing  Flowsheets (Taken 10/9/2024 2229)  Will cooperate with staff recommendations and doctor's orders and will demonstrate appropriate behavior: Treat underlying conditions and offer medication as ordered  10/9/2024 1651 by Nadege Byrne, RN  Outcome: Progressing  Flowsheets (Taken 10/8/2024 1323)  Will cooperate with staff recommendations and doctor's orders and will demonstrate appropriate behavior:   Educate regarding involuntary admission procedures and rules   Contain excessive/inappropriate behavior per unit and hospital policies  Note: Patient cooperative and compliant with plan of care.     Problem: Discharge Planning  Goal: Discharge to home or other facility with appropriate resources  10/9/2024 2229 by Jeremie Martinez RN  Outcome: Progressing  Flowsheets (Taken 10/9/2024 2229)  Discharge to home or other facility with appropriate resources: Identify barriers to discharge with patient and caregiver  10/9/2024 1651 by Nadege Byrne RN  Outcome: Progressing  Flowsheets (Taken 10/7/2024 1206 by Rosalia Agarwal RN)  Discharge to home or other facility with appropriate resources:   Identify barriers to discharge with patient and caregiver   Refer to discharge planning if patient needs post-hospital services based on physician order or complex needs related to functional status, cognitive ability or social support system  Note: Discharge planning is in progress.      Problem: Musculoskeletal - Adult  Goal: Return mobility to safest level of function  10/9/2024 2229 by Jeremie Martinez, RN  Outcome: Progressing  Flowsheets (Taken 10/9/2024 2229)  Return Mobility to Safest Level of Function: Ensure adequate protection for wounds/incisions during mobilization  10/9/2024 1651 by Nadege Byrne RN  Outcome: Progressing  Flowsheets (Taken 10/9/2024 1651)  Return Mobility to Safest

## 2024-10-10 NOTE — PROGRESS NOTES
Pt dressing's changed to right foot and left foot amputation wound changed. See flowsheets. Pt refused to put his ace wrap back on his left foot amputation states \"it needs to breath and that will suffocate it\". Nurse only applied Kerlix over top of opticell and 4x4 gauze per patient request.

## 2024-10-10 NOTE — PROGRESS NOTES
mg, 500 mg, Oral, 2 times per day  oxyCODONE (OXY-IR) immediate release tablet 30 mg, 30 mg, Oral, 3 times per day  acetaminophen (TYLENOL) tablet 650 mg, 650 mg, Oral, Q8H  ibuprofen (ADVIL;MOTRIN) tablet 800 mg, 800 mg, Oral, 3 times per day  polyethylene glycol (GLYCOLAX) packet 17 g, 17 g, Oral, Daily  senna (SENOKOT) tablet 8.6 mg, 1 tablet, Oral, Nightly  ARIPiprazole (ABILIFY) tablet 2 mg, 2 mg, Oral, Nightly  DULoxetine (CYMBALTA) extended release capsule 30 mg, 30 mg, Oral, TID  acetaminophen (TYLENOL) tablet 650 mg, 650 mg, Oral, Q4H PRN  magnesium hydroxide (MILK OF MAGNESIA) 400 MG/5ML suspension 30 mL, 30 mL, Oral, Daily PRN  aluminum & magnesium hydroxide-simethicone (MAALOX) 200-200-20 MG/5ML suspension 30 mL, 30 mL, Oral, Q6H PRN  hydrOXYzine HCl (ATARAX) tablet 50 mg, 50 mg, Oral, TID PRN  traZODone (DESYREL) tablet 50 mg, 50 mg, Oral, Nightly PRN  nicotine (NICODERM CQ) 14 MG/24HR 1 patch, 1 patch, TransDERmal, Daily  gabapentin (NEURONTIN) capsule 800 mg, 800 mg, Oral, TID  cyclobenzaprine (FLEXERIL) tablet 10 mg, 10 mg, Oral, TID PRN  amLODIPine (NORVASC) tablet 10 mg, 10 mg, Oral, Daily  aspirin chewable tablet 81 mg, 81 mg, Oral, Daily  atorvastatin (LIPITOR) tablet 10 mg, 10 mg, Oral, Nightly  docusate sodium (COLACE) capsule 100 mg, 100 mg, Oral, BID  folic acid (FOLVITE) tablet 1 mg, 1 mg, Oral, Daily  pantoprazole (PROTONIX) tablet 40 mg, 40 mg, Oral, QAM AC  tamsulosin (FLOMAX) capsule 0.4 mg, 0.4 mg, Oral, Daily  Vitamin D (CHOLECALCIFEROL) tablet 1,000 Units, 1 tablet, Oral, Daily    ASSESSMENT  MDD (major depressive disorder), recurrent severe, without psychosis (HCC)     PLAN  Patient's symptoms are showing some improvement  Attempt to develop insight  Psycho-education conducted.  Supportive Therapy conducted.  Probable discharge is TBD, possibly on Monday  Follow-up TBD    Electronically signed by Jayy Aguillon DO on 10/10/24 at 10:08 AM EDT    **This report has been created  using voice recognition software. It may contain minor errors which are inherent in voice recognition technology.**                                         Psychiatry Attending Attestation     I independently saw and evaluated the patient.  I reviewed the Resident Physician's documentation above.  Any additional comments or changes to the Resident Physician's documentation are stated below otherwise agree with assessment.    Patient continues to state that his depression is largely unchanged.  Reports feeling helpless and hopeless.  Reports that suicidal thoughts continue to bother him.  Reports that he has a feeling that he will \"die\".  Internal medicine is assisting in addressing his UTI at this time.  Still unable to contract for safety outside of hospital.  Discussed with him about continue to titrate Abilify to help with his mood and he is agreeable to the plan.  Possible discharge by Monday if he shows improvement over the weekend.     ASSESSMENT  MDD (major depressive disorder), recurrent severe, without psychosis (HCC)    More than 16 mins of the session was spent doing Supportive psychotherapy and coordinating care. Session lasted for over 30 mins.    PLAN  Patient's symptoms show no change  Medications risks, benefits and alternatives were discussed with the patient  Attempt to develop insight  Psycho-education conducted.  Supportive Therapy conducted.  Probable discharge is tbd  Follow-up TBD    Electronically signed by Nacho Rothman MD on 10/10/24 at 5:29 PM EDT

## 2024-10-10 NOTE — PLAN OF CARE
delusions  Description: INTERVENTIONS:  1. Administer medication as  ordered  2. Assist with reality testing to support increasing orientation  3. Assess if patient's hallucinations or delusions are encouraging self harm or harm to others and intervene as appropriate  10/10/2024 1057 by Rain Naranjo RN  Outcome: Progressing  Note: Patient reports no hallucinations.       Problem: Depression  Goal: Will be euthymic at discharge  Description: INTERVENTIONS:  1. Administer medication as ordered  2. Provide emotional support via 1:1 interaction with staff  3. Encourage involvement in milieu/groups/activities  4. Monitor for social isolation  10/10/2024 1057 by Rain Naranjo RN  Outcome: Progressing  Note: Patient reports mood 7/10 with 10 being normal. Has flat affect. Speech clear. Fair eye contact. Reports hope for future and identifies no one as their support system.  Patient reports depression at present time.       Problem: Chronic Conditions and Co-morbidities  Goal: Patient's chronic conditions and co-morbidity symptoms are monitored and maintained or improved  10/10/2024 1057 by Rain Naranjo RN  Outcome: Progressing  Flowsheets (Taken 10/10/2024 0853)  Care Plan - Patient's Chronic Conditions and Co-Morbidity Symptoms are Monitored and Maintained or Improved: Monitor and assess patient's chronic conditions and comorbid symptoms for stability, deterioration, or improvement   Care plan reviewed with patient.  Patient does verbalize understanding of the plan of care and does contribute to goal setting
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
## 2024-10-10 NOTE — PROGRESS NOTES
Daily Progress Note  Nacho Rothman MD  10/9/2024  CHIEF COMPLAINT:  Suicidal ideation    Reviewed patient's current plan of care and vital signs with nursing staff.  Sleep:  8 hours last night  Attending groups: No    SUBJECTIVE:    Patient continues to remain largely isolated to the room.  Currently dealing with a UTI and we consulted with internal medicine to assist with this.  Reports that his mood is largely unchanged and dealing with negative thoughts of suicide.  Reports that he continues to feel helpless and hopeless about his situation.  Some trouble falling asleep and staying asleep.  However patient has been sleeping for most of the day.  Discussed with him that we will observe and continue to titrate Abilify and is agreeable to the plan.  He had case management services from the state to assess him for a new assisted living facility.  Reports that he will have to return back to the same assisted living place and will need to were with them to find a new facility after he gets there.  Patient is agreeable to this plan.    Mental Status Exam  Level of consciousness:  Within normal limits  Appearance: Hospital attire, seated in chair, with good grooming and hygiene   Behavior/Motor: Psychomotor retardation  Attitude toward examiner:  Cooperative, attentive, good eye contact  Speech:  spontaneous, normal rate, normal volume and well articulated  Mood: Depressed  Affect: withdrawn  Thought processes:  linear, goal directed and coherent  Thought content:  denies homicidal ideation  Suicidal Ideation: Passive suicidal ideation, unchanged  Delusions:  no evidence of delusions  Perceptual Disturbance:  denies any perceptual disturbance  Cognition:  Oriented to self, location, time, and situation  Memory: age appropriate  Insight & Judgement: fair  Medication side effects:  denies         Data   height is 1.956 m (6' 5\") and weight is 86.2 kg (190 lb). His oral temperature is 97.7 °F (36.5 °C). His blood  ng/ml            Benzodiazapine                   200 ng/ml            Cannabinoids                      50 ng/ml            Cocaine Metabolite               300 ng/ml            Opiates                          300 ng/ml            Oxycodone                        100 ng/ml            Phencyclidine                     25 ng/ml            Fentanyl                           5 ng/ml  A \"Positive\" result for a drug abuse screen test should be     considered presumptive positive until/unless confirmed     by another method.  (Additional request)  Quantitative values from a reference laboratory are     available upon additional request.  These results are for medical use only.  [place] [address]      Anion Gap 10/04/2024 14.0  8.0 - 16.0 meq/L Final    Comment: ANION GAP = Sodium -(Chloride + CO2)  [place] [address]      Osmolality Calc 10/04/2024 272.9 (L)  275.0 - 300.0 mOsmol/kg Final    [place] [address]    Est, Glom Filt Rate 10/04/2024 > 90  >60 ml/min/1.73m2 Final    Comment: Pediatric calculator link  https://www.kidney.org/professionals/kdoqi/gfr_calculatorped  Effective Oct 3, 2022  These results are not intended for use in patients  <18 years of age.  eGFR results are calculated without a race factor  using the 2021 CKD-EPI equation.  Careful clinical  correlation is recommended, particularly when comparing  to results calculated using previous equations. The  CKD-EPI equation is less accurate in patients with  extremes of muscle mass, extra-renal metabolism of  creatinine, excessive creatine ingestion, or following  therapy that affects renal tubular secretion.  [place] [address]      Glucose, Ur 10/07/2024 NEGATIVE  NEGATIVE mg/dl Final    Bilirubin, Urine 10/07/2024 NEGATIVE  NEGATIVE Final    Ketones, Urine

## 2024-10-11 PROBLEM — R45.851 SUICIDAL IDEATION: Status: ACTIVE | Noted: 2024-10-11

## 2024-10-11 PROBLEM — S98.912A: Status: ACTIVE | Noted: 2024-10-11

## 2024-10-11 PROBLEM — M86.60 CHRONIC OSTEOMYELITIS: Status: ACTIVE | Noted: 2024-10-11

## 2024-10-11 PROBLEM — Z16.24 MULTIPLE DRUG RESISTANT ORGANISM (MDRO) CULTURE POSITIVE: Status: ACTIVE | Noted: 2024-10-11

## 2024-10-11 PROBLEM — M79.672 FOOT PAIN, LEFT: Status: ACTIVE | Noted: 2024-10-11

## 2024-10-11 PROCEDURE — 6370000000 HC RX 637 (ALT 250 FOR IP): Performed by: PHYSICIAN ASSISTANT

## 2024-10-11 PROCEDURE — 6370000000 HC RX 637 (ALT 250 FOR IP)

## 2024-10-11 PROCEDURE — 99232 SBSQ HOSP IP/OBS MODERATE 35: CPT | Performed by: STUDENT IN AN ORGANIZED HEALTH CARE EDUCATION/TRAINING PROGRAM

## 2024-10-11 PROCEDURE — 99223 1ST HOSP IP/OBS HIGH 75: CPT | Performed by: STUDENT IN AN ORGANIZED HEALTH CARE EDUCATION/TRAINING PROGRAM

## 2024-10-11 PROCEDURE — 99232 SBSQ HOSP IP/OBS MODERATE 35: CPT | Performed by: PSYCHIATRY & NEUROLOGY

## 2024-10-11 PROCEDURE — 6370000000 HC RX 637 (ALT 250 FOR IP): Performed by: PSYCHIATRY & NEUROLOGY

## 2024-10-11 PROCEDURE — 6370000000 HC RX 637 (ALT 250 FOR IP): Performed by: STUDENT IN AN ORGANIZED HEALTH CARE EDUCATION/TRAINING PROGRAM

## 2024-10-11 PROCEDURE — 90833 PSYTX W PT W E/M 30 MIN: CPT | Performed by: PSYCHIATRY & NEUROLOGY

## 2024-10-11 PROCEDURE — 1240000000 HC EMOTIONAL WELLNESS R&B

## 2024-10-11 RX ORDER — SENNOSIDES A AND B 8.6 MG/1
1 TABLET, FILM COATED ORAL 2 TIMES DAILY
Status: DISCONTINUED | OUTPATIENT
Start: 2024-10-11 | End: 2024-10-14 | Stop reason: HOSPADM

## 2024-10-11 RX ADMIN — ATORVASTATIN CALCIUM 10 MG: 10 TABLET, FILM COATED ORAL at 22:00

## 2024-10-11 RX ADMIN — DOCUSATE SODIUM 100 MG: 100 CAPSULE, LIQUID FILLED ORAL at 08:46

## 2024-10-11 RX ADMIN — CYCLOBENZAPRINE 10 MG: 10 TABLET, FILM COATED ORAL at 10:19

## 2024-10-11 RX ADMIN — OXYCODONE HYDROCHLORIDE 30 MG: 15 TABLET ORAL at 21:59

## 2024-10-11 RX ADMIN — OXYCODONE HYDROCHLORIDE 30 MG: 15 TABLET ORAL at 06:25

## 2024-10-11 RX ADMIN — DULOXETINE HYDROCHLORIDE 30 MG: 30 CAPSULE, DELAYED RELEASE ORAL at 08:46

## 2024-10-11 RX ADMIN — TAMSULOSIN HYDROCHLORIDE 0.4 MG: 0.4 CAPSULE ORAL at 08:46

## 2024-10-11 RX ADMIN — ASPIRIN 81 MG 81 MG: 81 TABLET ORAL at 08:46

## 2024-10-11 RX ADMIN — ARIPIPRAZOLE 5 MG: 5 TABLET ORAL at 21:54

## 2024-10-11 RX ADMIN — PANTOPRAZOLE SODIUM 40 MG: 40 TABLET, DELAYED RELEASE ORAL at 08:46

## 2024-10-11 RX ADMIN — GABAPENTIN 800 MG: 400 CAPSULE ORAL at 08:46

## 2024-10-11 RX ADMIN — POLYETHYLENE GLYCOL 3350 17 G: 17 POWDER, FOR SOLUTION ORAL at 08:46

## 2024-10-11 RX ADMIN — CIPROFLOXACIN HYDROCHLORIDE 500 MG: 500 TABLET, FILM COATED ORAL at 08:46

## 2024-10-11 RX ADMIN — OXYCODONE HYDROCHLORIDE 30 MG: 15 TABLET ORAL at 13:47

## 2024-10-11 RX ADMIN — Medication 1000 UNITS: at 08:46

## 2024-10-11 RX ADMIN — GABAPENTIN 800 MG: 400 CAPSULE ORAL at 21:54

## 2024-10-11 RX ADMIN — IBUPROFEN 800 MG: 800 TABLET, FILM COATED ORAL at 21:58

## 2024-10-11 RX ADMIN — AMLODIPINE BESYLATE 10 MG: 10 TABLET ORAL at 08:46

## 2024-10-11 RX ADMIN — CIPROFLOXACIN HYDROCHLORIDE 500 MG: 500 TABLET, FILM COATED ORAL at 21:54

## 2024-10-11 RX ADMIN — DOCUSATE SODIUM 100 MG: 100 CAPSULE, LIQUID FILLED ORAL at 22:00

## 2024-10-11 RX ADMIN — IBUPROFEN 800 MG: 800 TABLET, FILM COATED ORAL at 03:15

## 2024-10-11 RX ADMIN — SENNOSIDES 8.6 MG: 8.6 TABLET, FILM COATED ORAL at 21:58

## 2024-10-11 RX ADMIN — ACETAMINOPHEN 650 MG: 325 TABLET ORAL at 13:47

## 2024-10-11 RX ADMIN — DULOXETINE HYDROCHLORIDE 30 MG: 30 CAPSULE, DELAYED RELEASE ORAL at 21:58

## 2024-10-11 RX ADMIN — GABAPENTIN 800 MG: 400 CAPSULE ORAL at 13:47

## 2024-10-11 RX ADMIN — IBUPROFEN 800 MG: 800 TABLET, FILM COATED ORAL at 09:02

## 2024-10-11 RX ADMIN — FOLIC ACID 1 MG: 1 TABLET ORAL at 08:46

## 2024-10-11 RX ADMIN — ACETAMINOPHEN 650 MG: 325 TABLET ORAL at 21:55

## 2024-10-11 RX ADMIN — ACETAMINOPHEN 650 MG: 325 TABLET ORAL at 06:25

## 2024-10-11 ASSESSMENT — PAIN DESCRIPTION - DESCRIPTORS
DESCRIPTORS: SPASM
DESCRIPTORS: ACHING;JABBING
DESCRIPTORS: ACHING;DISCOMFORT
DESCRIPTORS: ACHING;DISCOMFORT
DESCRIPTORS: ACHING;JABBING
DESCRIPTORS: ACHING;JABBING
DESCRIPTORS: ACHING

## 2024-10-11 ASSESSMENT — PAIN - FUNCTIONAL ASSESSMENT

## 2024-10-11 ASSESSMENT — PAIN SCALES - GENERAL
PAINLEVEL_OUTOF10: 10
PAINLEVEL_OUTOF10: 6
PAINLEVEL_OUTOF10: 8
PAINLEVEL_OUTOF10: 4
PAINLEVEL_OUTOF10: 8
PAINLEVEL_OUTOF10: 10

## 2024-10-11 ASSESSMENT — PAIN SCALES - WONG BAKER
WONGBAKER_NUMERICALRESPONSE: HURTS A LITTLE BIT
WONGBAKER_NUMERICALRESPONSE: HURTS WORST

## 2024-10-11 ASSESSMENT — PAIN DESCRIPTION - ORIENTATION
ORIENTATION: MID
ORIENTATION: LEFT
ORIENTATION: OTHER (COMMENT)
ORIENTATION: LOWER
ORIENTATION: LEFT
ORIENTATION: LEFT
ORIENTATION: LOWER
ORIENTATION: RIGHT;LEFT

## 2024-10-11 ASSESSMENT — PAIN DESCRIPTION - LOCATION
LOCATION: OTHER (COMMENT)
LOCATION: BACK
LOCATION: LEG
LOCATION: FOOT
LOCATION: FOOT
LOCATION: LEG

## 2024-10-11 ASSESSMENT — PAIN DESCRIPTION - ONSET
ONSET: ON-GOING
ONSET: AWAKENED FROM SLEEP

## 2024-10-11 ASSESSMENT — PAIN DESCRIPTION - FREQUENCY
FREQUENCY: CONTINUOUS
FREQUENCY: CONTINUOUS

## 2024-10-11 ASSESSMENT — PAIN DESCRIPTION - PAIN TYPE
TYPE: CHRONIC PAIN
TYPE: ACUTE PAIN;CHRONIC PAIN

## 2024-10-11 NOTE — PLAN OF CARE
Problem: Pain  Goal: Verbalizes/displays adequate comfort level or baseline comfort level  10/11/2024 0053 by Bita Goss RN  Outcome: Progressing  Note: Patient reports feet and back pain 7 out of 10. Patient taking scheduled medications for pain.  Patient reports relief from pain after taking pain medications.   Pain goal of 0 was met, patient refused Tylenol stating he did not need to take that many pain medications.  Non-pharmacological interventions offered were rest. Patient satisfied.     10/10/2024 1057 by Rain Naranjo RN  Outcome: Progressing  Flowsheets (Taken 10/10/2024 0750)  Verbalizes/displays adequate comfort level or baseline comfort level: Encourage patient to monitor pain and request assistance  Note: Pain Assessment: None - Denies Pain  Pain Level: 0   Patient's Stated Pain Goal: 3   Is pain goal met at this time?  Yes  Patient reports foot pain, taking scheduled pain medications, reports decrease pain.   Non-Pharmaceutical Pain Intervention(s): Declines       Problem: Risk for Elopement  Goal: Patient will not exit the unit/facility without proper excort  10/11/2024 0053 by Bita Goss RN  Outcome: Progressing  Note: Patient has not been observed to be checking the exit doors or demonstrating behaviors of attempting to leave the unit.    10/10/2024 1057 by Rain Naranjo RN  Outcome: Progressing  Flowsheets (Taken 10/10/2024 0853)  Nursing Interventions for Elopement Risk: Assist with personal care needs such as toileting, eating, dressing, as needed to reduce the risk of wandering  Note: Patient has not been observed to be checking the exit doors or demonstrating behaviors of attempting to leave the unit.       Problem: Safety - Adult  Goal: Free from fall injury  10/11/2024 0053 by Bita Goss RN  Outcome: Progressing  Note: No falls were observed or reported so far this shift. Patient uses wheelchair to get around. Remains on fall precautions.    10/10/2024 1057

## 2024-10-11 NOTE — PLAN OF CARE
appropriate  10/11/2024 1255 by Rain Naranjo RN  Outcome: Progressing  Flowsheets (Taken 10/10/2024 0853)  Patient/family maintains appropriate behavior and adheres to behavioral management agreement, if implemented: Assess patient/family’s coping skills and  non-compliant behavior (including use of illegal substances)  Note: Patient has appropriate behavior and adheres to behavioral management agreement.          Problem: Involuntary Admit  Goal: Will cooperate with staff recommendations and doctor's orders and will demonstrate appropriate behavior  Description: INTERVENTIONS:  1. Treat underlying conditions and offer medication as ordered  2. Educate regarding involuntary admission procedures and rules  3. Contain excessive/inappropriate behavior per unit and hospital policies  10/11/2024 1255 by Rain Naranjo RN  Outcome: Progressing  Flowsheets (Taken 10/11/2024 0900)  Will cooperate with staff recommendations and doctor's orders and will demonstrate appropriate behavior: Treat underlying conditions and offer medication as ordered  Note: Patient cooperative with staff and doctor recommendations and demonstrates appropriate behavior.  Patient came out of room to eat lunch in dinning area.       Problem: Discharge Planning  Goal: Discharge to home or other facility with appropriate resources  10/11/2024 1255 by Rain Naranjo RN  Outcome: Progressing  Flowsheets (Taken 10/11/2024 0900)  Discharge to home or other facility with appropriate resources: Identify barriers to discharge with patient and caregiver  Note: Patient voices no needs before discharge. Patient plans to be discharged to [facility]. Discharge planner working with patient to achieve optimal discharge plans, specific to individual needs.       Problem: Musculoskeletal - Adult  Goal: Return mobility to safest level of function  10/11/2024 1255 by Rain Naranjo, RN  Outcome: Progressing  Flowsheets (Taken 10/11/2024 0900)  Return  with reality testing to support increasing orientation  3. Assess if patient's hallucinations or delusions are encouraging self harm or harm to others and intervene as appropriate  10/11/2024 1255 by Rain Naranjo RN  Outcome: Progressing  Note: Patient reports no hallucinations.       Problem: Depression  Goal: Will be euthymic at discharge  Description: INTERVENTIONS:  1. Administer medication as ordered  2. Provide emotional support via 1:1 interaction with staff  3. Encourage involvement in milieu/groups/activities  4. Monitor for social isolation  10/11/2024 1255 by Rain Naranjo RN  Outcome: Progressing  Note: Patient reports mood 4/10 with 10 being normal. Has flat affect. Speech clear. Fair eye contact. Reports no hope for future and identifies no one as their support system.  Patient reports depression at present time.       Problem: Chronic Conditions and Co-morbidities  Goal: Patient's chronic conditions and co-morbidity symptoms are monitored and maintained or improved  10/11/2024 1255 by Rain Naranjo RN  Outcome: Progressing  Flowsheets (Taken 10/11/2024 0900)  Care Plan - Patient's Chronic Conditions and Co-Morbidity Symptoms are Monitored and Maintained or Improved: Monitor and assess patient's chronic conditions and comorbid symptoms for stability, deterioration, or improvement     Care plan reviewed with patient.  Patient does verbalize understanding of the plan of care and does contribute to goal setting

## 2024-10-11 NOTE — CONSULTS
[facility]  Podiatric Medicine and Surgery Consultation Note      Asim Back  Medical record number:  [id number]  Age: 54 y.o.   Gender: male  : 1970  Episode date:  10/3/2024    Subjective     10/11  Patient was seen at bedside on behalf of Dr. Ashley.  Patient was alert and oriented to place and situation.  Thorough conversation with patient was discussed about the chronicity of the wound and that podiatry would have no intervention during this admission.  Discussed that we would recommend a more proximal amputation.  Patient was very unhappy and said that he would not go through with any recommendations.  Patient denied N/V/D/V/SOB no other pedal complaints    History of present illness    Patient is a 54 y.o. male with a history of hypertension, paraplegia, previous amputation, history of major depressive disorder seen bedside today on behalf of Dr Ashley. Patient appeared pleasant, was oriented to person, place and time and in no acute distress at this current time. Patient presented to the ED this morning due to suicidal ideation and was admitted to the psychiatric floor for treatment. Patient states he has had the wounds calluses on his foot for many years. States he was being seen by Dr. Cervantes in the past and was the one responsible for his foot being amputated. States he used to see Dr. Ashley but hasn't seen him in over 6 months. States Dr. Ashley had been using silver alginate on his wound previously. States that he most recently has been having home health change his dressings at home and states they have been using medi honey. States his left leg feels very painful and describes it as a burning pain. Patient states his feet are frequently resting against something like a wheelchair and that's why he believes he has calluses. Patient denies any N/V/F/C/SOB or CP. No other pedal concerns.              Past medical history        Diagnosis Date    Hypertension        Past

## 2024-10-11 NOTE — CONSULTS
results for input(s): \"LACTA\" in the last 72 hours.  Troponin: No results for input(s): \"CKTOTAL\", \"CKMB\", \"TROPONINI\" in the last 72 hours.  BNP: No results for input(s): \"BNP\" in the last 72 hours.  Lipids: No results for input(s): \"CHOL\", \"TRIG\", \"HDL\", \"LDL\" in the last 72 hours.    Invalid input(s): \"LDLCALC\"  ABGs: No results found for: \"PHART\", \"PO2ART\", \"OFU9LKN\", \"CAV9QUX\", \"BEART\"    Cultures: reviewed     CXR: reviewed      UA: No results for input(s): \"SPECGRAV\", \"PHUR\", \"COLORU\", \"CLARITYU\", \"MUCUS\", \"PROTEINU\", \"BLOODU\", \"RBCUA\", \"WBCUA\", \"BACTERIA\", \"NITRU\", \"GLUCOSEU\", \"BILIRUBINUR\", \"UROBILINOGEN\", \"KETUA\", \"LABCAST\", \"LABCASTTY\", \"AMORPHOS\" in the last 72 hours.    Invalid input(s): \"CRYSTALS\"      Imaging: reviewed    Micro:   Lab Results   Component Value Date/Time    BC No growth 24 hours. 10/10/2024 03:25 PM       Patient Active Problem List   Diagnosis    MDD (major depressive disorder), recurrent severe, without psychosis (HCC)    Suicidal ideation    Foot pain, left       Harsha Katz MD, MD, 10/11/2024 5:51 PM

## 2024-10-11 NOTE — ED PROVIDER NOTES
[facility]      EMERGENCY MEDICINE     Pt Name: Asim Back  MRN: [id number]  Birthdate 1970  Date of evaluation: 10/3/2024  Provider: MICHELLE Skinner CNP    CHIEF COMPLAINT       Chief Complaint   Patient presents with    Suicidal     HISTORY OF PRESENT ILLNESS   Asim Back is a pleasant 54 y.o. male who presents to the emergency department from home with c/o suicidal thoughts.  Patient was brought in under EM after making suicidal comments to the police.  He reportedly has had a large amount of EtOH tonight.      History is obtained from:  patient  PASTMEDICAL HISTORY     Past Medical History:   Diagnosis Date    Hypertension        Patient Active Problem List   Diagnosis Code    MDD (major depressive disorder), recurrent severe, without psychosis ([facility]) F33.2     SURGICAL HISTORY       Past Surgical History:   Procedure Laterality Date    DEBRIDEMENT Left     Left foot       CURRENT MEDICATIONS       Current Discharge Medication List        CONTINUE these medications which have NOT CHANGED    Details   gabapentin (NEURONTIN) 800 MG tablet Take 1 tablet by mouth 3 times daily.      oxyCODONE (OXY-IR) 30 MG immediate release tablet Take 1 tablet by mouth every 8 hours as needed for Pain.      atorvastatin (LIPITOR) 10 MG tablet Take 1 tablet by mouth nightly      vitamin D3 (CHOLECALCIFEROL) 25 MCG (1000 UT) TABS tablet Take 1 tablet by mouth daily      docusate sodium (COLACE) 100 MG capsule Take 1 capsule by mouth 2 times daily      DULoxetine (CYMBALTA) 30 MG extended release capsule Take 3 capsules by mouth daily DR capsule      folic acid (FOLVITE) 400 MCG tablet Take 1 tablet by mouth daily      ibuprofen (ADVIL;MOTRIN) 800 MG tablet Take 1 tablet by mouth 2 times daily      omeprazole (PRILOSEC) 40 MG delayed release capsule Take 1 capsule by mouth daily      magnesium 30 MG tablet Take 200 mg by mouth 2 times daily      lidocaine (XYLOCAINE) 5 % ointment 1 Application as needed

## 2024-10-11 NOTE — PROGRESS NOTES
- 5.2 meq/L Final    Chloride 10/09/2024 99  98 - 111 meq/L Final    CO2 10/09/2024 25  23 - 33 meq/L Final    Glucose 10/09/2024 91  70 - 108 mg/dL Final    BUN 10/09/2024 19  7 - 22 mg/dL Final    Creatinine 10/09/2024 0.9  0.4 - 1.2 mg/dL Final    Calcium 10/09/2024 8.9  8.5 - 10.5 mg/dL Final    [place] [address]    Sed Rate, Automated 10/09/2024 43 (H)  0 - 10 mm/hr [place] [address]    CRP 10/09/2024 6.52 (H)  0.00 - 1.00 mg/dl [place] [address]    Anion Gap 10/09/2024 11.0  8.0 - 16.0 meq/L Final    Comment: ANION GAP = Sodium -(Chloride + CO2)  [place] [address]      Est, Glom Filt Rate 10/09/2024 > 90  >60 ml/min/1.73m2 Final    Comment: Pediatric calculator link  https://www.kidney.org/professionals/kdoqi/gfr_calculatorped  Effective Oct 3, 2022  These results are not intended for use in patients  <18 years of age.  eGFR results are calculated without a race factor  using the 2021 CKD-EPI equation.  Careful clinical  correlation is recommended, particularly when comparing  to results calculated using previous equations. The  CKD-EPI equation is less accurate in patients with  extremes of muscle mass, extra-renal metabolism of  creatinine, excessive creatine ingestion, or following  therapy that affects renal tubular secretion.  [place] [address]      Body Surface Area 10/10/2024 2.16  m2 Final            Medications  Current Facility-Administered Medications: ARIPiprazole (ABILIFY) tablet 5 mg, 5 mg, Oral, Nightly  gadoteridol (PROHANCE) injection 15 mL, 15 mL, IntraVENous, ONCE PRN  ciprofloxacin (CIPRO) tablet 500 mg, 500 mg, Oral, 2 times per day  oxyCODONE (OXY-IR) immediate release tablet 30 mg, 30 mg, Oral, 3 times per day  acetaminophen  (TYLENOL) tablet 650 mg, 650 mg, Oral, Q8H  ibuprofen (ADVIL;MOTRIN) tablet 800 mg, 800 mg, Oral, 3 times per day  polyethylene glycol (GLYCOLAX) packet 17 g, 17 g, Oral, Daily  senna (SENOKOT) tablet 8.6 mg, 1 tablet, Oral, Nightly  DULoxetine (CYMBALTA) extended release capsule 30 mg, 30 mg, Oral, TID  acetaminophen (TYLENOL) tablet 650 mg, 650 mg, Oral, Q4H PRN  magnesium hydroxide (MILK OF MAGNESIA) 400 MG/5ML suspension 30 mL, 30 mL, Oral, Daily PRN  aluminum & magnesium hydroxide-simethicone (MAALOX) 200-200-20 MG/5ML suspension 30 mL, 30 mL, Oral, Q6H PRN  hydrOXYzine HCl (ATARAX) tablet 50 mg, 50 mg, Oral, TID PRN  traZODone (DESYREL) tablet 50 mg, 50 mg, Oral, Nightly PRN  nicotine (NICODERM CQ) 14 MG/24HR 1 patch, 1 patch, TransDERmal, Daily  gabapentin (NEURONTIN) capsule 800 mg, 800 mg, Oral, TID  cyclobenzaprine (FLEXERIL) tablet 10 mg, 10 mg, Oral, TID PRN  amLODIPine (NORVASC) tablet 10 mg, 10 mg, Oral, Daily  aspirin chewable tablet 81 mg, 81 mg, Oral, Daily  atorvastatin (LIPITOR) tablet 10 mg, 10 mg, Oral, Nightly  docusate sodium (COLACE) capsule 100 mg, 100 mg, Oral, BID  folic acid (FOLVITE) tablet 1 mg, 1 mg, Oral, Daily  pantoprazole (PROTONIX) tablet 40 mg, 40 mg, Oral, QAM AC  tamsulosin (FLOMAX) capsule 0.4 mg, 0.4 mg, Oral, Daily  Vitamin D (CHOLECALCIFEROL) tablet 1,000 Units, 1 tablet, Oral, Daily    ASSESSMENT  MDD (major depressive disorder), recurrent severe, without psychosis (HCC)     PLAN  Patient s symptoms are improving  Attempt to develop insight  Psycho-education conducted.  Supportive Therapy conducted.  Probable discharge is on Monday  Follow-up TBD    Electronically signed by Jayy Aguillon DO on 10/11/24 at 8:26 AM EDT    **This report has been created using voice recognition software. It may contain minor errors which are inherent in voice recognition technology.**                                         Psychiatry Attending Attestation     I independently saw and

## 2024-10-11 NOTE — PROGRESS NOTES
Group Therapy Note    Date: 10/11/2024  Start Time: 0930  End Time:  1030  Number of Participants: 5    Type of Group: Psychotherapy    Notes:  Patient was present in group. Group members were introduced to the cognitive triangle to discuss how situations affected by thoughts, feelings, and behaviors. Members discussed difficulties of expressing our emotions.     Status After Intervention:  Improved    Participation Level: Active Listener and Interactive    Participation Quality: Appropriate, Attentive, Sharing, and Supportive      Speech:  normal      Thought Process/Content: Logical  Linear      Affective Functioning: Congruent      Mood: euthymic      Level of consciousness:  Alert, Oriented x4, and Attentive      Response to Learning: Able to verbalize current knowledge/experience, Able to verbalize/acknowledge new learning, Able to retain information, Capable of insight, Able to change behavior, and Progressing to goal      Endings: None Reported    Modes of Intervention: Education, Support, Socialization, Exploration, Clarifying, and Problem-solving      Discipline Responsible: /Counselor      Signature:  KEVON Persaud
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
## 2024-10-11 NOTE — BH NOTE
Goal Wrap-Up/Relaxation Group    Date: 10/10/2024  Start Time: 2000  End Time:  2020    Type of Group: Goal Wrap Up and Relaxation    States that goal today was:     Goal for today was No goal set today    Patient Did not participate in group/activities      Signature: Tamiko Bass LPN

## 2024-10-12 PROBLEM — F33.2 SEVERE EPISODE OF RECURRENT MAJOR DEPRESSIVE DISORDER, WITHOUT PSYCHOTIC FEATURES (HCC): Status: ACTIVE | Noted: 2024-10-12

## 2024-10-12 LAB
BACTERIA BLD AEROBE CULT: NORMAL
BACTERIA BLD AEROBE CULT: NORMAL

## 2024-10-12 PROCEDURE — 6370000000 HC RX 637 (ALT 250 FOR IP): Performed by: STUDENT IN AN ORGANIZED HEALTH CARE EDUCATION/TRAINING PROGRAM

## 2024-10-12 PROCEDURE — 6370000000 HC RX 637 (ALT 250 FOR IP)

## 2024-10-12 PROCEDURE — 99231 SBSQ HOSP IP/OBS SF/LOW 25: CPT | Performed by: NURSE PRACTITIONER

## 2024-10-12 PROCEDURE — 6370000000 HC RX 637 (ALT 250 FOR IP): Performed by: PHYSICIAN ASSISTANT

## 2024-10-12 PROCEDURE — 99232 SBSQ HOSP IP/OBS MODERATE 35: CPT | Performed by: STUDENT IN AN ORGANIZED HEALTH CARE EDUCATION/TRAINING PROGRAM

## 2024-10-12 PROCEDURE — 1240000000 HC EMOTIONAL WELLNESS R&B

## 2024-10-12 PROCEDURE — 6370000000 HC RX 637 (ALT 250 FOR IP): Performed by: PSYCHIATRY & NEUROLOGY

## 2024-10-12 RX ADMIN — SENNOSIDES 8.6 MG: 8.6 TABLET, FILM COATED ORAL at 21:02

## 2024-10-12 RX ADMIN — GABAPENTIN 800 MG: 400 CAPSULE ORAL at 14:09

## 2024-10-12 RX ADMIN — SENNOSIDES 8.6 MG: 8.6 TABLET, FILM COATED ORAL at 08:41

## 2024-10-12 RX ADMIN — POLYETHYLENE GLYCOL 3350 17 G: 17 POWDER, FOR SOLUTION ORAL at 08:41

## 2024-10-12 RX ADMIN — IBUPROFEN 800 MG: 800 TABLET, FILM COATED ORAL at 03:18

## 2024-10-12 RX ADMIN — GABAPENTIN 800 MG: 400 CAPSULE ORAL at 08:41

## 2024-10-12 RX ADMIN — GABAPENTIN 800 MG: 400 CAPSULE ORAL at 21:02

## 2024-10-12 RX ADMIN — DULOXETINE HYDROCHLORIDE 30 MG: 30 CAPSULE, DELAYED RELEASE ORAL at 14:09

## 2024-10-12 RX ADMIN — AMLODIPINE BESYLATE 10 MG: 10 TABLET ORAL at 08:41

## 2024-10-12 RX ADMIN — DOCUSATE SODIUM 100 MG: 100 CAPSULE, LIQUID FILLED ORAL at 21:02

## 2024-10-12 RX ADMIN — ACETAMINOPHEN 650 MG: 325 TABLET ORAL at 14:09

## 2024-10-12 RX ADMIN — CIPROFLOXACIN HYDROCHLORIDE 500 MG: 500 TABLET, FILM COATED ORAL at 08:41

## 2024-10-12 RX ADMIN — IBUPROFEN 800 MG: 800 TABLET, FILM COATED ORAL at 08:41

## 2024-10-12 RX ADMIN — ASPIRIN 81 MG 81 MG: 81 TABLET ORAL at 08:41

## 2024-10-12 RX ADMIN — DOCUSATE SODIUM 100 MG: 100 CAPSULE, LIQUID FILLED ORAL at 08:41

## 2024-10-12 RX ADMIN — OXYCODONE HYDROCHLORIDE 30 MG: 15 TABLET ORAL at 14:09

## 2024-10-12 RX ADMIN — ARIPIPRAZOLE 5 MG: 5 TABLET ORAL at 21:02

## 2024-10-12 RX ADMIN — OXYCODONE HYDROCHLORIDE 30 MG: 15 TABLET ORAL at 21:02

## 2024-10-12 RX ADMIN — PANTOPRAZOLE SODIUM 40 MG: 40 TABLET, DELAYED RELEASE ORAL at 08:41

## 2024-10-12 RX ADMIN — ACETAMINOPHEN 650 MG: 325 TABLET ORAL at 05:59

## 2024-10-12 RX ADMIN — ATORVASTATIN CALCIUM 10 MG: 10 TABLET, FILM COATED ORAL at 21:02

## 2024-10-12 RX ADMIN — Medication 1000 UNITS: at 08:41

## 2024-10-12 RX ADMIN — DULOXETINE HYDROCHLORIDE 30 MG: 30 CAPSULE, DELAYED RELEASE ORAL at 08:41

## 2024-10-12 RX ADMIN — FOLIC ACID 1 MG: 1 TABLET ORAL at 08:41

## 2024-10-12 RX ADMIN — OXYCODONE HYDROCHLORIDE 30 MG: 15 TABLET ORAL at 06:00

## 2024-10-12 RX ADMIN — DULOXETINE HYDROCHLORIDE 30 MG: 30 CAPSULE, DELAYED RELEASE ORAL at 21:02

## 2024-10-12 RX ADMIN — CYCLOBENZAPRINE 10 MG: 10 TABLET, FILM COATED ORAL at 10:02

## 2024-10-12 RX ADMIN — ACETAMINOPHEN 650 MG: 325 TABLET ORAL at 21:00

## 2024-10-12 RX ADMIN — TAMSULOSIN HYDROCHLORIDE 0.4 MG: 0.4 CAPSULE ORAL at 08:41

## 2024-10-12 ASSESSMENT — PAIN DESCRIPTION - ORIENTATION
ORIENTATION: LOWER
ORIENTATION: LEFT;RIGHT
ORIENTATION: OTHER (COMMENT)
ORIENTATION: OTHER (COMMENT)
ORIENTATION: LOWER

## 2024-10-12 ASSESSMENT — PAIN - FUNCTIONAL ASSESSMENT
PAIN_FUNCTIONAL_ASSESSMENT: PREVENTS OR INTERFERES WITH MANY ACTIVE NOT PASSIVE ACTIVITIES
PAIN_FUNCTIONAL_ASSESSMENT: ACTIVITIES ARE NOT PREVENTED
PAIN_FUNCTIONAL_ASSESSMENT: PREVENTS OR INTERFERES SOME ACTIVE ACTIVITIES AND ADLS

## 2024-10-12 ASSESSMENT — PAIN DESCRIPTION - DESCRIPTORS
DESCRIPTORS: ACHING;JABBING
DESCRIPTORS: ACHING;JABBING
DESCRIPTORS: SPASM
DESCRIPTORS: ACHING
DESCRIPTORS: ACHING;DISCOMFORT

## 2024-10-12 ASSESSMENT — PAIN SCALES - GENERAL
PAINLEVEL_OUTOF10: 3
PAINLEVEL_OUTOF10: 0
PAINLEVEL_OUTOF10: 0
PAINLEVEL_OUTOF10: 2
PAINLEVEL_OUTOF10: 4
PAINLEVEL_OUTOF10: 8
PAINLEVEL_OUTOF10: 10
PAINLEVEL_OUTOF10: 0
PAINLEVEL_OUTOF10: 2
PAINLEVEL_OUTOF10: 10
PAINLEVEL_OUTOF10: 0
PAINLEVEL_OUTOF10: 3
PAINLEVEL_OUTOF10: 0

## 2024-10-12 ASSESSMENT — PAIN DESCRIPTION - LOCATION
LOCATION: LEG
LOCATION: GENERALIZED
LOCATION: BACK;LEG
LOCATION: LEG
LOCATION: GENERALIZED

## 2024-10-12 ASSESSMENT — PAIN SCALES - WONG BAKER
WONGBAKER_NUMERICALRESPONSE: NO HURT
WONGBAKER_NUMERICALRESPONSE: HURTS A LITTLE BIT
WONGBAKER_NUMERICALRESPONSE: NO HURT
WONGBAKER_NUMERICALRESPONSE: HURTS A LITTLE BIT
WONGBAKER_NUMERICALRESPONSE: NO HURT

## 2024-10-12 ASSESSMENT — PAIN DESCRIPTION - PAIN TYPE: TYPE: CHRONIC PAIN

## 2024-10-12 ASSESSMENT — PAIN DESCRIPTION - ONSET: ONSET: GRADUAL

## 2024-10-12 NOTE — PROGRESS NOTES
Psychiatry Progress Note      10-    CC: Depression; Suicidal ideation                    Subjective    Progress:  [place] is seen bedside with sittter. Reports depressed mood is improving. Denies feelings of harm towards self or others. Reports medication is helping. Denies having side effects. Good med compliance is verified. Reports appetite and sleep are better. Verified slept 7 hours broken. States he attended groups yesterday. Denies getting any visits but reports did talk to his ex wife on phone. States they had a good conversation.     Objective  BP (!) 139/92   Pulse 90   Temp 98.2 °F (36.8 °C) (Oral)   Resp 14   Ht 1.956 m (6' 5\")   Wt 86.2 kg (190 lb)   SpO2 95%   BMI 22.53 kg/m²      MSE:  Level of consciousness: Alert  Appearance: hospital attire, in bed and fair grooming   Behavior/Motor: no abnormalities noted   Attitude toward examiner: cooperative   Speech: Normal volume, goal directed, NRR  Mood: Slight dysthymic  Affect: Reactive  Thought processes: Linear and goal directed   Suicidal Ideation: Denies suicidal ideations  Homicidal ideation: Denies homicidal ideations  Delusions: No evidence of delusions is observed  Perceptual Disturbance: Denies AH/VH  Cognition: Oriented to person, place, time and situation   Concentration fair   Memory intact   Insight: Limited  Judgment: Limited    Assessment:  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  Supportive Therapy conducted.  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

## 2024-10-12 NOTE — PLAN OF CARE
Problem: Pain  Goal: Verbalizes/displays adequate comfort level or baseline comfort level  10/12/2024 0911 by Humes, Heather, RN  Outcome: Progressing  Flowsheets (Taken 10/10/2024 0750 by Rain Naranjo, RN)  Verbalizes/displays adequate comfort level or baseline comfort level: Encourage patient to monitor pain and request assistance  Note: Patient reports generalized achy pain of a 2/10 scheduled motrin given per MAR with some relief noted.      Problem: Risk for Elopement  Goal: Patient will not exit the unit/facility without proper excort  10/12/2024 0911 by Humes, Heather, RN  Outcome: Progressing  Flowsheets (Taken 10/11/2024 2211 by Ovi Vital, RN)  Nursing Interventions for Elopement Risk: Assist with personal care needs such as toileting, eating, dressing, as needed to reduce the risk of wandering  Note: No attempts to elope so far this shift.      Problem: Safety - Adult  Goal: Free from fall injury  10/12/2024 0911 by Humes, Heather, RN  Outcome: Progressing  Flowsheets (Taken 10/5/2024 0040 by Nadege Byrne, RN)  Free From Fall Injury: Instruct family/caregiver on patient safety  Note: Patient free from falls  so far this shift.      Problem: Anxiety  Goal: Will report anxiety at manageable levels  Description: INTERVENTIONS:  1. Administer medication as ordered  2. Teach and rehearse alternative coping skills  3. Provide emotional support with 1:1 interaction with staff  10/12/2024 0911 by Humes, Heather, RN  Outcome: Progressing  Flowsheets (Taken 10/11/2024 2211 by Ovi Vital RN)  Will report anxiety at manageable levels: Teach and rehearse alternative coping skills  Note: Patient denies anxiety this shift.      Problem: Coping  Goal: Pt/Family able to verbalize concerns and demonstrate effective coping strategies  Description: INTERVENTIONS:  1. Assist patient/family to identify coping skills, available support systems and cultural and spiritual values  2. Provide
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
## 2024-10-12 NOTE — PLAN OF CARE
Problem: Musculoskeletal - Adult  Goal: Return mobility to safest level of function  10/11/2024 2229 by Ovi Vital RN  Outcome: Not Progressing  Flowsheets (Taken 10/11/2024 2211)  Return Mobility to Safest Level of Function: Assist with transfers and ambulation using safe patient handling equipment as needed  Note: Patient is been bed bound and needs help for tranfers to wheel chair   10/11/2024 1255 by Rain Naranjo RN  Outcome: Progressing  Flowsheets (Taken 10/11/2024 0900)  Return Mobility to Safest Level of Function: Assess patient stability and activity tolerance for standing, transferring and ambulating with or without assistive devices  Note: Patient up in wheelchair when out of bed.     Problem: Musculoskeletal - Adult  Goal: Return mobility to safest level of function  10/11/2024 2229 by Ovi Vital RN  Outcome: Not Progressing  Flowsheets (Taken 10/11/2024 2211)  Return Mobility to Safest Level of Function: Assist with transfers and ambulation using safe patient handling equipment as needed  Note: Patient is been bed bound and needs help for tranfers to wheel chair   10/11/2024 1255 by Rain Naranjo RN  Outcome: Progressing  Flowsheets (Taken 10/11/2024 0900)  Return Mobility to Safest Level of Function: Assess patient stability and activity tolerance for standing, transferring and ambulating with or without assistive devices  Note: Patient up in wheelchair when out of bed.

## 2024-10-13 VITALS
DIASTOLIC BLOOD PRESSURE: 100 MMHG | TEMPERATURE: 97.2 F | BODY MASS INDEX: 22.43 KG/M2 | OXYGEN SATURATION: 97 % | WEIGHT: 190 LBS | HEIGHT: 77 IN | SYSTOLIC BLOOD PRESSURE: 150 MMHG | HEART RATE: 78 BPM | RESPIRATION RATE: 14 BRPM

## 2024-10-13 PROCEDURE — 6370000000 HC RX 637 (ALT 250 FOR IP): Performed by: STUDENT IN AN ORGANIZED HEALTH CARE EDUCATION/TRAINING PROGRAM

## 2024-10-13 PROCEDURE — 6370000000 HC RX 637 (ALT 250 FOR IP)

## 2024-10-13 PROCEDURE — 6370000000 HC RX 637 (ALT 250 FOR IP): Performed by: PHYSICIAN ASSISTANT

## 2024-10-13 PROCEDURE — 99232 SBSQ HOSP IP/OBS MODERATE 35: CPT | Performed by: STUDENT IN AN ORGANIZED HEALTH CARE EDUCATION/TRAINING PROGRAM

## 2024-10-13 PROCEDURE — 6370000000 HC RX 637 (ALT 250 FOR IP): Performed by: PSYCHIATRY & NEUROLOGY

## 2024-10-13 PROCEDURE — 1240000000 HC EMOTIONAL WELLNESS R&B

## 2024-10-13 PROCEDURE — 99231 SBSQ HOSP IP/OBS SF/LOW 25: CPT | Performed by: NURSE PRACTITIONER

## 2024-10-13 RX ADMIN — GABAPENTIN 800 MG: 400 CAPSULE ORAL at 21:29

## 2024-10-13 RX ADMIN — GABAPENTIN 800 MG: 400 CAPSULE ORAL at 14:32

## 2024-10-13 RX ADMIN — ARIPIPRAZOLE 5 MG: 5 TABLET ORAL at 21:29

## 2024-10-13 RX ADMIN — DULOXETINE HYDROCHLORIDE 30 MG: 30 CAPSULE, DELAYED RELEASE ORAL at 08:25

## 2024-10-13 RX ADMIN — ACETAMINOPHEN 650 MG: 325 TABLET ORAL at 21:29

## 2024-10-13 RX ADMIN — POLYETHYLENE GLYCOL 3350 17 G: 17 POWDER, FOR SOLUTION ORAL at 08:26

## 2024-10-13 RX ADMIN — OXYCODONE HYDROCHLORIDE 30 MG: 15 TABLET ORAL at 21:27

## 2024-10-13 RX ADMIN — ACETAMINOPHEN 650 MG: 325 TABLET ORAL at 05:59

## 2024-10-13 RX ADMIN — ASPIRIN 81 MG 81 MG: 81 TABLET ORAL at 08:25

## 2024-10-13 RX ADMIN — OXYCODONE HYDROCHLORIDE 30 MG: 15 TABLET ORAL at 14:32

## 2024-10-13 RX ADMIN — SENNOSIDES 8.6 MG: 8.6 TABLET, FILM COATED ORAL at 21:29

## 2024-10-13 RX ADMIN — Medication 1000 UNITS: at 08:25

## 2024-10-13 RX ADMIN — TAMSULOSIN HYDROCHLORIDE 0.4 MG: 0.4 CAPSULE ORAL at 08:25

## 2024-10-13 RX ADMIN — DOCUSATE SODIUM 100 MG: 100 CAPSULE, LIQUID FILLED ORAL at 08:26

## 2024-10-13 RX ADMIN — SENNOSIDES 8.6 MG: 8.6 TABLET, FILM COATED ORAL at 08:25

## 2024-10-13 RX ADMIN — GABAPENTIN 800 MG: 400 CAPSULE ORAL at 08:25

## 2024-10-13 RX ADMIN — ACETAMINOPHEN 650 MG: 325 TABLET ORAL at 14:32

## 2024-10-13 RX ADMIN — ATORVASTATIN CALCIUM 10 MG: 10 TABLET, FILM COATED ORAL at 21:29

## 2024-10-13 RX ADMIN — IBUPROFEN 800 MG: 800 TABLET, FILM COATED ORAL at 17:39

## 2024-10-13 RX ADMIN — DULOXETINE HYDROCHLORIDE 30 MG: 30 CAPSULE, DELAYED RELEASE ORAL at 14:32

## 2024-10-13 RX ADMIN — PANTOPRAZOLE SODIUM 40 MG: 40 TABLET, DELAYED RELEASE ORAL at 08:26

## 2024-10-13 RX ADMIN — DOCUSATE SODIUM 100 MG: 100 CAPSULE, LIQUID FILLED ORAL at 21:27

## 2024-10-13 RX ADMIN — IBUPROFEN 800 MG: 800 TABLET, FILM COATED ORAL at 08:26

## 2024-10-13 RX ADMIN — DULOXETINE HYDROCHLORIDE 30 MG: 30 CAPSULE, DELAYED RELEASE ORAL at 21:29

## 2024-10-13 RX ADMIN — AMLODIPINE BESYLATE 10 MG: 10 TABLET ORAL at 08:25

## 2024-10-13 RX ADMIN — CYCLOBENZAPRINE 10 MG: 10 TABLET, FILM COATED ORAL at 08:26

## 2024-10-13 RX ADMIN — OXYCODONE HYDROCHLORIDE 30 MG: 15 TABLET ORAL at 06:00

## 2024-10-13 RX ADMIN — FOLIC ACID 1 MG: 1 TABLET ORAL at 08:25

## 2024-10-13 ASSESSMENT — PAIN SCALES - GENERAL
PAINLEVEL_OUTOF10: 10
PAINLEVEL_OUTOF10: 8
PAINLEVEL_OUTOF10: 0
PAINLEVEL_OUTOF10: 3
PAINLEVEL_OUTOF10: 1
PAINLEVEL_OUTOF10: 0
PAINLEVEL_OUTOF10: 0
PAINLEVEL_OUTOF10: 10
PAINLEVEL_OUTOF10: 3
PAINLEVEL_OUTOF10: 9

## 2024-10-13 ASSESSMENT — PAIN DESCRIPTION - ORIENTATION
ORIENTATION: RIGHT;LEFT
ORIENTATION: LOWER
ORIENTATION: OTHER (COMMENT)
ORIENTATION: POSTERIOR;DISTAL

## 2024-10-13 ASSESSMENT — PAIN DESCRIPTION - DESCRIPTORS
DESCRIPTORS: ACHING
DESCRIPTORS: ACHING;JABBING
DESCRIPTORS: ACHING
DESCRIPTORS: ACHING
DESCRIPTORS: ACHING;JABBING

## 2024-10-13 ASSESSMENT — PAIN DESCRIPTION - LOCATION
LOCATION: LEG
LOCATION: BACK;LEG
LOCATION: BACK;LEG
LOCATION: OTHER (COMMENT)
LOCATION: BACK

## 2024-10-13 ASSESSMENT — PAIN - FUNCTIONAL ASSESSMENT
PAIN_FUNCTIONAL_ASSESSMENT: PREVENTS OR INTERFERES WITH MANY ACTIVE NOT PASSIVE ACTIVITIES
PAIN_FUNCTIONAL_ASSESSMENT: ACTIVITIES ARE NOT PREVENTED
PAIN_FUNCTIONAL_ASSESSMENT: ACTIVITIES ARE NOT PREVENTED
PAIN_FUNCTIONAL_ASSESSMENT: PREVENTS OR INTERFERES SOME ACTIVE ACTIVITIES AND ADLS
PAIN_FUNCTIONAL_ASSESSMENT: PREVENTS OR INTERFERES WITH MANY ACTIVE NOT PASSIVE ACTIVITIES

## 2024-10-13 ASSESSMENT — PAIN SCALES - WONG BAKER
WONGBAKER_NUMERICALRESPONSE: HURTS A LITTLE BIT
WONGBAKER_NUMERICALRESPONSE: NO HURT
WONGBAKER_NUMERICALRESPONSE: HURTS A LITTLE BIT
WONGBAKER_NUMERICALRESPONSE: NO HURT

## 2024-10-13 NOTE — PLAN OF CARE
Problem: Coping  Goal: Pt/Family able to verbalize concerns and demonstrate effective coping strategies  Description: INTERVENTIONS:  1. Assist patient/family to identify coping skills, available support systems and cultural and spiritual values  2. Provide emotional support, including active listening and acknowledgement of concerns of patient and caregivers  3. Reduce environmental stimuli, as able  4. Instruct patient/family in relaxation techniques, as appropriate  5. Assess for spiritual pain/suffering and initiate Spiritual Care, Psychosocial Clinical Specialist consults as needed  10/13/2024 1458 by Nicole Castaneda OT  Outcome: Not Progressing  Flowsheets (Taken 10/13/2024 1458)  Patient/family able to verbalize anxieties, fears, and concerns, and demonstrate effective coping:   Assist patient/family to identify coping skills, available support systems and cultural and spiritual values   Provide emotional support, including active listening and acknowledgement of concerns of patient and caregivers  Note: Pt has attended 0/3 group therapy sessions offered thus far today. Pt has been given maximum verbal encouragement to attend group therapy sessions, pt has been isolative to his room the majority of the day. Intermittently, pt is seen out on the unit interacting with others. Plan to continue to monitor progress and encourage participation in group therapy programming.    10/13/2024 0900 by Humes, Heather, RN  Outcome: Progressing  Flowsheets (Taken 10/12/2024 2146 by Ovi Vital RN)  Patient/family able to verbalize anxieties, fears, and concerns, and demonstrate effective coping: Assist patient/family to identify coping skills, available support systems and cultural and spiritual values  Note: Patient encouraged to attend groups in order to develop positive coping skills.

## 2024-10-13 NOTE — PLAN OF CARE
Problem: Pain  Goal: Verbalizes/displays adequate comfort level or baseline comfort level  10/13/2024 0900 by Humes, Heather, RN  Outcome: Progressing  Flowsheets (Taken 10/10/2024 0750 by Rain Naranjo, RN)  Verbalizes/displays adequate comfort level or baseline comfort level: Encourage patient to monitor pain and request assistance  Note: Patient reports pain of a 2/10 with 10 being the worst pain PRN motrin given per MAR with some relief noted.      Problem: Risk for Elopement  Goal: Patient will not exit the unit/facility without proper excort  10/13/2024 0900 by Humes, Heather, RN  Outcome: Progressing  Flowsheets (Taken 10/12/2024 2146 by Ovi Vital, RN)  Nursing Interventions for Elopement Risk: Assist with personal care needs such as toileting, eating, dressing, as needed to reduce the risk of wandering  Note: No attempts to elope so far this shift.      Problem: Safety - Adult  Goal: Free from fall injury  10/13/2024 0900 by Humes, Heather, RN  Outcome: Progressing  Flowsheets (Taken 10/5/2024 0040 by Nadege Byrne, RN)  Free From Fall Injury: Instruct family/caregiver on patient safety  Note: Patient free from falls so far this shift.      Problem: Anxiety  Goal: Will report anxiety at manageable levels  Description: INTERVENTIONS:  1. Administer medication as ordered  2. Teach and rehearse alternative coping skills  3. Provide emotional support with 1:1 interaction with staff  10/13/2024 0900 by Humes, Heather, RN  Outcome: Progressing  Flowsheets (Taken 10/12/2024 2146 by Ovi Vital, RN)  Will report anxiety at manageable levels: Teach and rehearse alternative coping skills  Note: Patient denies anxiety at this time.      Problem: Coping  Goal: Pt/Family able to verbalize concerns and demonstrate effective coping strategies  Description: INTERVENTIONS:  1. Assist patient/family to identify coping skills, available support systems and cultural and spiritual values  2.
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
## 2024-10-13 NOTE — GROUP NOTE
Group Therapy Note    Date: 10/13/2024    Group Start Time: 0900  Group End Time: 0930  Group Topic: Community Meeting    [facility] Adult Psych 7E    Nicole Castaneda OT        Group Therapy Note    Attendees: 8       Patient's Goal: To read at least 2 paragraphs out of the Bible     Notes:  Pt. Reports that he wants to continue reading Confucianism materials. Pt. Was educated on where to locate the books on the unit. Pt is an active participant in group conversation with verbal cues and prompting from writer. Without verbal prompt, patient is an active listener.      Status After Intervention:  Improved    Participation Level: Active Listener and Interactive    Participation Quality: Appropriate, Attentive, Sharing, and Supportive      Speech:  normal      Thought Process/Content: Logical  Linear      Affective Functioning: Blunted and Flat      Mood: euthymic      Level of consciousness:  Alert, Oriented x4, and Attentive      Response to Learning: Able to verbalize current knowledge/experience, Able to verbalize/acknowledge new learning, Able to retain information, Capable of insight, Able to change behavior, and Progressing to goal      Endings: None Reported    Modes of Intervention: Education, Support, Socialization, Exploration, Clarifying, Problem-solving, and Activity      Discipline Responsible: Recreational Therapist      Signature:  Nicole Castaneda OT

## 2024-10-14 VITALS
TEMPERATURE: 97.6 F | BODY MASS INDEX: 22.43 KG/M2 | HEIGHT: 77 IN | WEIGHT: 190 LBS | OXYGEN SATURATION: 98 % | DIASTOLIC BLOOD PRESSURE: 90 MMHG | HEART RATE: 94 BPM | RESPIRATION RATE: 16 BRPM | SYSTOLIC BLOOD PRESSURE: 159 MMHG

## 2024-10-14 PROCEDURE — 99239 HOSP IP/OBS DSCHRG MGMT >30: CPT | Performed by: PSYCHIATRY & NEUROLOGY

## 2024-10-14 PROCEDURE — 6370000000 HC RX 637 (ALT 250 FOR IP): Performed by: PHYSICIAN ASSISTANT

## 2024-10-14 PROCEDURE — 6370000000 HC RX 637 (ALT 250 FOR IP): Performed by: STUDENT IN AN ORGANIZED HEALTH CARE EDUCATION/TRAINING PROGRAM

## 2024-10-14 PROCEDURE — 5130000000 HC BRIDGE APPOINTMENT

## 2024-10-14 PROCEDURE — 6370000000 HC RX 637 (ALT 250 FOR IP): Performed by: PSYCHIATRY & NEUROLOGY

## 2024-10-14 RX ORDER — TRAZODONE HYDROCHLORIDE 50 MG/1
50 TABLET, FILM COATED ORAL NIGHTLY PRN
Qty: 30 TABLET | Refills: 0 | Status: SHIPPED | OUTPATIENT
Start: 2024-10-14

## 2024-10-14 RX ORDER — ATORVASTATIN CALCIUM 10 MG/1
10 TABLET, FILM COATED ORAL NIGHTLY
Qty: 30 TABLET | Refills: 0 | Status: SHIPPED | OUTPATIENT
Start: 2024-10-14

## 2024-10-14 RX ORDER — ARIPIPRAZOLE 5 MG/1
5 TABLET ORAL NIGHTLY
Qty: 30 TABLET | Refills: 0 | Status: SHIPPED | OUTPATIENT
Start: 2024-10-14

## 2024-10-14 RX ADMIN — TAMSULOSIN HYDROCHLORIDE 0.4 MG: 0.4 CAPSULE ORAL at 07:52

## 2024-10-14 RX ADMIN — AMLODIPINE BESYLATE 10 MG: 10 TABLET ORAL at 07:56

## 2024-10-14 RX ADMIN — DOCUSATE SODIUM 100 MG: 100 CAPSULE, LIQUID FILLED ORAL at 07:57

## 2024-10-14 RX ADMIN — DULOXETINE HYDROCHLORIDE 30 MG: 30 CAPSULE, DELAYED RELEASE ORAL at 07:57

## 2024-10-14 RX ADMIN — OXYCODONE HYDROCHLORIDE 30 MG: 15 TABLET ORAL at 08:10

## 2024-10-14 RX ADMIN — ASPIRIN 81 MG 81 MG: 81 TABLET ORAL at 07:57

## 2024-10-14 RX ADMIN — PANTOPRAZOLE SODIUM 40 MG: 40 TABLET, DELAYED RELEASE ORAL at 07:57

## 2024-10-14 RX ADMIN — IBUPROFEN 800 MG: 800 TABLET, FILM COATED ORAL at 01:43

## 2024-10-14 RX ADMIN — Medication 1000 UNITS: at 07:57

## 2024-10-14 RX ADMIN — GABAPENTIN 800 MG: 400 CAPSULE ORAL at 07:57

## 2024-10-14 RX ADMIN — FOLIC ACID 1 MG: 1 TABLET ORAL at 07:57

## 2024-10-14 ASSESSMENT — PAIN - FUNCTIONAL ASSESSMENT
PAIN_FUNCTIONAL_ASSESSMENT: PREVENTS OR INTERFERES WITH MANY ACTIVE NOT PASSIVE ACTIVITIES
PAIN_FUNCTIONAL_ASSESSMENT: ACTIVITIES ARE NOT PREVENTED

## 2024-10-14 ASSESSMENT — PAIN DESCRIPTION - LOCATION
LOCATION: FOOT
LOCATION: BACK;LEG

## 2024-10-14 ASSESSMENT — PAIN DESCRIPTION - DESCRIPTORS
DESCRIPTORS: ACHING;JABBING
DESCRIPTORS: ACHING

## 2024-10-14 ASSESSMENT — PAIN SCALES - GENERAL
PAINLEVEL_OUTOF10: 8
PAINLEVEL_OUTOF10: 10

## 2024-10-14 ASSESSMENT — PAIN DESCRIPTION - ORIENTATION: ORIENTATION: LEFT

## 2024-10-14 NOTE — DISCHARGE SUMMARY
that he is not currently taking Suboxone and is on oxycodone 30 mg 3 times daily for his pain.  He reports that he goes to the [facility] for his psychiatric care.     Patient states adamantly that he would not like to go to a psych chino like he had been in previously.  He states that it was, \"only old people to talk too much and I all the time.\"  He states that he should not go there because he does not want to hurt them as he just wants to be left alone.     Hospital Course:   Upon admission, Asim Back was provided a safe secure environment, introduced to unit milieu. Patient participated in groups and individual therapies. Meds were adjusted as noted below. After few days of hospital care, patient began to feel improvement. Depression lifted, thoughts to harm self ceased.  Sleep improved, appetite was good. On morning rounds 10/14/2024, Asim Back endorses feeling ready for discharge. Patient denies suicidal or homicidal ideations, denies hallucinations or delusions. Denies SE's from meds.  It was decided that maximum benefit from hospital care had been achieved and patient can be discharged.     Consults:   none    Significant Diagnostic Studies: Routine labs and diagnostics    Treatments: Psychotropic medications, therapy with group, milieu, and 1:1 with nurses, social workers, PAClevelandC/CNP, and Attending physician.      Discharge Medications:  Discharge Medication List as of 10/14/2024 11:45 AM        START taking these medications    Details   ARIPiprazole (ABILIFY) 5 MG tablet Take 1 tablet by mouth nightly, Disp-30 tablet, R-0Normal           CONTINUE these medications which have CHANGED    Details   traZODone (DESYREL) 50 MG tablet Take 1 tablet by mouth nightly as needed for Sleep, Disp-30 tablet, R-0Normal      atorvastatin (LIPITOR) 10 MG tablet Take 1 tablet by mouth nightly, Disp-30 tablet, R-0Normal           CONTINUE these medications which have NOT CHANGED    Details   gabapentin

## 2024-10-14 NOTE — PROGRESS NOTES
Patient is seen in be resting with eyes closed. Woke up and had a large and hard bowel movent. Earlier on he did self cath and voided 650.

## 2024-10-14 NOTE — PLAN OF CARE
Problem: Pain  Goal: Verbalizes/displays adequate comfort level or baseline comfort level  10/13/2024 2237 by Ovi Vital RN  Outcome: Progressing  Note: Medications for pain have been given and patient is in bed resting with eyes closed .   10/13/2024 0900 by Humes, Heather, RN  Outcome: Progressing  Flowsheets (Taken 10/10/2024 0750 by Rain Nraanjo, RN)  Verbalizes/displays adequate comfort level or baseline comfort level: Encourage patient to monitor pain and request assistance  Note: Patient reports pain of a 2/10 with 10 being the worst pain PRN motrin given per MAR with some relief noted.      Problem: Risk for Elopement  Goal: Patient will not exit the unit/facility without proper excort  10/13/2024 2237 by Ovi Vital RN  Outcome: Progressing  Flowsheets (Taken 10/13/2024 2212)  Nursing Interventions for Elopement Risk: Escort with two staff members if patient must leave the unit  Note: Patient has not sort exit since he has been on admission .   10/13/2024 0900 by Humes, Heather, RN  Outcome: Progressing  Flowsheets (Taken 10/12/2024 2146 by Ovi Vital, RN)  Nursing Interventions for Elopement Risk: Assist with personal care needs such as toileting, eating, dressing, as needed to reduce the risk of wandering  Note: No attempts to elope so far this shift.      Problem: Safety - Adult  Goal: Free from fall injury  10/13/2024 2237 by Ovi Vital RN  Outcome: Progressing  Note: Patient will be free of self harm and injury during hospital stay.  10/13/2024 0900 by Humes, Heather, RN  Outcome: Progressing  Flowsheets (Taken 10/5/2024 0040 by Nadege Byrne, RN)  Free From Fall Injury: Instruct family/caregiver on patient safety  Note: Patient free from falls so far this shift.      Problem: Anxiety  Goal: Will report anxiety at manageable levels  Description: INTERVENTIONS:  1. Administer medication as ordered  2. Teach and rehearse alternative coping

## 2024-10-14 NOTE — DISCHARGE INSTRUCTIONS
[facility] Hotline:  [phone]    Crisis phone numbers:  [facility], and [facility] [place] [phone].  [facility], and [facility] [phone]  [facility] [phone].  [place] [facility] and [facility] [phone].  [facility] [phone].  [facility], [facility] [phone].    [facility]  [address], [place] [address]  [phone]    [facility] [place] [facility]  [address] [facility]  [address] [place], [place] [address]  [place], [place] [id number] [phone]    [facility]  [address] [address] [address] [facility] [place] [address]  [phone]    [facility]  [address] [place] [address]  [phone]    [facility]  [address] [place] [address]  [phone]    [facility]  [address], [place] [id number] [phone]    [facility]  [address] [place] [address]  [phone]    [facility]  Recovery and Wellness Center  [address], [place] [id number] [phone]    [facility]  [address] [address]  [phone]    [address] [address]  [phone]    [facility]  [address] [place], [place] [address] [phone]    [facility]  [address] [place] [address]  [phone]    [facility]  [address] [place], [place] [address] [phone]

## 2024-10-14 NOTE — DISCHARGE INSTR - DIET
Good nutrition is important when healing from an illness, injury, or surgery.  Follow any nutrition recommendations given to you during your hospital stay.   If you were given an oral nutrition supplement while in the hospital, continue to take this supplement at home.  You can take it with meals, in-between meals, and/or before bedtime. These supplements can be purchased at most local grocery stores, pharmacies, and chain [employer]-stores.   If you have any questions about your diet or nutrition, call the hospital and ask for the dietitian.    Regular diet

## 2024-10-14 NOTE — BH NOTE
Behavioral Health   Discharge Note    Pt discharged with followings belongings:   Dental Appliances: None  Vision - Corrective Lenses: None  Hearing Aid: None  Jewelry: None  Body Piercings Removed: No  Clothing: Shirt, Socks, Undergarments  Other Valuables: Money, Other (Comment), Keys ($159.00    [id number])   Valuables retrieved from safe, security envelope number:  na and returned to patient.  Patient left department with self via cab.  Discharged to home. \"An Important Message from Medicare About Your Rights\" (IMM) form photocopy original from admission and provided to pt at least 4 hours prior to discharge yes. \"An Important Message from [employer] About Your Rights\" (IMM) form photocopy original from admission. N/A. If pt left within 4 hours of receiving 2nd delivery of IMM, this is because pt was agreeable with hospital discharge.  Patient/guardian education on aftercare instructions: Yes  Bridge appointment completed:  yes.  Reviewed Discharge Instructions with patient/family/nursing facility.  Patient/family verbalizes understanding and agreement with the discharge plan using the teachback method. Patient/family verbalize understanding of AVS:Yes    Status EXAM upon discharge:  Mental Status and Behavioral Exam  Normal: No  Level of Assistance: Independent/Self  Facial Expression: Flat  Affect: Blunt  Level of Consciousness: Alert  Frequency of Checks: 4 times per hour, close  Mood:Normal: No  Mood: Depressed, Anxious  Motor Activity:Normal: No  Motor Activity: Decreased  Eye Contact: Good  Observed Behavior: Cooperative  Sexual Misconduct History: Current - no  Involved In Any Sexual Misconduct With Others? : No  History of Sexually Inappropriate Behavior When Previously Hospitalized?: No  Uncontrollable/Compulsive Masturbation?: No  Difficulty Controlling Sexual Impulses?: No  Preception: [place] to person, [place] to time, [place] to place, [place] to situation  Attention:Normal: Yes  Attention:

## 2024-10-14 NOTE — TRANSITION OF CARE
Behavioral Health Transition Record    Patient Name: Asim Back  YOB: 1970   Medical Record Number: [id number]  Date of Admission: 10/3/2024  7:46 PM   Date of Discharge: 10/14/24    Attending Provider: Nacho Rothman,*   Discharging Provider: Dr. Rothman  To contact this individual call [phone] opt [phone] and ask the  to page.  If unavailable, ask to be transferred to Behavioral Health Provider on call.  A Behavioral Health Provider will be available on call 24/7 and during holidays.    Primary Care Provider: No primary care provider on file.    Allergies   Allergen Reactions    Lisinopril     Penicillins        Reason for Admission: MDD    Admission Diagnosis: Suicidal ideation [R45.851]  MDD (major depressive disorder), recurrent severe, without psychosis (HCC) [F33.2]    * No surgery found *    Results for orders placed or performed during the hospital encounter of 10/03/24   Culture, Reflexed, Urine    Specimen: Urine, clean catch   Result Value Ref Range    Organism Enterobacter cloacae complex (A)     Urine Culture Reflex       [place] count: >100,000 CFU/mL Enterobacter species which may be initially susceptible to third generation cephalosporins may develop resistance within three to four days of initiation of this antimicrobial therapy.       Susceptibility    Enterobacter cloacae complex - BACTERIAL SUSCEPTIBILITY PANEL BY ALIYA     cefepime <=1 Sensitive mcg/mL     gentamicin <=1 Sensitive mcg/mL     piperacillin-tazobactam <=4 Sensitive mcg/mL     ciprofloxacin 0.5 Sensitive mcg/mL     trimethoprim-sulfamethoxazole <=20 Sensitive mcg/mL     levofloxacin 1 Sensitive mcg/mL     nitrofurantoin 128 Resistant mcg/mL   Culture, Blood 1    Specimen: Blood   Result Value Ref Range    Blood Culture, Routine No growth 24 hours. No growth 48 hours.    Culture, Blood 2    Specimen: Blood   Result Value Ref Range    Blood Culture, Routine No growth 24 hours. No growth 48 hours.

## 2024-10-15 LAB
BACTERIA BLD AEROBE CULT: NORMAL
BACTERIA BLD AEROBE CULT: NORMAL

## 2024-11-13 RX ORDER — ARIPIPRAZOLE 5 MG/1
5 TABLET ORAL NIGHTLY
Qty: 30 TABLET | Refills: 0 | OUTPATIENT
Start: 2024-11-13

## 2024-11-13 RX ORDER — TRAZODONE HYDROCHLORIDE 50 MG/1
50 TABLET, FILM COATED ORAL NIGHTLY PRN
Qty: 30 TABLET | Refills: 0 | OUTPATIENT
Start: 2024-11-13

## 2024-12-14 ENCOUNTER — APPOINTMENT (OUTPATIENT)
Dept: GENERAL RADIOLOGY | Age: 54
DRG: 871 | End: 2024-12-14
Payer: MEDICARE

## 2024-12-14 ENCOUNTER — HOSPITAL ENCOUNTER (INPATIENT)
Age: 54
LOS: 2 days | Discharge: HOME | DRG: 871 | End: 2024-12-17
Attending: EMERGENCY MEDICINE | Admitting: STUDENT IN AN ORGANIZED HEALTH CARE EDUCATION/TRAINING PROGRAM
Payer: MEDICARE

## 2024-12-14 DIAGNOSIS — R11.2 NAUSEA VOMITING AND DIARRHEA: ICD-10-CM

## 2024-12-14 DIAGNOSIS — J43.9 LUNG BULLAE (HCC): ICD-10-CM

## 2024-12-14 DIAGNOSIS — G82.20 PARAPLEGIA AT T9 LEVEL (HCC): ICD-10-CM

## 2024-12-14 DIAGNOSIS — J44.9 CHRONIC OBSTRUCTIVE PULMONARY DISEASE, UNSPECIFIED COPD TYPE (HCC): ICD-10-CM

## 2024-12-14 DIAGNOSIS — M54.9 CHRONIC BACK PAIN, UNSPECIFIED BACK LOCATION, UNSPECIFIED BACK PAIN LATERALITY: ICD-10-CM

## 2024-12-14 DIAGNOSIS — E83.42 HYPOMAGNESEMIA: ICD-10-CM

## 2024-12-14 DIAGNOSIS — L89.629 DECUBITUS ULCER OF HEEL, BILATERAL: ICD-10-CM

## 2024-12-14 DIAGNOSIS — G89.29 CHRONIC BACK PAIN, UNSPECIFIED BACK LOCATION, UNSPECIFIED BACK PAIN LATERALITY: ICD-10-CM

## 2024-12-14 DIAGNOSIS — J18.9 COMMUNITY ACQUIRED PNEUMONIA OF RIGHT LUNG, UNSPECIFIED PART OF LUNG: Primary | ICD-10-CM

## 2024-12-14 DIAGNOSIS — L89.619 DECUBITUS ULCER OF HEEL, BILATERAL: ICD-10-CM

## 2024-12-14 DIAGNOSIS — R19.7 NAUSEA VOMITING AND DIARRHEA: ICD-10-CM

## 2024-12-14 DIAGNOSIS — D72.829 LEUKOCYTOSIS, UNSPECIFIED TYPE: ICD-10-CM

## 2024-12-14 LAB
ALBUMIN SERPL BCG-MCNC: 3.9 G/DL (ref 3.5–5.1)
ALP SERPL-CCNC: 62 U/L (ref 38–126)
ALT SERPL W/O P-5'-P-CCNC: 16 U/L (ref 11–66)
AMPHETAMINES UR QL SCN: NEGATIVE
ANION GAP SERPL CALC-SCNC: 12 MEQ/L (ref 8–16)
AST SERPL-CCNC: 13 U/L (ref 5–40)
BARBITURATES UR QL SCN: NEGATIVE
BASOPHILS ABSOLUTE: 0 THOU/MM3 (ref 0–0.1)
BASOPHILS NFR BLD AUTO: 0.2 %
BENZODIAZ UR QL SCN: NEGATIVE
BILIRUB SERPL-MCNC: 0.2 MG/DL (ref 0.3–1.2)
BILIRUB UR QL STRIP.AUTO: NEGATIVE
BUN SERPL-MCNC: 17 MG/DL (ref 7–22)
BZE UR QL SCN: NEGATIVE
CALCIUM SERPL-MCNC: 8.9 MG/DL (ref 8.5–10.5)
CANNABINOIDS UR QL SCN: POSITIVE
CHARACTER UR: CLEAR
CHLORIDE SERPL-SCNC: 103 MEQ/L (ref 98–111)
CO2 SERPL-SCNC: 22 MEQ/L (ref 23–33)
COLOR, UA: YELLOW
CREAT SERPL-MCNC: 0.9 MG/DL (ref 0.4–1.2)
DEPRECATED RDW RBC AUTO: 50 FL (ref 35–45)
EKG ATRIAL RATE: 133 BPM
EKG P-R INTERVAL: 96 MS
EKG Q-T INTERVAL: 386 MS
EKG QRS DURATION: 82 MS
EKG QTC CALCULATION (BAZETT): 574 MS
EKG R AXIS: 62 DEGREES
EKG T AXIS: 83 DEGREES
EKG VENTRICULAR RATE: 133 BPM
EOSINOPHIL NFR BLD AUTO: 0 %
EOSINOPHILS ABSOLUTE: 0 THOU/MM3 (ref 0–0.4)
ERYTHROCYTE [DISTWIDTH] IN BLOOD BY AUTOMATED COUNT: 14.5 % (ref 11.5–14.5)
FENTANYL: NEGATIVE
FLUAV RNA RESP QL NAA+PROBE: NOT DETECTED
FLUBV RNA RESP QL NAA+PROBE: NOT DETECTED
GFR SERPL CREATININE-BSD FRML MDRD: > 90 ML/MIN/1.73M2
GLUCOSE SERPL-MCNC: 91 MG/DL (ref 70–108)
GLUCOSE UR QL STRIP.AUTO: NEGATIVE MG/DL
HCT VFR BLD AUTO: 37.3 % (ref 42–52)
HGB BLD-MCNC: 12.4 GM/DL (ref 14–18)
HGB UR QL STRIP.AUTO: NEGATIVE
IMM GRANULOCYTES # BLD AUTO: 0.22 THOU/MM3 (ref 0–0.07)
IMM GRANULOCYTES NFR BLD AUTO: 0.9 %
KETONES UR QL STRIP.AUTO: NEGATIVE
LACTATE SERPL-SCNC: 2.7 MMOL/L (ref 0.5–2)
LYMPHOCYTES ABSOLUTE: 0.9 THOU/MM3 (ref 1–4.8)
LYMPHOCYTES NFR BLD AUTO: 3.9 %
MAGNESIUM SERPL-MCNC: 1.5 MG/DL (ref 1.6–2.4)
MCH RBC QN AUTO: 31.4 PG (ref 26–33)
MCHC RBC AUTO-ENTMCNC: 33.2 GM/DL (ref 32.2–35.5)
MCV RBC AUTO: 94.4 FL (ref 80–94)
MONOCYTES ABSOLUTE: 0.8 THOU/MM3 (ref 0.4–1.3)
MONOCYTES NFR BLD AUTO: 3.4 %
NEUTROPHILS ABSOLUTE: 22.1 THOU/MM3 (ref 1.8–7.7)
NEUTROPHILS NFR BLD AUTO: 91.6 %
NITRITE UR QL STRIP: NEGATIVE
NRBC BLD AUTO-RTO: 0 /100 WBC
OPIATES UR QL SCN: NEGATIVE
OSMOLALITY SERPL CALC.SUM OF ELEC: 274.9 MOSMOL/KG (ref 275–300)
OXYCODONE: NEGATIVE
PCP UR QL SCN: NEGATIVE
PH UR STRIP.AUTO: 5.5 [PH] (ref 5–9)
PLATELET # BLD AUTO: 254 THOU/MM3 (ref 130–400)
PMV BLD AUTO: 9 FL (ref 9.4–12.4)
POTASSIUM SERPL-SCNC: 3.8 MEQ/L (ref 3.5–5.2)
PROT SERPL-MCNC: 7 G/DL (ref 6.1–8)
PROT UR STRIP.AUTO-MCNC: NEGATIVE MG/DL
RBC # BLD AUTO: 3.95 MILL/MM3 (ref 4.7–6.1)
SARS-COV-2 RNA RESP QL NAA+PROBE: NOT DETECTED
SODIUM SERPL-SCNC: 137 MEQ/L (ref 135–145)
SP GR UR REFRACT.AUTO: 1.01 (ref 1–1.03)
UROBILINOGEN, URINE: 0.2 EU/DL (ref 0–1)
WBC # BLD AUTO: 24.1 THOU/MM3 (ref 4.8–10.8)
WBC #/AREA URNS HPF: NEGATIVE /[HPF]

## 2024-12-14 PROCEDURE — 99285 EMERGENCY DEPT VISIT HI MDM: CPT

## 2024-12-14 PROCEDURE — 93005 ELECTROCARDIOGRAM TRACING: CPT | Performed by: EMERGENCY MEDICINE

## 2024-12-14 PROCEDURE — 80307 DRUG TEST PRSMV CHEM ANLYZR: CPT

## 2024-12-14 PROCEDURE — 2580000003 HC RX 258: Performed by: EMERGENCY MEDICINE

## 2024-12-14 PROCEDURE — 36415 COLL VENOUS BLD VENIPUNCTURE: CPT

## 2024-12-14 PROCEDURE — 83735 ASSAY OF MAGNESIUM: CPT

## 2024-12-14 PROCEDURE — 6370000000 HC RX 637 (ALT 250 FOR IP)

## 2024-12-14 PROCEDURE — 87040 BLOOD CULTURE FOR BACTERIA: CPT

## 2024-12-14 PROCEDURE — 96366 THER/PROPH/DIAG IV INF ADDON: CPT

## 2024-12-14 PROCEDURE — 81003 URINALYSIS AUTO W/O SCOPE: CPT

## 2024-12-14 PROCEDURE — 87636 SARSCOV2 & INF A&B AMP PRB: CPT

## 2024-12-14 PROCEDURE — 96375 TX/PRO/DX INJ NEW DRUG ADDON: CPT

## 2024-12-14 PROCEDURE — 71046 X-RAY EXAM CHEST 2 VIEWS: CPT

## 2024-12-14 PROCEDURE — G0378 HOSPITAL OBSERVATION PER HR: HCPCS

## 2024-12-14 PROCEDURE — 96365 THER/PROPH/DIAG IV INF INIT: CPT

## 2024-12-14 PROCEDURE — 80053 COMPREHEN METABOLIC PANEL: CPT

## 2024-12-14 PROCEDURE — 85025 COMPLETE CBC W/AUTO DIFF WBC: CPT

## 2024-12-14 PROCEDURE — 83605 ASSAY OF LACTIC ACID: CPT

## 2024-12-14 PROCEDURE — 6370000000 HC RX 637 (ALT 250 FOR IP): Performed by: EMERGENCY MEDICINE

## 2024-12-14 PROCEDURE — 6360000002 HC RX W HCPCS: Performed by: EMERGENCY MEDICINE

## 2024-12-14 PROCEDURE — 84145 PROCALCITONIN (PCT): CPT

## 2024-12-14 PROCEDURE — 93010 ELECTROCARDIOGRAM REPORT: CPT | Performed by: NUCLEAR MEDICINE

## 2024-12-14 RX ORDER — SODIUM CHLORIDE 9 MG/ML
INJECTION, SOLUTION INTRAVENOUS CONTINUOUS
Status: DISCONTINUED | OUTPATIENT
Start: 2024-12-14 | End: 2024-12-16

## 2024-12-14 RX ORDER — MAGNESIUM SULFATE IN WATER 40 MG/ML
2000 INJECTION, SOLUTION INTRAVENOUS ONCE
Status: COMPLETED | OUTPATIENT
Start: 2024-12-14 | End: 2024-12-14

## 2024-12-14 RX ORDER — ACETAMINOPHEN 650 MG/1
650 SUPPOSITORY RECTAL EVERY 6 HOURS PRN
Status: DISCONTINUED | OUTPATIENT
Start: 2024-12-14 | End: 2024-12-16

## 2024-12-14 RX ORDER — ENOXAPARIN SODIUM 100 MG/ML
40 INJECTION SUBCUTANEOUS DAILY
Status: DISCONTINUED | OUTPATIENT
Start: 2024-12-15 | End: 2024-12-17 | Stop reason: HOSPADM

## 2024-12-14 RX ORDER — OXYCODONE HYDROCHLORIDE 15 MG/1
30 TABLET ORAL EVERY 6 HOURS PRN
Status: DISCONTINUED | OUTPATIENT
Start: 2024-12-14 | End: 2024-12-16

## 2024-12-14 RX ORDER — TRAZODONE HYDROCHLORIDE 50 MG/1
50 TABLET, FILM COATED ORAL NIGHTLY PRN
Status: DISCONTINUED | OUTPATIENT
Start: 2024-12-14 | End: 2024-12-17 | Stop reason: HOSPADM

## 2024-12-14 RX ORDER — OXYCODONE AND ACETAMINOPHEN 5; 325 MG/1; MG/1
2 TABLET ORAL ONCE
Status: COMPLETED | OUTPATIENT
Start: 2024-12-14 | End: 2024-12-14

## 2024-12-14 RX ORDER — POTASSIUM CHLORIDE 1500 MG/1
40 TABLET, EXTENDED RELEASE ORAL PRN
Status: DISCONTINUED | OUTPATIENT
Start: 2024-12-14 | End: 2024-12-17 | Stop reason: HOSPADM

## 2024-12-14 RX ORDER — AZITHROMYCIN 250 MG/1
500 TABLET, FILM COATED ORAL DAILY
Status: DISCONTINUED | OUTPATIENT
Start: 2024-12-15 | End: 2024-12-14

## 2024-12-14 RX ORDER — ASPIRIN 81 MG/1
81 TABLET, CHEWABLE ORAL DAILY
Status: DISCONTINUED | OUTPATIENT
Start: 2024-12-15 | End: 2024-12-17 | Stop reason: HOSPADM

## 2024-12-14 RX ORDER — 0.9 % SODIUM CHLORIDE 0.9 %
500 INTRAVENOUS SOLUTION INTRAVENOUS ONCE
Status: COMPLETED | OUTPATIENT
Start: 2024-12-14 | End: 2024-12-14

## 2024-12-14 RX ORDER — ACETAMINOPHEN 325 MG/1
650 TABLET ORAL EVERY 6 HOURS PRN
Status: DISCONTINUED | OUTPATIENT
Start: 2024-12-14 | End: 2024-12-16

## 2024-12-14 RX ORDER — POLYETHYLENE GLYCOL 3350 17 G/17G
17 POWDER, FOR SOLUTION ORAL DAILY PRN
Status: DISCONTINUED | OUTPATIENT
Start: 2024-12-14 | End: 2024-12-17 | Stop reason: HOSPADM

## 2024-12-14 RX ORDER — TAMSULOSIN HYDROCHLORIDE 0.4 MG/1
0.4 CAPSULE ORAL DAILY
Status: DISCONTINUED | OUTPATIENT
Start: 2024-12-15 | End: 2024-12-17 | Stop reason: HOSPADM

## 2024-12-14 RX ORDER — FOLIC ACID 1 MG/1
500 TABLET ORAL DAILY
Status: DISCONTINUED | OUTPATIENT
Start: 2024-12-15 | End: 2024-12-17 | Stop reason: HOSPADM

## 2024-12-14 RX ORDER — AMLODIPINE BESYLATE 10 MG/1
10 TABLET ORAL DAILY
Status: DISCONTINUED | OUTPATIENT
Start: 2024-12-15 | End: 2024-12-17 | Stop reason: HOSPADM

## 2024-12-14 RX ORDER — SODIUM CHLORIDE 0.9 % (FLUSH) 0.9 %
5-40 SYRINGE (ML) INJECTION PRN
Status: DISCONTINUED | OUTPATIENT
Start: 2024-12-14 | End: 2024-12-17 | Stop reason: HOSPADM

## 2024-12-14 RX ORDER — SODIUM CHLORIDE 0.9 % (FLUSH) 0.9 %
5-40 SYRINGE (ML) INJECTION EVERY 12 HOURS SCHEDULED
Status: DISCONTINUED | OUTPATIENT
Start: 2024-12-14 | End: 2024-12-17 | Stop reason: HOSPADM

## 2024-12-14 RX ORDER — POTASSIUM CHLORIDE 7.45 MG/ML
10 INJECTION INTRAVENOUS PRN
Status: DISCONTINUED | OUTPATIENT
Start: 2024-12-14 | End: 2024-12-17 | Stop reason: HOSPADM

## 2024-12-14 RX ORDER — GABAPENTIN 400 MG/1
800 CAPSULE ORAL 3 TIMES DAILY
Status: DISCONTINUED | OUTPATIENT
Start: 2024-12-14 | End: 2024-12-17 | Stop reason: HOSPADM

## 2024-12-14 RX ORDER — ATORVASTATIN CALCIUM 10 MG/1
10 TABLET, FILM COATED ORAL NIGHTLY
Status: DISCONTINUED | OUTPATIENT
Start: 2024-12-14 | End: 2024-12-17 | Stop reason: HOSPADM

## 2024-12-14 RX ORDER — SODIUM CHLORIDE 9 MG/ML
INJECTION, SOLUTION INTRAVENOUS PRN
Status: DISCONTINUED | OUTPATIENT
Start: 2024-12-14 | End: 2024-12-17 | Stop reason: HOSPADM

## 2024-12-14 RX ORDER — ONDANSETRON 4 MG/1
4 TABLET, ORALLY DISINTEGRATING ORAL EVERY 8 HOURS PRN
Status: DISCONTINUED | OUTPATIENT
Start: 2024-12-14 | End: 2024-12-17 | Stop reason: HOSPADM

## 2024-12-14 RX ORDER — CYCLOBENZAPRINE HCL 10 MG
10 TABLET ORAL 3 TIMES DAILY PRN
Status: DISCONTINUED | OUTPATIENT
Start: 2024-12-14 | End: 2024-12-17 | Stop reason: HOSPADM

## 2024-12-14 RX ORDER — ONDANSETRON 2 MG/ML
4 INJECTION INTRAMUSCULAR; INTRAVENOUS EVERY 6 HOURS PRN
Status: DISCONTINUED | OUTPATIENT
Start: 2024-12-14 | End: 2024-12-17 | Stop reason: HOSPADM

## 2024-12-14 RX ORDER — PANTOPRAZOLE SODIUM 40 MG/1
40 TABLET, DELAYED RELEASE ORAL
Status: DISCONTINUED | OUTPATIENT
Start: 2024-12-15 | End: 2024-12-17 | Stop reason: HOSPADM

## 2024-12-14 RX ORDER — MAGNESIUM SULFATE IN WATER 40 MG/ML
2000 INJECTION, SOLUTION INTRAVENOUS PRN
Status: DISCONTINUED | OUTPATIENT
Start: 2024-12-14 | End: 2024-12-17 | Stop reason: HOSPADM

## 2024-12-14 RX ORDER — ARIPIPRAZOLE 5 MG/1
5 TABLET ORAL NIGHTLY
Status: DISCONTINUED | OUTPATIENT
Start: 2024-12-14 | End: 2024-12-17 | Stop reason: HOSPADM

## 2024-12-14 RX ORDER — DOXYCYCLINE HYCLATE 100 MG
100 TABLET ORAL EVERY 12 HOURS SCHEDULED
Status: DISCONTINUED | OUTPATIENT
Start: 2024-12-14 | End: 2024-12-17

## 2024-12-14 RX ADMIN — WATER 1000 MG: 1 INJECTION INTRAMUSCULAR; INTRAVENOUS; SUBCUTANEOUS at 21:17

## 2024-12-14 RX ADMIN — OXYCODONE HYDROCHLORIDE AND ACETAMINOPHEN 2 TABLET: 5; 325 TABLET ORAL at 19:27

## 2024-12-14 RX ADMIN — MAGNESIUM SULFATE HEPTAHYDRATE 2000 MG: 40 INJECTION, SOLUTION INTRAVENOUS at 21:08

## 2024-12-14 RX ADMIN — SODIUM CHLORIDE: 9 INJECTION, SOLUTION INTRAVENOUS at 22:30

## 2024-12-14 RX ADMIN — SODIUM CHLORIDE 500 ML: 9 INJECTION, SOLUTION INTRAVENOUS at 21:08

## 2024-12-14 RX ADMIN — OXYCODONE HYDROCHLORIDE 30 MG: 15 TABLET ORAL at 23:32

## 2024-12-14 ASSESSMENT — PAIN DESCRIPTION - LOCATION: LOCATION: BACK;LEG

## 2024-12-14 ASSESSMENT — PAIN DESCRIPTION - ORIENTATION: ORIENTATION: RIGHT;LEFT

## 2024-12-14 ASSESSMENT — PAIN SCALES - GENERAL
PAINLEVEL_OUTOF10: 10
PAINLEVEL_OUTOF10: 7
PAINLEVEL_OUTOF10: 10

## 2024-12-14 ASSESSMENT — PAIN - FUNCTIONAL ASSESSMENT
PAIN_FUNCTIONAL_ASSESSMENT: 0-10

## 2024-12-14 NOTE — ED NOTES
Pt to ED via [facility] c/o back, neck, and leg. Pt states he has chronic pain and takes oxycodone 30 mg for pain. Pt states he woke up from 4 hour nap and his pain was unbearable. Pt states he has been out of his pain meds because he cannot get them refilled unless seen by the provider. Pt states he was sick this past week and unable to go to the doctor. Pt denies any chest pain or SOB. Pt tachycardiac, EKG completed.

## 2024-12-15 LAB
ANION GAP SERPL CALC-SCNC: 12 MEQ/L (ref 8–16)
BUN SERPL-MCNC: 17 MG/DL (ref 7–22)
CALCIUM SERPL-MCNC: 8.5 MG/DL (ref 8.5–10.5)
CHLORIDE SERPL-SCNC: 104 MEQ/L (ref 98–111)
CO2 SERPL-SCNC: 21 MEQ/L (ref 23–33)
CREAT SERPL-MCNC: 1 MG/DL (ref 0.4–1.2)
DEPRECATED RDW RBC AUTO: 51.4 FL (ref 35–45)
ERYTHROCYTE [DISTWIDTH] IN BLOOD BY AUTOMATED COUNT: 14.6 % (ref 11.5–14.5)
GFR SERPL CREATININE-BSD FRML MDRD: 89 ML/MIN/1.73M2
GLUCOSE SERPL-MCNC: 100 MG/DL (ref 70–108)
HCT VFR BLD AUTO: 33.9 % (ref 42–52)
HGB BLD-MCNC: 11.2 GM/DL (ref 14–18)
L PNEUMO1 AG UR QL IA.RAPID: NEGATIVE
LACTATE SERPL-SCNC: 1.8 MMOL/L (ref 0.5–2)
LACTATE SERPL-SCNC: 2 MMOL/L (ref 0.5–2)
LACTATE SERPL-SCNC: 3.1 MMOL/L (ref 0.5–2)
MAGNESIUM SERPL-MCNC: 2.1 MG/DL (ref 1.6–2.4)
MCH RBC QN AUTO: 31.6 PG (ref 26–33)
MCHC RBC AUTO-ENTMCNC: 33 GM/DL (ref 32.2–35.5)
MCV RBC AUTO: 95.8 FL (ref 80–94)
PLATELET # BLD AUTO: 234 THOU/MM3 (ref 130–400)
PMV BLD AUTO: 9 FL (ref 9.4–12.4)
POTASSIUM SERPL-SCNC: 4.2 MEQ/L (ref 3.5–5.2)
PROCALCITONIN SERPL IA-MCNC: 6.56 NG/ML (ref 0.01–0.09)
RBC # BLD AUTO: 3.54 MILL/MM3 (ref 4.7–6.1)
SODIUM SERPL-SCNC: 137 MEQ/L (ref 135–145)
STREP PNEUMO AG, UR: NEGATIVE
WBC # BLD AUTO: 16.4 THOU/MM3 (ref 4.8–10.8)

## 2024-12-15 PROCEDURE — 80048 BASIC METABOLIC PNL TOTAL CA: CPT

## 2024-12-15 PROCEDURE — 83605 ASSAY OF LACTIC ACID: CPT

## 2024-12-15 PROCEDURE — 6360000002 HC RX W HCPCS

## 2024-12-15 PROCEDURE — 94669 MECHANICAL CHEST WALL OSCILL: CPT

## 2024-12-15 PROCEDURE — 94760 N-INVAS EAR/PLS OXIMETRY 1: CPT

## 2024-12-15 PROCEDURE — 85027 COMPLETE CBC AUTOMATED: CPT

## 2024-12-15 PROCEDURE — 36415 COLL VENOUS BLD VENIPUNCTURE: CPT

## 2024-12-15 PROCEDURE — 51701 INSERT BLADDER CATHETER: CPT

## 2024-12-15 PROCEDURE — 6370000000 HC RX 637 (ALT 250 FOR IP): Performed by: INTERNAL MEDICINE

## 2024-12-15 PROCEDURE — 87449 NOS EACH ORGANISM AG IA: CPT

## 2024-12-15 PROCEDURE — 2580000003 HC RX 258

## 2024-12-15 PROCEDURE — 87899 AGENT NOS ASSAY W/OPTIC: CPT

## 2024-12-15 PROCEDURE — 6370000000 HC RX 637 (ALT 250 FOR IP)

## 2024-12-15 PROCEDURE — 1200000003 HC TELEMETRY R&B

## 2024-12-15 PROCEDURE — 2500000003 HC RX 250 WO HCPCS

## 2024-12-15 PROCEDURE — 83735 ASSAY OF MAGNESIUM: CPT

## 2024-12-15 RX ORDER — IPRATROPIUM BROMIDE AND ALBUTEROL SULFATE 2.5; .5 MG/3ML; MG/3ML
1 SOLUTION RESPIRATORY (INHALATION) 4 TIMES DAILY PRN
Status: DISCONTINUED | OUTPATIENT
Start: 2024-12-15 | End: 2024-12-17 | Stop reason: HOSPADM

## 2024-12-15 RX ORDER — 0.9 % SODIUM CHLORIDE 0.9 %
1000 INTRAVENOUS SOLUTION INTRAVENOUS ONCE
Status: COMPLETED | OUTPATIENT
Start: 2024-12-15 | End: 2024-12-15

## 2024-12-15 RX ORDER — NICOTINE 21 MG/24HR
1 PATCH, TRANSDERMAL 24 HOURS TRANSDERMAL DAILY
Status: DISCONTINUED | OUTPATIENT
Start: 2024-12-15 | End: 2024-12-17 | Stop reason: HOSPADM

## 2024-12-15 RX ORDER — GUAIFENESIN 200 MG/10ML
200 LIQUID ORAL EVERY 4 HOURS PRN
Status: DISCONTINUED | OUTPATIENT
Start: 2024-12-15 | End: 2024-12-17 | Stop reason: HOSPADM

## 2024-12-15 RX ADMIN — GABAPENTIN 800 MG: 400 CAPSULE ORAL at 21:33

## 2024-12-15 RX ADMIN — ATORVASTATIN CALCIUM 10 MG: 10 TABLET, FILM COATED ORAL at 19:50

## 2024-12-15 RX ADMIN — DULOXETINE HYDROCHLORIDE 90 MG: 60 CAPSULE, DELAYED RELEASE ORAL at 08:08

## 2024-12-15 RX ADMIN — WATER 1000 MG: 1 INJECTION INTRAMUSCULAR; INTRAVENOUS; SUBCUTANEOUS at 21:34

## 2024-12-15 RX ADMIN — ENOXAPARIN SODIUM 40 MG: 100 INJECTION SUBCUTANEOUS at 08:08

## 2024-12-15 RX ADMIN — SODIUM CHLORIDE, PRESERVATIVE FREE 10 ML: 5 INJECTION INTRAVENOUS at 08:09

## 2024-12-15 RX ADMIN — DOXYCYCLINE HYCLATE 100 MG: 100 TABLET, COATED ORAL at 05:22

## 2024-12-15 RX ADMIN — GABAPENTIN 800 MG: 400 CAPSULE ORAL at 08:08

## 2024-12-15 RX ADMIN — ARIPIPRAZOLE 5 MG: 5 TABLET ORAL at 19:50

## 2024-12-15 RX ADMIN — CYCLOBENZAPRINE 10 MG: 10 TABLET, FILM COATED ORAL at 19:50

## 2024-12-15 RX ADMIN — OXYCODONE HYDROCHLORIDE 30 MG: 15 TABLET ORAL at 23:37

## 2024-12-15 RX ADMIN — OXYCODONE HYDROCHLORIDE 30 MG: 15 TABLET ORAL at 11:30

## 2024-12-15 RX ADMIN — OXYCODONE HYDROCHLORIDE 30 MG: 15 TABLET ORAL at 05:22

## 2024-12-15 RX ADMIN — DOXYCYCLINE HYCLATE 100 MG: 100 TABLET, COATED ORAL at 17:17

## 2024-12-15 RX ADMIN — TAMSULOSIN HYDROCHLORIDE 0.4 MG: 0.4 CAPSULE ORAL at 08:08

## 2024-12-15 RX ADMIN — OXYCODONE HYDROCHLORIDE 30 MG: 15 TABLET ORAL at 17:17

## 2024-12-15 RX ADMIN — GABAPENTIN 800 MG: 400 CAPSULE ORAL at 14:08

## 2024-12-15 RX ADMIN — SODIUM CHLORIDE, PRESERVATIVE FREE 10 ML: 5 INJECTION INTRAVENOUS at 21:33

## 2024-12-15 RX ADMIN — GUAIFENESIN 200 MG: 200 SOLUTION ORAL at 06:22

## 2024-12-15 RX ADMIN — ASPIRIN 81 MG 81 MG: 81 TABLET ORAL at 08:09

## 2024-12-15 RX ADMIN — SODIUM CHLORIDE 1000 ML: 9 INJECTION, SOLUTION INTRAVENOUS at 02:42

## 2024-12-15 RX ADMIN — PANTOPRAZOLE SODIUM 40 MG: 40 TABLET, DELAYED RELEASE ORAL at 05:22

## 2024-12-15 RX ADMIN — FOLIC ACID 500 MCG: 1 TABLET ORAL at 08:09

## 2024-12-15 ASSESSMENT — PAIN SCALES - GENERAL
PAINLEVEL_OUTOF10: 10
PAINLEVEL_OUTOF10: 9
PAINLEVEL_OUTOF10: 7
PAINLEVEL_OUTOF10: 10
PAINLEVEL_OUTOF10: 10
PAINLEVEL_OUTOF10: 9
PAINLEVEL_OUTOF10: 6
PAINLEVEL_OUTOF10: 9
PAINLEVEL_OUTOF10: 6

## 2024-12-15 ASSESSMENT — PAIN DESCRIPTION - LOCATION
LOCATION: BACK;FOOT
LOCATION: FOOT
LOCATION: FOOT

## 2024-12-15 ASSESSMENT — PAIN DESCRIPTION - DESCRIPTORS
DESCRIPTORS: ACHING
DESCRIPTORS: ACHING

## 2024-12-15 ASSESSMENT — PAIN DESCRIPTION - ORIENTATION
ORIENTATION: LEFT;RIGHT
ORIENTATION: LEFT;RIGHT

## 2024-12-15 NOTE — H&P
Tachycardic, regular rhythm with normal S1/S2 without murmurs.  No lower extremity edema.   Abdomen: Soft, non-tender, non-distended with normal bowel sounds.  Musculoskeletal/skin: Bilateral toe amputations with nonhealing chronic wounds  Neurologic: Paraplegic from T9  Psychiatric: Alert and oriented, normal insight and thought content.       Medications Prior to Admission:   Prior to Admission Medications   Prescriptions Last Dose Informant Patient Reported? Taking?   ARIPiprazole (ABILIFY) 5 MG tablet   No No   Sig: Take 1 tablet by mouth nightly   DULoxetine (CYMBALTA) 30 MG extended release capsule   Yes No   Sig: Take 3 capsules by mouth daily DR capsule   amLODIPine (NORVASC) 10 MG tablet   Yes No   Sig: Take 1 tablet by mouth daily   aspirin 81 MG chewable tablet   Yes No   Sig: Take 1 tablet by mouth daily   atorvastatin (LIPITOR) 10 MG tablet   No No   Sig: Take 1 tablet by mouth nightly   cyclobenzaprine (FLEXERIL) 10 MG tablet   Yes No   Sig: Take 1 tablet by mouth 3 times daily as needed for Muscle spasms   docusate sodium (COLACE) 100 MG capsule   Yes No   Sig: Take 1 capsule by mouth 2 times daily   folic acid (FOLVITE) 400 MCG tablet   Yes No   Sig: Take 1 tablet by mouth daily   gabapentin (NEURONTIN) 800 MG tablet   Yes No   Sig: Take 1 tablet by mouth 3 times daily.   ibuprofen (ADVIL;MOTRIN) 800 MG tablet   Yes No   Sig: Take 1 tablet by mouth 2 times daily   omeprazole (PRILOSEC) 40 MG delayed release capsule   Yes No   Sig: Take 1 capsule by mouth daily   oxyCODONE (OXY-IR) 30 MG immediate release tablet   Yes No   Sig: Take 1 tablet by mouth every 8 hours as needed for Pain.   tamsulosin (FLOMAX) 0.4 MG capsule   Yes No   Sig: Take 1 capsule by mouth daily   traZODone (DESYREL) 50 MG tablet   No No   Sig: Take 1 tablet by mouth nightly as needed for Sleep   vitamin D3 (CHOLECALCIFEROL) 25 MCG (1000 UT) TABS tablet   Yes No   Sig: Take 1 tablet by mouth daily      Facility-Administered

## 2024-12-15 NOTE — PLAN OF CARE
Problem: Discharge Planning  Goal: Discharge to home or other facility with appropriate resources  Outcome: Progressing  Flowsheets (Taken 12/15/2024 0803)  Discharge to home or other facility with appropriate resources: Identify barriers to discharge with patient and caregiver     Problem: Pain  Goal: Verbalizes/displays adequate comfort level or baseline comfort level  Outcome: Progressing     Problem: Safety - Adult  Goal: Free from fall injury  Outcome: Progressing

## 2024-12-15 NOTE — ED NOTES
ED to inpatient nurses report      Chief Complaint:  Chief Complaint   Patient presents with    Back Pain    Leg Pain     Present to ED from: home    MOA:     LOC: alert and orientated to name, place, date  Mobility: Requires assistance * 1  Oxygen Baseline: ra    Current needs required: ra     Code Status:   Prior    What abnormal results were found and what did you give/do to treat them? Pneumonia  Any procedures or intervention occur? none    Mental Status:  Level of Consciousness: Alert (0)    Psych Assessment:        Vitals:  Patient Vitals for the past 24 hrs:   BP Temp Temp src Pulse Resp SpO2   12/14/24 2120 (!) 119/90 -- -- (!) 126 26 92 %   12/14/24 2110 (!) 110/59 -- -- (!) 126 25 94 %   12/14/24 1905 (!) 148/76 -- -- (!) 121 18 94 %   12/14/24 1848 137/74 -- -- -- -- 93 %   12/14/24 1836 (!) 148/80 97.9 °F (36.6 °C) Oral (!) 130 18 94 %        LDAs:   Peripheral IV 12/14/24 Left;Anterior Forearm (Active)   Site Assessment Clean, dry & intact 12/14/24 2111   Line Status Infusing 12/14/24 2111       Ambulatory Status:  No data recorded    Diagnosis:  DISPOSITION Decision To Admit 12/14/2024 09:21:45 PM   Final diagnoses:   Community acquired pneumonia of right lung, unspecified part of lung   Nausea vomiting and diarrhea   Paraplegia at T9 level ([facility])        Consults:  None     Pain Score:  Pain Assessment  Pain Assessment: 0-10  Pain Level: 7  Pain Location: Back, Leg  Pain Orientation: Right, Left    C-SSRS:        Sepsis Screening:       Arturo Fall Risk:       Swallow Screening        Preferred Language:   English      ALLERGIES     Lisinopril and Penicillins    SURGICAL HISTORY       Past Surgical History:   Procedure Laterality Date    DEBRIDEMENT Left     Left foot       PAST MEDICAL HISTORY       Past Medical History:   Diagnosis Date    Hypertension            Electronically signed by Manasa Stoddard RN on 12/14/2024 at 9:31 PM

## 2024-12-15 NOTE — RT PROTOCOL NOTE
4-6 - enter or revise RT Bronchodilator order(s) to two equivalent RT bronchodilator orders with one order with BID Frequency and one order with Frequency of every 4 hours PRN wheezing or increased work of breathing using Per Protocol order mode.        7-10 - enter or revise RT Bronchodilator order(s) to two equivalent RT bronchodilator orders with one order with TID Frequency and one order with Frequency of every 4 hours PRN wheezing or increased work of breathing using Per Protocol order mode.       11-13 - enter or revise RT Bronchodilator order(s) to one equivalent RT bronchodilator order with QID Frequency and an Albuterol order with Frequency of every 4 hours PRN wheezing or increased work of breathing using Per Protocol order mode.      Greater than 13 - enter or revise RT Bronchodilator order(s) to one equivalent RT bronchodilator order with every 4 hours Frequency and an Albuterol order with Frequency of every 2 hours PRN wheezing or increased work of breathing using Per Protocol order mode.     RT to enter RT Home Evaluation for COPD & MDI Assessment order using Per Protocol order mode.    Electronically signed by Eleanor Moreira RCP on 12/15/2024 at 1:59 PM
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
## 2024-12-15 NOTE — CARE COORDINATION
12/15/24 1203   Service Assessment   Patient Orientation Alert and Oriented   Cognition Alert   History Provided By Patient   Primary Caregiver Self   Accompanied By/Relationship alone   Support Systems Friends/Neighbors   Patient's Healthcare Decision Maker is: Legal Next of Kin   PCP Verified by CM Yes   Prior Functional Level Assistance with the following:;Housework;Other (see comment)  (wound care)   Current Functional Level Assistance with the following:;Housework;Other (see comment)  (wound care)   Can patient return to prior living arrangement Yes   Ability to make needs known: Good   Family able to assist with home care needs: No   Would you like for me to discuss the discharge plan with any other family members/significant others, and if so, who? No   Financial Resources Medicare;Medicaid   Community Resources ECF/Home Care   Discharge Planning   Type of Residence Apartment   Living Arrangements Alone   Current Services Prior To Admission Home Care;Durable Medical Equipment   Current DME Prior to Arrival Wheelchair   Potential Assistance Needed Durable Medical Equipment   Potential DME Needed Shower Chair   DME Ordered? No   Potential Assistance Purchasing Medications No   Type of Home Care Services Nursing Services;Housekeeping   Patient expects to be discharged to: House   Services At/After Discharge   Transition of Care Consult (CM Consult) Discharge Planning   Services At/After Discharge None   Confirm Follow Up Transport Self   Condition of Participation: Discharge Planning   The Plan for Transition of Care is related to the following treatment goals: return home     Patient Goals/Plan/Treatment Preferences: Asim is from home alone. Has [facility] for housekeeping and nursing for wound care. States he has a wheelchair but needs a shower chair. Denies any additional needs at this time.   SW consulted.    If patient is discharged prior to next notation, then this note serves as note for

## 2024-12-15 NOTE — CONSULTS
[facility]  Podiatric Medicine and Surgery Consultation Note      Asim Back  Medical record number:  [id number]  Age: 54 y.o.   Gender: male  : 1970  Episode date:  12/15/24      This patient has been seen and evaluated by podiatry and by Dr. Ashley in the past. At this time, from a podiatry standpoint it was recommended that the patient move forward with a below-knee versus above-knee amputation due to his severe contracture/deformity as well as chronicity of the wound and noncompliance. I have consulted with Dr. Ashley today and it is still our recommendation that a BKA or AKA would best serve this patient. There is no intervention from a podiatry standpoint at this time. Thank you.           Karl Guadarrama DPM PGY-2  12/15/24 2:10 PM

## 2024-12-15 NOTE — ED NOTES
Patient transported to [facility]  by cart in stable condition.    IV infusing and line is patent.

## 2024-12-15 NOTE — PLAN OF CARE
Problem: Respiratory - Adult  Goal: Clear lung sounds  Outcome: Progressing   Pt started on vest and acapella to help clear secretions and help clear lung sounds. Patient mutually agreed on goals.

## 2024-12-16 PROBLEM — J43.9 LUNG BULLAE (HCC): Status: ACTIVE | Noted: 2024-12-16

## 2024-12-16 PROBLEM — L89.619 DECUBITUS ULCER OF HEEL, BILATERAL: Status: ACTIVE | Noted: 2024-12-16

## 2024-12-16 PROBLEM — G82.20 PARAPLEGIA AT T9 LEVEL (HCC): Status: ACTIVE | Noted: 2024-12-16

## 2024-12-16 PROBLEM — M54.9 CHRONIC BACK PAIN: Status: ACTIVE | Noted: 2024-12-16

## 2024-12-16 PROBLEM — G89.29 CHRONIC BACK PAIN: Status: ACTIVE | Noted: 2024-12-16

## 2024-12-16 PROBLEM — J18.9 COMMUNITY ACQUIRED PNEUMONIA OF RIGHT LUNG: Status: ACTIVE | Noted: 2024-12-16

## 2024-12-16 PROBLEM — E83.42 HYPOMAGNESEMIA: Status: ACTIVE | Noted: 2024-12-16

## 2024-12-16 PROBLEM — J44.9 CHRONIC OBSTRUCTIVE PULMONARY DISEASE (HCC): Status: ACTIVE | Noted: 2024-12-16

## 2024-12-16 PROBLEM — L89.629 DECUBITUS ULCER OF HEEL, BILATERAL: Status: ACTIVE | Noted: 2024-12-16

## 2024-12-16 LAB
ANION GAP SERPL CALC-SCNC: 10 MEQ/L (ref 8–16)
BASOPHILS ABSOLUTE: 0 THOU/MM3 (ref 0–0.1)
BASOPHILS NFR BLD AUTO: 0.4 %
BUN SERPL-MCNC: 14 MG/DL (ref 7–22)
CALCIUM SERPL-MCNC: 8.8 MG/DL (ref 8.5–10.5)
CHLORIDE SERPL-SCNC: 104 MEQ/L (ref 98–111)
CO2 SERPL-SCNC: 23 MEQ/L (ref 23–33)
CREAT SERPL-MCNC: 0.8 MG/DL (ref 0.4–1.2)
DEPRECATED RDW RBC AUTO: 53.1 FL (ref 35–45)
EOSINOPHIL NFR BLD AUTO: 1.7 %
EOSINOPHILS ABSOLUTE: 0.1 THOU/MM3 (ref 0–0.4)
ERYTHROCYTE [DISTWIDTH] IN BLOOD BY AUTOMATED COUNT: 14.5 % (ref 11.5–14.5)
FOLATE SERPL-MCNC: 10.3 NG/ML (ref 4.8–24.2)
GFR SERPL CREATININE-BSD FRML MDRD: > 90 ML/MIN/1.73M2
GLUCOSE SERPL-MCNC: 97 MG/DL (ref 70–108)
HCT VFR BLD AUTO: 37.4 % (ref 42–52)
HGB BLD-MCNC: 11.3 GM/DL (ref 14–18)
IMM GRANULOCYTES # BLD AUTO: 0.02 THOU/MM3 (ref 0–0.07)
IMM GRANULOCYTES NFR BLD AUTO: 0.4 %
LACTATE SERPL-SCNC: 0.9 MMOL/L (ref 0.5–2)
LYMPHOCYTES ABSOLUTE: 1.7 THOU/MM3 (ref 1–4.8)
LYMPHOCYTES NFR BLD AUTO: 31.5 %
MAGNESIUM SERPL-MCNC: 1.7 MG/DL (ref 1.6–2.4)
MCH RBC QN AUTO: 30.1 PG (ref 26–33)
MCHC RBC AUTO-ENTMCNC: 30.2 GM/DL (ref 32.2–35.5)
MCV RBC AUTO: 99.7 FL (ref 80–94)
MONOCYTES ABSOLUTE: 0.3 THOU/MM3 (ref 0.4–1.3)
MONOCYTES NFR BLD AUTO: 5.8 %
NEUTROPHILS ABSOLUTE: 3.2 THOU/MM3 (ref 1.8–7.7)
NEUTROPHILS NFR BLD AUTO: 60.2 %
NRBC BLD AUTO-RTO: 0 /100 WBC
PLATELET # BLD AUTO: 211 THOU/MM3 (ref 130–400)
PMV BLD AUTO: 9.2 FL (ref 9.4–12.4)
POTASSIUM SERPL-SCNC: 4.2 MEQ/L (ref 3.5–5.2)
PROCALCITONIN SERPL IA-MCNC: 5.13 NG/ML (ref 0.01–0.09)
RBC # BLD AUTO: 3.75 MILL/MM3 (ref 4.7–6.1)
SODIUM SERPL-SCNC: 137 MEQ/L (ref 135–145)
VIT B12 SERPL-MCNC: 477 PG/ML (ref 211–911)
WBC # BLD AUTO: 5.3 THOU/MM3 (ref 4.8–10.8)

## 2024-12-16 PROCEDURE — 94669 MECHANICAL CHEST WALL OSCILL: CPT

## 2024-12-16 PROCEDURE — 83605 ASSAY OF LACTIC ACID: CPT

## 2024-12-16 PROCEDURE — 36415 COLL VENOUS BLD VENIPUNCTURE: CPT

## 2024-12-16 PROCEDURE — 6360000002 HC RX W HCPCS

## 2024-12-16 PROCEDURE — 99232 SBSQ HOSP IP/OBS MODERATE 35: CPT

## 2024-12-16 PROCEDURE — 2580000003 HC RX 258: Performed by: EMERGENCY MEDICINE

## 2024-12-16 PROCEDURE — 94760 N-INVAS EAR/PLS OXIMETRY 1: CPT

## 2024-12-16 PROCEDURE — 6370000000 HC RX 637 (ALT 250 FOR IP)

## 2024-12-16 PROCEDURE — 80048 BASIC METABOLIC PNL TOTAL CA: CPT

## 2024-12-16 PROCEDURE — 2580000003 HC RX 258

## 2024-12-16 PROCEDURE — 1200000003 HC TELEMETRY R&B

## 2024-12-16 PROCEDURE — 85025 COMPLETE CBC W/AUTO DIFF WBC: CPT

## 2024-12-16 PROCEDURE — 82607 VITAMIN B-12: CPT

## 2024-12-16 PROCEDURE — 6370000000 HC RX 637 (ALT 250 FOR IP): Performed by: INTERNAL MEDICINE

## 2024-12-16 PROCEDURE — 84145 PROCALCITONIN (PCT): CPT

## 2024-12-16 PROCEDURE — 94640 AIRWAY INHALATION TREATMENT: CPT

## 2024-12-16 PROCEDURE — 92610 EVALUATE SWALLOWING FUNCTION: CPT

## 2024-12-16 PROCEDURE — 82746 ASSAY OF FOLIC ACID SERUM: CPT

## 2024-12-16 PROCEDURE — 83735 ASSAY OF MAGNESIUM: CPT

## 2024-12-16 RX ORDER — OXYCODONE HYDROCHLORIDE 5 MG/1
5 TABLET ORAL ONCE
Status: COMPLETED | OUTPATIENT
Start: 2024-12-16 | End: 2024-12-16

## 2024-12-16 RX ORDER — ALBUTEROL SULFATE 0.83 MG/ML
5 SOLUTION RESPIRATORY (INHALATION) 2 TIMES DAILY
Status: DISCONTINUED | OUTPATIENT
Start: 2024-12-16 | End: 2024-12-17 | Stop reason: HOSPADM

## 2024-12-16 RX ORDER — OXYCODONE HYDROCHLORIDE 5 MG/1
5 TABLET ORAL EVERY 12 HOURS PRN
Status: DISCONTINUED | OUTPATIENT
Start: 2024-12-16 | End: 2024-12-16

## 2024-12-16 RX ORDER — ACETAMINOPHEN 650 MG/1
650 SUPPOSITORY RECTAL EVERY 6 HOURS PRN
Status: DISCONTINUED | OUTPATIENT
Start: 2024-12-16 | End: 2024-12-17 | Stop reason: HOSPADM

## 2024-12-16 RX ORDER — ACETAMINOPHEN 325 MG/1
650 TABLET ORAL EVERY 8 HOURS SCHEDULED
Status: DISCONTINUED | OUTPATIENT
Start: 2024-12-16 | End: 2024-12-17 | Stop reason: HOSPADM

## 2024-12-16 RX ORDER — ACETAMINOPHEN 325 MG/1
650 TABLET ORAL EVERY 12 HOURS PRN
Status: DISCONTINUED | OUTPATIENT
Start: 2024-12-16 | End: 2024-12-17 | Stop reason: HOSPADM

## 2024-12-16 RX ORDER — BUDESONIDE AND FORMOTEROL FUMARATE DIHYDRATE 160; 4.5 UG/1; UG/1
2 AEROSOL RESPIRATORY (INHALATION)
Status: DISCONTINUED | OUTPATIENT
Start: 2024-12-16 | End: 2024-12-17 | Stop reason: HOSPADM

## 2024-12-16 RX ADMIN — OXYCODONE HYDROCHLORIDE 20 MG: 15 TABLET ORAL at 21:49

## 2024-12-16 RX ADMIN — WATER 1000 MG: 1 INJECTION INTRAMUSCULAR; INTRAVENOUS; SUBCUTANEOUS at 20:31

## 2024-12-16 RX ADMIN — FOLIC ACID 500 MCG: 1 TABLET ORAL at 08:59

## 2024-12-16 RX ADMIN — SODIUM CHLORIDE, PRESERVATIVE FREE 10 ML: 5 INJECTION INTRAVENOUS at 09:07

## 2024-12-16 RX ADMIN — ENOXAPARIN SODIUM 40 MG: 100 INJECTION SUBCUTANEOUS at 08:59

## 2024-12-16 RX ADMIN — OXYCODONE HYDROCHLORIDE 20 MG: 15 TABLET ORAL at 13:54

## 2024-12-16 RX ADMIN — ASPIRIN 81 MG 81 MG: 81 TABLET ORAL at 08:59

## 2024-12-16 RX ADMIN — BUDESONIDE AND FORMOTEROL FUMARATE DIHYDRATE 2 PUFF: 160; 4.5 AEROSOL RESPIRATORY (INHALATION) at 21:36

## 2024-12-16 RX ADMIN — TRAZODONE HYDROCHLORIDE 50 MG: 50 TABLET ORAL at 20:31

## 2024-12-16 RX ADMIN — PANTOPRAZOLE SODIUM 40 MG: 40 TABLET, DELAYED RELEASE ORAL at 06:37

## 2024-12-16 RX ADMIN — SODIUM CHLORIDE: 9 INJECTION, SOLUTION INTRAVENOUS at 00:51

## 2024-12-16 RX ADMIN — OXYCODONE HYDROCHLORIDE 30 MG: 15 TABLET ORAL at 05:24

## 2024-12-16 RX ADMIN — GABAPENTIN 400 MG: 400 CAPSULE ORAL at 08:59

## 2024-12-16 RX ADMIN — OXYCODONE 5 MG: 5 TABLET ORAL at 09:15

## 2024-12-16 RX ADMIN — GABAPENTIN 800 MG: 400 CAPSULE ORAL at 20:30

## 2024-12-16 RX ADMIN — TAMSULOSIN HYDROCHLORIDE 0.4 MG: 0.4 CAPSULE ORAL at 08:59

## 2024-12-16 RX ADMIN — ACETAMINOPHEN 650 MG: 325 TABLET ORAL at 13:54

## 2024-12-16 RX ADMIN — DOXYCYCLINE HYCLATE 100 MG: 100 TABLET, COATED ORAL at 05:25

## 2024-12-16 RX ADMIN — ACETAMINOPHEN 650 MG: 325 TABLET ORAL at 20:30

## 2024-12-16 RX ADMIN — DOXYCYCLINE HYCLATE 100 MG: 100 TABLET, COATED ORAL at 17:08

## 2024-12-16 RX ADMIN — ARIPIPRAZOLE 5 MG: 5 TABLET ORAL at 20:30

## 2024-12-16 RX ADMIN — BUDESONIDE AND FORMOTEROL FUMARATE DIHYDRATE 2 PUFF: 160; 4.5 AEROSOL RESPIRATORY (INHALATION) at 09:54

## 2024-12-16 RX ADMIN — AMLODIPINE BESYLATE 10 MG: 10 TABLET ORAL at 10:37

## 2024-12-16 RX ADMIN — ATORVASTATIN CALCIUM 10 MG: 10 TABLET, FILM COATED ORAL at 20:31

## 2024-12-16 RX ADMIN — DULOXETINE HYDROCHLORIDE 60 MG: 60 CAPSULE, DELAYED RELEASE ORAL at 08:59

## 2024-12-16 RX ADMIN — SODIUM CHLORIDE, PRESERVATIVE FREE 10 ML: 5 INJECTION INTRAVENOUS at 20:31

## 2024-12-16 RX ADMIN — ALBUTEROL SULFATE 5 MG: 2.5 SOLUTION RESPIRATORY (INHALATION) at 21:36

## 2024-12-16 RX ADMIN — ACETAMINOPHEN 650 MG: 325 TABLET ORAL at 09:15

## 2024-12-16 RX ADMIN — ALBUTEROL SULFATE 5 MG: 2.5 SOLUTION RESPIRATORY (INHALATION) at 09:42

## 2024-12-16 ASSESSMENT — PAIN SCALES - GENERAL
PAINLEVEL_OUTOF10: 8
PAINLEVEL_OUTOF10: 10
PAINLEVEL_OUTOF10: 6
PAINLEVEL_OUTOF10: 8
PAINLEVEL_OUTOF10: 10
PAINLEVEL_OUTOF10: 10
PAINLEVEL_OUTOF10: 9
PAINLEVEL_OUTOF10: 10

## 2024-12-16 ASSESSMENT — PAIN DESCRIPTION - LOCATION
LOCATION: BACK;FOOT
LOCATION: BACK;FOOT
LOCATION: FOOT
LOCATION: FOOT

## 2024-12-16 ASSESSMENT — PAIN DESCRIPTION - ORIENTATION
ORIENTATION: RIGHT;LEFT

## 2024-12-16 ASSESSMENT — PAIN - FUNCTIONAL ASSESSMENT: PAIN_FUNCTIONAL_ASSESSMENT: ACTIVITIES ARE NOT PREVENTED

## 2024-12-16 ASSESSMENT — PAIN DESCRIPTION - DESCRIPTORS
DESCRIPTORS: ACHING
DESCRIPTORS: ACHING

## 2024-12-16 NOTE — CARE COORDINATION
Case Management Assessment Initial Evaluation    Date/Time of Evaluation: 12/16/2024 9:31 AM  Assessment Completed by: Jud Dean RN    If patient is discharged prior to next notation, then this note serves as note for discharge by case management.    Patient Name: Asim Back                   YOB: 1970  Diagnosis: Hypomagnesemia [E83.42]  Nausea vomiting and diarrhea [R11.2, R19.7]  Paraplegia at T9 level (HCC) [G82.20]  Leukocytosis, unspecified type [D72.829]  Pneumonia due to infectious organism [J18.9]  Chronic back pain, unspecified back location, unspecified back pain laterality [M54.9, G89.29]  Decubitus ulcer of heel, bilateral [L89.619, L89.629]  Community acquired pneumonia of right lung, unspecified part of lung [J18.9]                   Date / Time: 12/14/2024  6:27 PM  Location: [address] [place] [address]     Patient Admission Status: Inpatient   Readmission Risk Low 0-14, Mod 15-19), High > 20: Readmission Risk Score: 19.9    Current PCP: Vinita Agrawal APRN - NP  Health Care Decision Makers:     Additional Case Management Notes: to ED evaluation of back and leg pain for 1 week as he ran out of his oxycodone 30 mg IR 3 times daily a week ago as he was unable to go back to the [facility].    IV Rocephin, podiatry consulted.    Procedures: none    Imaging:  CXR:  1. There is a large lucent bulla noted overlying the right upper and midlung   zone which appears similar when compared to previous chest radiograph dated   2/27/2024.   2. Mild increase patchy airspace opacities at the right lung base may represent   underlying atelectasis or pneumonia.     Patient Goals/Plan/Treatment Preferences: Asim is from home alone. Has [facility] for housekeeping and nursing for wound care. States he has a wheelchair but requested a shower chair.  Order placed. SW follows.         Asim Back was evaluated today and a DME order was entered for a shower chair because he requires

## 2024-12-16 NOTE — CARE COORDINATION
DISCHARGE PLANNING EVALUATION  12/16/24, 12:56 PM EST    Reason for Referral:   Decision Maker: Self  Current Services: Rn and aide from [facility]  New Services Requested: None  Family/ Social/ Home environment: Patient resides  at home alone in an apartment.   Payment Source: Medicare, Medicaid  Transportation at Discharge: needs a ride  Post-acute (PAC) provider list was provided to patient. Patient was informed of their freedom to choose PAC provider. Discussed and offered to show the patient the relevant PAC Providers quality and resource use measures on Medicare Compare web site via computer based on patient's goals of care and treatment preferences. Questions regarding selection process were answered.      Teach Back Method used with Asim regarding care plan and options for discharge.   Patient verbalized understanding of the plan of care and contribute to goal setting.       Patient preferences and discharge plan: Patient stated that he is current with [facility].  Called and spoke with Eleanor Lee and verified services patient is current for RN and aide.  Will need updated at discharge.  Patient also stated that he will need a ride home.     Electronically signed by KEVON Neil on 12/16/2024 at 12:56 PM

## 2024-12-16 NOTE — PLAN OF CARE
Problem: Respiratory - Adult  Goal: Clear lung sounds  Outcome: Progressing   Breath sounds clear and diminished, continuing treatments as ordered.

## 2024-12-16 NOTE — PLAN OF CARE
Problem: Discharge Planning  Goal: Discharge to home or other facility with appropriate resources  12/15/2024 2233 by Courtney Smith RN  Outcome: Progressing  Flowsheets (Taken 12/15/2024 1939)  Discharge to home or other facility with appropriate resources: Identify barriers to discharge with patient and caregiver  12/15/2024 0836 by Diana Herrera RN  Outcome: Progressing  Flowsheets (Taken 12/15/2024 0803)  Discharge to home or other facility with appropriate resources: Identify barriers to discharge with patient and caregiver     Problem: Pain  Goal: Verbalizes/displays adequate comfort level or baseline comfort level  12/15/2024 2233 by Courtney Smith RN  Outcome: Progressing  12/15/2024 0836 by Diana Herrera RN  Outcome: Progressing     Problem: Safety - Adult  Goal: Free from fall injury  12/15/2024 2233 by Courtney Smith RN  Outcome: Progressing  12/15/2024 0836 by Diana Herrera RN  Outcome: Progressing     Problem: Respiratory - Adult  Goal: Clear lung sounds  12/15/2024 1219 by Flaquita Barnhart RCP  Outcome: Progressing

## 2024-12-17 VITALS
TEMPERATURE: 97.5 F | BODY MASS INDEX: 22.96 KG/M2 | DIASTOLIC BLOOD PRESSURE: 108 MMHG | RESPIRATION RATE: 16 BRPM | WEIGHT: 194.45 LBS | OXYGEN SATURATION: 92 % | HEART RATE: 107 BPM | HEIGHT: 77 IN | SYSTOLIC BLOOD PRESSURE: 156 MMHG

## 2024-12-17 PROBLEM — J18.9 ACUTE PNEUMONIA: Status: ACTIVE | Noted: 2024-12-17

## 2024-12-17 LAB
ANION GAP SERPL CALC-SCNC: 10 MEQ/L (ref 8–16)
BUN SERPL-MCNC: 13 MG/DL (ref 7–22)
CALCIUM SERPL-MCNC: 9 MG/DL (ref 8.5–10.5)
CHLORIDE SERPL-SCNC: 106 MEQ/L (ref 98–111)
CO2 SERPL-SCNC: 23 MEQ/L (ref 23–33)
CREAT SERPL-MCNC: 0.9 MG/DL (ref 0.4–1.2)
GFR SERPL CREATININE-BSD FRML MDRD: > 90 ML/MIN/1.73M2
GLUCOSE SERPL-MCNC: 89 MG/DL (ref 70–108)
POTASSIUM SERPL-SCNC: 4.1 MEQ/L (ref 3.5–5.2)
SODIUM SERPL-SCNC: 139 MEQ/L (ref 135–145)

## 2024-12-17 PROCEDURE — 2580000003 HC RX 258

## 2024-12-17 PROCEDURE — 6370000000 HC RX 637 (ALT 250 FOR IP)

## 2024-12-17 PROCEDURE — 6360000002 HC RX W HCPCS

## 2024-12-17 PROCEDURE — 36415 COLL VENOUS BLD VENIPUNCTURE: CPT

## 2024-12-17 PROCEDURE — 94761 N-INVAS EAR/PLS OXIMETRY MLT: CPT

## 2024-12-17 PROCEDURE — 99239 HOSP IP/OBS DSCHRG MGMT >30: CPT

## 2024-12-17 PROCEDURE — 6370000000 HC RX 637 (ALT 250 FOR IP): Performed by: INTERNAL MEDICINE

## 2024-12-17 PROCEDURE — 80048 BASIC METABOLIC PNL TOTAL CA: CPT

## 2024-12-17 PROCEDURE — 94640 AIRWAY INHALATION TREATMENT: CPT

## 2024-12-17 RX ORDER — ALBUTEROL SULFATE 90 UG/1
2 INHALANT RESPIRATORY (INHALATION) 4 TIMES DAILY PRN
Qty: 8.5 G | Refills: 0 | Status: SHIPPED | OUTPATIENT
Start: 2024-12-17

## 2024-12-17 RX ORDER — DOXYCYCLINE HYCLATE 100 MG
100 TABLET ORAL EVERY 12 HOURS SCHEDULED
Status: DISCONTINUED | OUTPATIENT
Start: 2024-12-17 | End: 2024-12-17 | Stop reason: HOSPADM

## 2024-12-17 RX ORDER — MOMETASONE FUROATE AND FORMOTEROL FUMARATE DIHYDRATE 50; 5 UG/1; UG/1
2 AEROSOL RESPIRATORY (INHALATION) DAILY
Qty: 39 G | Refills: 3 | Status: SHIPPED | OUTPATIENT
Start: 2024-12-17

## 2024-12-17 RX ORDER — DOXYCYCLINE HYCLATE 100 MG
100 TABLET ORAL EVERY 12 HOURS SCHEDULED
Qty: 9 TABLET | Refills: 0 | Status: SHIPPED | OUTPATIENT
Start: 2024-12-17 | End: 2024-12-22

## 2024-12-17 RX ORDER — CEFPODOXIME PROXETIL 200 MG/1
200 TABLET, FILM COATED ORAL 2 TIMES DAILY
Qty: 10 TABLET | Refills: 0 | Status: SHIPPED | OUTPATIENT
Start: 2024-12-17 | End: 2024-12-22

## 2024-12-17 RX ORDER — NICOTINE 21 MG/24HR
1 PATCH, TRANSDERMAL 24 HOURS TRANSDERMAL DAILY
Qty: 30 PATCH | Refills: 3 | Status: SHIPPED | OUTPATIENT
Start: 2024-12-17

## 2024-12-17 RX ADMIN — ACETAMINOPHEN 650 MG: 325 TABLET ORAL at 05:06

## 2024-12-17 RX ADMIN — DULOXETINE HYDROCHLORIDE 90 MG: 60 CAPSULE, DELAYED RELEASE ORAL at 08:27

## 2024-12-17 RX ADMIN — FOLIC ACID 500 MCG: 1 TABLET ORAL at 08:27

## 2024-12-17 RX ADMIN — GABAPENTIN 800 MG: 400 CAPSULE ORAL at 08:27

## 2024-12-17 RX ADMIN — PANTOPRAZOLE SODIUM 40 MG: 40 TABLET, DELAYED RELEASE ORAL at 05:11

## 2024-12-17 RX ADMIN — ASPIRIN 81 MG 81 MG: 81 TABLET ORAL at 08:31

## 2024-12-17 RX ADMIN — OXYCODONE HYDROCHLORIDE 20 MG: 15 TABLET ORAL at 06:44

## 2024-12-17 RX ADMIN — CYCLOBENZAPRINE 10 MG: 10 TABLET, FILM COATED ORAL at 05:06

## 2024-12-17 RX ADMIN — AMLODIPINE BESYLATE 10 MG: 10 TABLET ORAL at 08:28

## 2024-12-17 RX ADMIN — SODIUM CHLORIDE, PRESERVATIVE FREE 10 ML: 5 INJECTION INTRAVENOUS at 08:29

## 2024-12-17 RX ADMIN — BUDESONIDE AND FORMOTEROL FUMARATE DIHYDRATE 2 PUFF: 160; 4.5 AEROSOL RESPIRATORY (INHALATION) at 08:08

## 2024-12-17 RX ADMIN — DOXYCYCLINE HYCLATE 100 MG: 100 TABLET, COATED ORAL at 05:08

## 2024-12-17 RX ADMIN — ALBUTEROL SULFATE 5 MG: 2.5 SOLUTION RESPIRATORY (INHALATION) at 08:06

## 2024-12-17 RX ADMIN — OXYCODONE HYDROCHLORIDE 20 MG: 15 TABLET ORAL at 13:55

## 2024-12-17 RX ADMIN — TAMSULOSIN HYDROCHLORIDE 0.4 MG: 0.4 CAPSULE ORAL at 08:29

## 2024-12-17 ASSESSMENT — PAIN - FUNCTIONAL ASSESSMENT
PAIN_FUNCTIONAL_ASSESSMENT: ACTIVITIES ARE NOT PREVENTED

## 2024-12-17 ASSESSMENT — PAIN DESCRIPTION - ONSET
ONSET: ON-GOING
ONSET: ON-GOING

## 2024-12-17 ASSESSMENT — PAIN DESCRIPTION - LOCATION
LOCATION: BACK;FOOT
LOCATION: BACK
LOCATION: BACK
LOCATION: BACK;FOOT
LOCATION: BACK

## 2024-12-17 ASSESSMENT — PAIN DESCRIPTION - FREQUENCY
FREQUENCY: CONTINUOUS
FREQUENCY: CONTINUOUS

## 2024-12-17 ASSESSMENT — PAIN SCALES - GENERAL
PAINLEVEL_OUTOF10: 10
PAINLEVEL_OUTOF10: 8
PAINLEVEL_OUTOF10: 10
PAINLEVEL_OUTOF10: 8
PAINLEVEL_OUTOF10: 9
PAINLEVEL_OUTOF10: 10
PAINLEVEL_OUTOF10: 9

## 2024-12-17 ASSESSMENT — PAIN DESCRIPTION - ORIENTATION
ORIENTATION: LOWER;MID

## 2024-12-17 ASSESSMENT — PAIN DESCRIPTION - PAIN TYPE
TYPE: ACUTE PAIN;CHRONIC PAIN
TYPE: ACUTE PAIN;CHRONIC PAIN

## 2024-12-17 ASSESSMENT — PAIN DESCRIPTION - DESCRIPTORS
DESCRIPTORS: ACHING

## 2024-12-17 NOTE — CARE COORDINATION
12/17/24, 1:32 PM EST    DISCHARGE PLANNING EVALUATION    Notified [facility] of discharge planned for today.  [facility] will resume services.       12/17/24, 1:33 PM EST    Patient goals/plan/ treatment preferences discussed by  and .  Patient goals/plan/ treatment preferences reviewed with patient/ family.  Patient/ family verbalize understanding of discharge plan and are in agreement with goal/plan/treatment preferences.  Understanding was demonstrated using the teach back method.  AVS provided by RN at time of discharge, which includes all necessary medical information pertaining to the patients current course of illness, treatment, post-discharge goals of care, and treatment preferences.     Services At/After Discharge: Home Health and In ambulette

## 2024-12-17 NOTE — SIGNIFICANT EVENT
Discussed with patient. He is refusing Pulmonary consult. Understands the risks including sudden bullae rupture, SOB, may lead shock and death. Pt voiced back understaffing the risks. He is willing to see Pulmonary medicine as outpatient. So, Referral is given, appointment is arranged by RN.

## 2024-12-17 NOTE — PLAN OF CARE
Problem: Pain  Goal: Verbalizes/displays adequate comfort level or baseline comfort level  Outcome: Progressing     Problem: Safety - Adult  Goal: Free from fall injury  Outcome: Progressing     Problem: Discharge Planning  Goal: Discharge to home or other facility with appropriate resources  12/16/2024 0080 by Esperanza Lewis RN  Outcome: Progressing  12/16/2024 1259 by Yohana Ruiz LSW  Outcome: Progressing   Care plan reviewed with patient. Patient verbalize understanding of the plan of care and contribute to goal setting.

## 2024-12-17 NOTE — PROGRESS NOTES
Hospitalist Progress Note    Patient:  Asim Back      Unit/Bed:7K-14/014-A    YOB: 1970    MRN: [id number]       Acct: [id number]     PCP: Vinita Agrawal APRN - NP    Date of Admission: 12/14/2024    Assessment/Plan:    Sepsis with acute pneumonia, underlying COPD likely undiagnosed and lung bulla.  -Will continue current antibiotic IV since patient has had underlying COPD and big bullae of the lung, and currently patient still producing yellow/greenish sputum, on lab Pro-Madi is still significantly elevated but trending down.  Considering to switch p.o. antibiotic tomorrow  -Breathing treatments.  Albuterol 4 times daily nebs and as needed DuoNebs for wheezing  -Daily CBC.  -Supportive care.  -Fall/aspiration precaution  -Patient will need PFTs at the discharge, and pulmonary service referral as outpatient after discharge.  -Chest physiotherapy/incentive spirometry for now discontinued since patient has had big lung bulla which may put patient in a high risk for rupture/pneumothorax spontaneous.  COPD.  No PFTs on the file.    -Patient smoker, extensively advised/educated regarding quitting from smoking.    -Patient will need PFTs and pulmonary service outpatient referral to discharge.  -Started Symbicort for now.  DuoNeb nebs as needed  Lung bullae.  As above #1.  Patient will need pulmonary service referral after discharge as outpatient.  Chronic pain syndrome.  Patient takes OxyContin, verified with the RN dose which is 20 mg Q8, dose adjusted.  Will continue gabapentin and Cymbalta.  Tylenol scheduled 4 times daily.  Hypomagnesemia.  Resolved  Cannabis abuse.  UDS positive.  Patient is educated/informed regarding quitting from illicit drugs.  Tobacco smoking.  Patient is advised and educated regarding quitting from smoking.  Deconditioning.  Due to chronic multiple comorbidities.  PT OT.  Primary hypertension.  Home meds resumed.  Depression/anxiety/unspecified mood disorder.  
Discharge instructions given to patient along with referral to pain clinic, medications from pharmacy, and order for PFT's.  All questions answered. All belongings packed and ready for transport. Transport via wheelchair van.   
Discussed oxycodone not due yet with Dr. Sim and patient transport here. See order for ok to give early and discharge patient.   
Discussed scheduling pulmonary function tests with central scheduling. Patient to see pulmonary first as scheduled and then pulmonary office will schedule testing. Test orders to be sent home with patient for future.   
[facility]  Progress Note       Asim Back  AGE: 54 y.o.   GENDER: male  : 1970  UNIT: 7K-14/014-A  TODAY'S DATE:  2024  ADMISSION DATE: 2024  6:27 PM    Subjective:       Asim Back is a 54 y.o. male referred by:   [] Physician/ Resident/ PA/ MICHELLE-CNP  [x] Nursing  [] Other:     Summary:      Wound ostomy consult received for Old buttocks scratches? BLE wounds. Podiatry on and recs BKA but not following for wound care . Documentation reviewed. Staff documenting no description on wound assessment with no drainage and dry dressings. No documentation for buttock wounds or LLE wounds. Recommend staff to utilize Rock and Wound Care order sets. See below. If wound evolves during hospital admission, please call.     Treatment Recommendations:  -Turn every 2 hours and PRN  -Offload with pillows or wedges  -Ensure patient is on low air loss support surface ([place], SPR+, [place], Bariatric bed)  -Utilize moisture wicking underpads  -Limit use of depends, except when working with therapy  -If applicable, use waffle cushion when in chair  -Continue to follow Podiatry recommendations for BLE wounds    How to: Rock and Wound Care Orders         Type WOUND in order set search bar.  Right click Wound Care Orders, select add to favorites.  Right click GEN Rock Scale Focused, select add to favorites.        Check GEN Rock Scale Focus and Wound Care Orders.  Select appropriate orders.  Sign orders as Per Protocol: No Cosign Required.   These are independent nursing orders.       
Patient does not want to wait any longer for pulmonary consult. He wants to be discharged. Dr. Sim updated. He will discharge and sent scripts around 1:00. Ambulette form sent to [facility] for discharge time around 2:30 after last dose of patient pain medication.   
Pt refused bed alarm for safety. Educated on safety precautions and the fall risk scale. Voices understanding.  
Spiritual Health History and Assessment/Progress Note  [facility]    (P) Initial Encounter,  ,  ,      Name: Asim Back MRN: [id number]    Age: 54 y.o.     Sex: male   Language: English   Jainism: None   Pneumonia due to infectious organism     Date: 12/17/2024            Total Time Calculated: (P) 6 min              Spiritual Assessment continued in [facility] ORTHOPEDICS 7K        Referral/Consult From: (P) Rounding   Encounter Overview/Reason: (P) Initial Encounter  Service Provided For: (P) Patient    Pratima, Belief, Meaning:   Patient unable to assess at this time  Family/Friends No family/friends present      Importance and Influence:  Patient unable to assess at this time  Family/Friends No family/friends present    Community:  Patient feels well-supported. Support system includes: Friends  Family/Friends feel well-supported. Support system includes: Unknown    Assessment and Plan of Care:     Patient Interventions include: Other: Not known  Family/Friends Interventions include: No family/friends present    Patient Plan of Care: No spiritual needs identified for follow-up  Family/Friends Plan of Care: No family/friends present    Electronically signed by LETICIA Bradford on 12/17/2024 at 3:21 PM   
Spoke with Zoë Jin from [facility] pain clinic at this time. They will not see patient until he is weaned down off large dose of oxycodone. They ask for family MD to continue to wean patient down and to make a new referral once patient is at 10mg TID PRN or less. Pain clinic information placed in chart. Information communicated to patient.   
This rn asked patient to verify his significant other is home so he can get in to his apartment. Patient ensures that she is home and he can get inside no problem.   
Updated Dr. Sim on patient last fill of oxycodone at home that was 20 mg q8 prn. Also patient elected not to take all of his gabapentin or cymbalta this am. Patient also falling asleep talking with nurses. No new orders. Continue to monitor.   
Updated Eleanor at [facility] that patient was discharged today and they can resume care.   
Patient also reports that he is having episodes of \"shakes\" due to feeling unwell.  Patient also reports palpitations, stating that his \"heart is racing\".  Patient denies any lightheadedness, dizziness.     ED course: Initial vitals include temperature 97.9°F, respiratory rate 18, heart rate 130, /80, SpO2 94% on room air.  Pertinent initial labs include WBC 24.1, lactic acid 2.7 repeat 3.1 then 1.8, magnesium 1.5, Pro-Madi 6.56.  CXR shows Mild increased patchy airspace opacities in the right lung base suggestive of pneumonia.  Patient given Rocephin 1 g,  mL bolus, 2 g magnesium, Percocet 5 x 2 in the ED.      Patient admitted to hospitalist services for further management and treatment of sepsis due to pneumonia.\"     Past Medical History:   Diagnosis Date    Hypertension        SUBJECTIVE:  OTONIEL Quintana approved completion of clinical swallow evaluation. Upon arrival to room, patient sitting EOB consuming breakfast meal. Agreeable to evaluation. Pleasant and cooperative throughout.     OBJECTIVE:    Pain:  No pain reported.    Current Diet: Regular diet, thin liquids     Respiratory Status:  Room Air    Behavioral Observation:  Alert and Oriented    CRANIAL NERVE ASSESSMENT   CN V (Trigeminal) Closes and Opens Mandible WFL    Rotary Jaw Movement WFL      CN VII (Facial) Cheeks Hold Food out of Sulci WFL    Opens, Closes/Seals, Protrudes, Retracts Lips WFL    General Appearance WFL    Sensation WFL      CN X (Vagus - Pharyngeal) Raises Back of Tongue WFL      CN XI (Accessory) Lifts Soft Palate WFL      CN XII (Hypoglossal) Elevates Tongue Up and Back WFL    Protrusion   WFL    Lateralizes Tongue WFL    Sensation WFL      Other Observations Dentition WFL    Vocal Quality WFL    Cough WFL     Patient Evaluated Using: Thin Liquids and Coarse Solids    Oral Phase:  WFL    Pharyngeal Phase: WFL:  Pharyngeal phase appears WFL but cannot rule out pharyngeal phase deficits from a bedside swallowing evaluation 
patchy airspace opacities at the right lung base may represent underlying atelectasis or pneumonia. **This report has been created using voice recognition software.  It may contain minor errors which are inherent in voice recognition technology.** Electronically signed by Dr. Praveen Bass      Electronically signed by Matt Guerra MD on 12/15/2024 at 1:10 PM

## 2024-12-17 NOTE — DISCHARGE INSTR - DIET
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
## 2024-12-19 LAB
BACTERIA BLD AEROBE CULT: NORMAL
BACTERIA BLD AEROBE CULT: NORMAL

## 2025-03-02 ENCOUNTER — HOSPITAL ENCOUNTER (EMERGENCY)
Age: 55
Discharge: PSYCHIATRIC OR SUBSTANCE USE FACILITY | End: 2025-03-03
Attending: EMERGENCY MEDICINE
Payer: MEDICARE

## 2025-03-02 DIAGNOSIS — F32.A DEPRESSION WITH SUICIDAL IDEATION: Primary | ICD-10-CM

## 2025-03-02 DIAGNOSIS — R45.851 DEPRESSION WITH SUICIDAL IDEATION: Primary | ICD-10-CM

## 2025-03-02 DIAGNOSIS — G82.20 PARAPLEGIA AT T9 LEVEL (HCC): ICD-10-CM

## 2025-03-02 DIAGNOSIS — F14.10 NONDEPENDENT COCAINE ABUSE (HCC): ICD-10-CM

## 2025-03-02 LAB
ALBUMIN SERPL BCG-MCNC: 3.8 G/DL (ref 3.4–4.9)
ALP SERPL-CCNC: 74 U/L (ref 40–129)
ALT SERPL W/O P-5'-P-CCNC: 17 U/L (ref 10–50)
AMPHETAMINES UR QL SCN: NEGATIVE
ANION GAP SERPL CALC-SCNC: 12 MEQ/L (ref 8–16)
APAP SERPL-MCNC: < 5 UG/ML (ref 10–30)
AST SERPL-CCNC: 24 U/L (ref 10–50)
BARBITURATES UR QL SCN: NEGATIVE
BASOPHILS ABSOLUTE: 0 THOU/MM3 (ref 0–0.1)
BASOPHILS NFR BLD AUTO: 0.4 %
BENZODIAZ UR QL SCN: NEGATIVE
BILIRUB CONJ SERPL-MCNC: < 0.1 MG/DL (ref 0–0.2)
BILIRUB SERPL-MCNC: < 0.2 MG/DL (ref 0.3–1.2)
BILIRUB UR QL STRIP: NEGATIVE
BUN SERPL-MCNC: 20 MG/DL (ref 8–23)
BZE UR QL SCN: NORMAL
CALCIUM SERPL-MCNC: 9.1 MG/DL (ref 8.6–10)
CANNABINOIDS UR QL SCN: NEGATIVE
CHARACTER UR: CLEAR
CHLORIDE SERPL-SCNC: 105 MEQ/L (ref 98–111)
CO2 SERPL-SCNC: 21 MEQ/L (ref 22–29)
COLOR UR: YELLOW
CREAT SERPL-MCNC: 0.8 MG/DL (ref 0.7–1.2)
DEPRECATED RDW RBC AUTO: 45.1 FL (ref 35–45)
EKG ATRIAL RATE: 99 BPM
EKG P AXIS: 73 DEGREES
EKG P-R INTERVAL: 160 MS
EKG Q-T INTERVAL: 362 MS
EKG QRS DURATION: 88 MS
EKG QTC CALCULATION (BAZETT): 464 MS
EKG R AXIS: 65 DEGREES
EKG T AXIS: 70 DEGREES
EKG VENTRICULAR RATE: 99 BPM
EOSINOPHIL NFR BLD AUTO: 2.5 %
EOSINOPHILS ABSOLUTE: 0.1 THOU/MM3 (ref 0–0.4)
ERYTHROCYTE [DISTWIDTH] IN BLOOD BY AUTOMATED COUNT: 12.9 % (ref 11.5–14.5)
ETHANOL SERPL-MCNC: 0.02 % (ref 0–0.08)
FENTANYL: NEGATIVE
GFR SERPL CREATININE-BSD FRML MDRD: > 90 ML/MIN/1.73M2
GLUCOSE SERPL-MCNC: 97 MG/DL (ref 74–109)
GLUCOSE UR QL STRIP.AUTO: NEGATIVE MG/DL
HCT VFR BLD AUTO: 38 % (ref 42–52)
HGB BLD-MCNC: 12.4 GM/DL (ref 14–18)
HGB UR QL STRIP.AUTO: NEGATIVE
IMM GRANULOCYTES # BLD AUTO: 0.01 THOU/MM3 (ref 0–0.07)
IMM GRANULOCYTES NFR BLD AUTO: 0.2 %
KETONES UR QL STRIP.AUTO: NEGATIVE
LEUKOCYTE ESTERASE UR QL STRIP.AUTO: NEGATIVE
LYMPHOCYTES ABSOLUTE: 1.6 THOU/MM3 (ref 1–4.8)
LYMPHOCYTES NFR BLD AUTO: 32.6 %
MCH RBC QN AUTO: 31.1 PG (ref 26–33)
MCHC RBC AUTO-ENTMCNC: 32.6 GM/DL (ref 32.2–35.5)
MCV RBC AUTO: 95.2 FL (ref 80–94)
MONOCYTES ABSOLUTE: 0.3 THOU/MM3 (ref 0.4–1.3)
MONOCYTES NFR BLD AUTO: 6.9 %
NEUTROPHILS ABSOLUTE: 2.8 THOU/MM3 (ref 1.8–7.7)
NEUTROPHILS NFR BLD AUTO: 57.4 %
NITRITE UR QL STRIP.AUTO: NEGATIVE
NRBC BLD AUTO-RTO: 0 /100 WBC
OPIATES UR QL SCN: NEGATIVE
OSMOLALITY SERPL CALC.SUM OF ELEC: 278.2 MOSMOL/KG (ref 275–300)
OXYCODONE: NEGATIVE
PCP UR QL SCN: NEGATIVE
PH UR STRIP.AUTO: 5.5 [PH] (ref 5–9)
PLATELET # BLD AUTO: 208 THOU/MM3 (ref 130–400)
PMV BLD AUTO: 9.4 FL (ref 9.4–12.4)
POTASSIUM SERPL-SCNC: 3.6 MEQ/L (ref 3.5–5.2)
PROT SERPL-MCNC: 7 G/DL (ref 6.4–8.3)
PROT UR STRIP.AUTO-MCNC: NEGATIVE MG/DL
RBC # BLD AUTO: 3.99 MILL/MM3 (ref 4.7–6.1)
SALICYLATES SERPL-MCNC: < 0.5 MG/DL (ref 2–10)
SODIUM SERPL-SCNC: 138 MEQ/L (ref 135–145)
SP GR UR REFRACT.AUTO: 1.02 (ref 1–1.03)
TROPONIN, HIGH SENSITIVITY: 9 NG/L (ref 0–12)
UROBILINOGEN UR QL STRIP.AUTO: 1 EU/DL (ref 0–1)
WBC # BLD AUTO: 4.8 THOU/MM3 (ref 4.8–10.8)

## 2025-03-02 PROCEDURE — 6370000000 HC RX 637 (ALT 250 FOR IP): Performed by: EMERGENCY MEDICINE

## 2025-03-02 PROCEDURE — 85025 COMPLETE CBC W/AUTO DIFF WBC: CPT

## 2025-03-02 PROCEDURE — 84484 ASSAY OF TROPONIN QUANT: CPT

## 2025-03-02 PROCEDURE — 36415 COLL VENOUS BLD VENIPUNCTURE: CPT

## 2025-03-02 PROCEDURE — 93010 ELECTROCARDIOGRAM REPORT: CPT | Performed by: NUCLEAR MEDICINE

## 2025-03-02 PROCEDURE — 80053 COMPREHEN METABOLIC PANEL: CPT

## 2025-03-02 PROCEDURE — 93005 ELECTROCARDIOGRAM TRACING: CPT

## 2025-03-02 PROCEDURE — 80179 DRUG ASSAY SALICYLATE: CPT

## 2025-03-02 PROCEDURE — 82077 ASSAY SPEC XCP UR&BREATH IA: CPT

## 2025-03-02 PROCEDURE — 80143 DRUG ASSAY ACETAMINOPHEN: CPT

## 2025-03-02 PROCEDURE — 80307 DRUG TEST PRSMV CHEM ANLYZR: CPT

## 2025-03-02 PROCEDURE — 82248 BILIRUBIN DIRECT: CPT

## 2025-03-02 PROCEDURE — 81003 URINALYSIS AUTO W/O SCOPE: CPT

## 2025-03-02 PROCEDURE — 99285 EMERGENCY DEPT VISIT HI MDM: CPT

## 2025-03-02 RX ORDER — OXYCODONE AND ACETAMINOPHEN 10; 325 MG/1; MG/1
1 TABLET ORAL ONCE
Status: COMPLETED | OUTPATIENT
Start: 2025-03-02 | End: 2025-03-02

## 2025-03-02 RX ADMIN — OXYCODONE AND ACETAMINOPHEN 1 TABLET: 10; 325 TABLET ORAL at 21:10

## 2025-03-02 RX ADMIN — OXYCODONE HYDROCHLORIDE AND ACETAMINOPHEN 1 TABLET: 10; 325 TABLET ORAL at 13:39

## 2025-03-02 ASSESSMENT — LIFESTYLE VARIABLES
HOW MANY STANDARD DRINKS CONTAINING ALCOHOL DO YOU HAVE ON A TYPICAL DAY: 1 OR 2
HOW OFTEN DO YOU HAVE A DRINK CONTAINING ALCOHOL: MONTHLY OR LESS

## 2025-03-02 ASSESSMENT — SLEEP AND FATIGUE QUESTIONNAIRES
AVERAGE NUMBER OF SLEEP HOURS: 4
DO YOU USE A SLEEP AID: NO
SLEEP PATTERN: DIFFICULTY FALLING ASLEEP;DISTURBED/INTERRUPTED SLEEP;RESTLESSNESS
DO YOU HAVE DIFFICULTY SLEEPING: YES

## 2025-03-02 ASSESSMENT — PATIENT HEALTH QUESTIONNAIRE - PHQ9
1. LITTLE INTEREST OR PLEASURE IN DOING THINGS: NEARLY EVERY DAY
2. FEELING DOWN, DEPRESSED OR HOPELESS: NEARLY EVERY DAY
SUM OF ALL RESPONSES TO PHQ QUESTIONS 1-9: 6

## 2025-03-02 ASSESSMENT — PAIN - FUNCTIONAL ASSESSMENT: PAIN_FUNCTIONAL_ASSESSMENT: NONE - DENIES PAIN

## 2025-03-02 NOTE — ED NOTES
Patient is resting in bed with easy and unlabored respirations. Call light in reach. Side rails up x2. Patient denies further complaints or concerns. Updated on plan of care. Given food per request. Will monitor.

## 2025-03-02 NOTE — ED NOTES
Patient given food and drink per request. Patient is resting in bed with easy and unlabored respirations. Call light in reach. Side rails up x2. Patient denies further complaints or concerns. Will monitor. Sitter remains at bedside.

## 2025-03-02 NOTE — ED NOTES
Patient is resting in bed with easy and unlabored respirations. Constant observer at bedside. Patient consuming food tray. Call light in reach. Side rails up x2. Patient denies further complaints or concerns. Will monitor.

## 2025-03-02 NOTE — ED TRIAGE NOTES
Pt to ED via EMS w/rprts of need for psychiatric evaluation. Pt comes from assisted living, rprts he had a relapse and used cocaine yesterday. Pt rprts on going suicidal thoughts. States thinking about cutting self, \"cutting his neck\". Pt A&O x4. Breathing easy and unlabored on RA. EMS rprts staring an IV and pt insisted to have line removed. Vitals updated. EKG completed. Patient placed in room with continuous monitoring in place. Provider notified, requested an assessment by behavioral health . Explained suicide prevention precautions to the patient including constant observer.

## 2025-03-03 ENCOUNTER — APPOINTMENT (OUTPATIENT)
Dept: GENERAL RADIOLOGY | Age: 55
End: 2025-03-03
Attending: PSYCHIATRY & NEUROLOGY
Payer: MEDICARE

## 2025-03-03 ENCOUNTER — HOSPITAL ENCOUNTER (INPATIENT)
Age: 55
LOS: 11 days | Discharge: HOME | End: 2025-03-14
Attending: PSYCHIATRY & NEUROLOGY | Admitting: PSYCHIATRY & NEUROLOGY
Payer: MEDICARE

## 2025-03-03 VITALS
DIASTOLIC BLOOD PRESSURE: 90 MMHG | WEIGHT: 200 LBS | RESPIRATION RATE: 16 BRPM | TEMPERATURE: 97.9 F | SYSTOLIC BLOOD PRESSURE: 138 MMHG | HEART RATE: 82 BPM | HEIGHT: 77 IN | OXYGEN SATURATION: 96 % | BODY MASS INDEX: 23.62 KG/M2

## 2025-03-03 DIAGNOSIS — M79.672 FOOT PAIN, LEFT: Primary | ICD-10-CM

## 2025-03-03 DIAGNOSIS — L97.521 CHRONIC FOOT ULCER, LEFT, LIMITED TO BREAKDOWN OF SKIN (HCC): ICD-10-CM

## 2025-03-03 PROBLEM — R45.851 DEPRESSION WITH SUICIDAL IDEATION: Status: ACTIVE | Noted: 2025-03-03

## 2025-03-03 PROBLEM — F32.A DEPRESSION WITH SUICIDAL IDEATION: Status: ACTIVE | Noted: 2025-03-03

## 2025-03-03 LAB
25(OH)D3 SERPL-MCNC: 13.3 NG/ML (ref 30–100)
TSH SERPL DL<=0.05 MIU/L-ACNC: 1.54 UIU/ML (ref 0.27–4.2)

## 2025-03-03 PROCEDURE — 1240000000 HC EMOTIONAL WELLNESS R&B

## 2025-03-03 PROCEDURE — 97165 OT EVAL LOW COMPLEX 30 MIN: CPT

## 2025-03-03 PROCEDURE — 99222 1ST HOSP IP/OBS MODERATE 55: CPT

## 2025-03-03 PROCEDURE — APPSS60 APP SPLIT SHARED TIME 46-60 MINUTES

## 2025-03-03 PROCEDURE — 6370000000 HC RX 637 (ALT 250 FOR IP)

## 2025-03-03 PROCEDURE — 36415 COLL VENOUS BLD VENIPUNCTURE: CPT

## 2025-03-03 PROCEDURE — 97530 THERAPEUTIC ACTIVITIES: CPT

## 2025-03-03 PROCEDURE — 84443 ASSAY THYROID STIM HORMONE: CPT

## 2025-03-03 PROCEDURE — 82306 VITAMIN D 25 HYDROXY: CPT

## 2025-03-03 PROCEDURE — 6370000000 HC RX 637 (ALT 250 FOR IP): Performed by: PSYCHIATRY & NEUROLOGY

## 2025-03-03 PROCEDURE — 99222 1ST HOSP IP/OBS MODERATE 55: CPT | Performed by: PSYCHIATRY & NEUROLOGY

## 2025-03-03 PROCEDURE — 99213 OFFICE O/P EST LOW 20 MIN: CPT

## 2025-03-03 PROCEDURE — 73630 X-RAY EXAM OF FOOT: CPT

## 2025-03-03 RX ORDER — OXYCODONE AND ACETAMINOPHEN 5; 325 MG/1; MG/1
1 TABLET ORAL EVERY 4 HOURS PRN
Status: DISCONTINUED | OUTPATIENT
Start: 2025-03-03 | End: 2025-03-14 | Stop reason: HOSPADM

## 2025-03-03 RX ORDER — CYCLOBENZAPRINE HCL 10 MG
10 TABLET ORAL NIGHTLY PRN
Status: DISCONTINUED | OUTPATIENT
Start: 2025-03-03 | End: 2025-03-14 | Stop reason: HOSPADM

## 2025-03-03 RX ORDER — AMLODIPINE BESYLATE 5 MG/1
5 TABLET ORAL DAILY
Status: DISCONTINUED | OUTPATIENT
Start: 2025-03-03 | End: 2025-03-04

## 2025-03-03 RX ORDER — DULOXETIN HYDROCHLORIDE 60 MG/1
60 CAPSULE, DELAYED RELEASE ORAL DAILY
Status: DISCONTINUED | OUTPATIENT
Start: 2025-03-03 | End: 2025-03-14 | Stop reason: HOSPADM

## 2025-03-03 RX ORDER — IBUPROFEN 400 MG/1
400 TABLET, FILM COATED ORAL EVERY 6 HOURS PRN
Status: DISCONTINUED | OUTPATIENT
Start: 2025-03-03 | End: 2025-03-03

## 2025-03-03 RX ORDER — POLYETHYLENE GLYCOL 3350 17 G
2 POWDER IN PACKET (EA) ORAL
Status: DISCONTINUED | OUTPATIENT
Start: 2025-03-03 | End: 2025-03-14 | Stop reason: HOSPADM

## 2025-03-03 RX ORDER — DOCUSATE SODIUM 100 MG/1
100 CAPSULE, LIQUID FILLED ORAL 2 TIMES DAILY
Status: ON HOLD | COMMUNITY
Start: 2025-02-05 | End: 2025-03-13 | Stop reason: HOSPADM

## 2025-03-03 RX ORDER — RISPERIDONE 0.25 MG/1
0.25 TABLET ORAL DAILY
Status: ON HOLD | COMMUNITY
Start: 2025-01-06 | End: 2025-03-13 | Stop reason: HOSPADM

## 2025-03-03 RX ORDER — POLYETHYLENE GLYCOL 3350 17 G/17G
17 POWDER, FOR SOLUTION ORAL DAILY PRN
Status: DISCONTINUED | OUTPATIENT
Start: 2025-03-03 | End: 2025-03-14 | Stop reason: HOSPADM

## 2025-03-03 RX ORDER — PANTOPRAZOLE SODIUM 40 MG/1
40 TABLET, DELAYED RELEASE ORAL
Status: DISCONTINUED | OUTPATIENT
Start: 2025-03-03 | End: 2025-03-14 | Stop reason: HOSPADM

## 2025-03-03 RX ORDER — LIDOCAINE 50 MG/G
OINTMENT TOPICAL 3 TIMES DAILY PRN
COMMUNITY
Start: 2025-01-21

## 2025-03-03 RX ORDER — METOPROLOL TARTRATE 25 MG/1
25 TABLET, FILM COATED ORAL 2 TIMES DAILY
Status: ON HOLD | COMMUNITY
Start: 2025-02-05 | End: 2025-03-13 | Stop reason: HOSPADM

## 2025-03-03 RX ORDER — IBUPROFEN 600 MG/1
600 TABLET, FILM COATED ORAL EVERY 6 HOURS PRN
Status: DISCONTINUED | OUTPATIENT
Start: 2025-03-03 | End: 2025-03-14 | Stop reason: HOSPADM

## 2025-03-03 RX ORDER — ATORVASTATIN CALCIUM 10 MG/1
10 TABLET, FILM COATED ORAL DAILY
Status: DISCONTINUED | OUTPATIENT
Start: 2025-03-03 | End: 2025-03-14 | Stop reason: HOSPADM

## 2025-03-03 RX ORDER — DOCUSATE SODIUM 100 MG/1
100 CAPSULE, LIQUID FILLED ORAL 2 TIMES DAILY PRN
Status: DISCONTINUED | OUTPATIENT
Start: 2025-03-03 | End: 2025-03-14 | Stop reason: HOSPADM

## 2025-03-03 RX ORDER — OXYCODONE AND ACETAMINOPHEN 10; 325 MG/1; MG/1
1 TABLET ORAL 3 TIMES DAILY PRN
Status: ON HOLD | COMMUNITY
Start: 2025-02-18 | End: 2025-03-13 | Stop reason: HOSPADM

## 2025-03-03 RX ORDER — OXYCODONE AND ACETAMINOPHEN 5; 325 MG/1; MG/1
1 TABLET ORAL EVERY 6 HOURS PRN
Status: DISCONTINUED | OUTPATIENT
Start: 2025-03-03 | End: 2025-03-03

## 2025-03-03 RX ORDER — FOLIC ACID 1 MG/1
1 TABLET ORAL DAILY
Status: DISCONTINUED | OUTPATIENT
Start: 2025-03-03 | End: 2025-03-14 | Stop reason: HOSPADM

## 2025-03-03 RX ORDER — TAMSULOSIN HYDROCHLORIDE 0.4 MG/1
0.4 CAPSULE ORAL DAILY
Status: DISCONTINUED | OUTPATIENT
Start: 2025-03-03 | End: 2025-03-14 | Stop reason: HOSPADM

## 2025-03-03 RX ORDER — ACETAMINOPHEN 325 MG/1
650 TABLET ORAL EVERY 4 HOURS PRN
Status: DISCONTINUED | OUTPATIENT
Start: 2025-03-03 | End: 2025-03-14 | Stop reason: HOSPADM

## 2025-03-03 RX ORDER — TRAZODONE HYDROCHLORIDE 50 MG/1
50 TABLET ORAL NIGHTLY PRN
Status: DISCONTINUED | OUTPATIENT
Start: 2025-03-03 | End: 2025-03-14 | Stop reason: HOSPADM

## 2025-03-03 RX ORDER — GABAPENTIN 400 MG/1
800 CAPSULE ORAL 3 TIMES DAILY
Status: DISCONTINUED | OUTPATIENT
Start: 2025-03-03 | End: 2025-03-14 | Stop reason: HOSPADM

## 2025-03-03 RX ORDER — MAGNESIUM HYDROXIDE/ALUMINUM HYDROXICE/SIMETHICONE 120; 1200; 1200 MG/30ML; MG/30ML; MG/30ML
30 SUSPENSION ORAL EVERY 6 HOURS PRN
Status: DISCONTINUED | OUTPATIENT
Start: 2025-03-03 | End: 2025-03-14 | Stop reason: HOSPADM

## 2025-03-03 RX ORDER — ASPIRIN 81 MG/1
81 TABLET, CHEWABLE ORAL DAILY
Status: DISCONTINUED | OUTPATIENT
Start: 2025-03-03 | End: 2025-03-14 | Stop reason: HOSPADM

## 2025-03-03 RX ORDER — HYDROXYZINE HYDROCHLORIDE 50 MG/1
50 TABLET, FILM COATED ORAL 3 TIMES DAILY PRN
Status: DISCONTINUED | OUTPATIENT
Start: 2025-03-03 | End: 2025-03-14 | Stop reason: HOSPADM

## 2025-03-03 RX ADMIN — TAMSULOSIN HYDROCHLORIDE 0.4 MG: 0.4 CAPSULE ORAL at 11:17

## 2025-03-03 RX ADMIN — OXYCODONE HYDROCHLORIDE AND ACETAMINOPHEN 1 TABLET: 5; 325 TABLET ORAL at 14:44

## 2025-03-03 RX ADMIN — FOLIC ACID 1 MG: 1 TABLET ORAL at 11:17

## 2025-03-03 RX ADMIN — DULOXETINE HYDROCHLORIDE 60 MG: 60 CAPSULE, DELAYED RELEASE ORAL at 14:44

## 2025-03-03 RX ADMIN — ACETAMINOPHEN 650 MG: 325 TABLET ORAL at 02:21

## 2025-03-03 RX ADMIN — GABAPENTIN 800 MG: 400 CAPSULE ORAL at 11:17

## 2025-03-03 RX ADMIN — GABAPENTIN 800 MG: 400 CAPSULE ORAL at 20:36

## 2025-03-03 RX ADMIN — AMLODIPINE BESYLATE 5 MG: 5 TABLET ORAL at 09:40

## 2025-03-03 RX ADMIN — PANTOPRAZOLE SODIUM 40 MG: 40 TABLET, DELAYED RELEASE ORAL at 08:30

## 2025-03-03 RX ADMIN — ATORVASTATIN CALCIUM 10 MG: 10 TABLET, FILM COATED ORAL at 11:18

## 2025-03-03 RX ADMIN — IBUPROFEN 400 MG: 400 TABLET, FILM COATED ORAL at 11:18

## 2025-03-03 RX ADMIN — IBUPROFEN 600 MG: 600 TABLET, FILM COATED ORAL at 18:08

## 2025-03-03 RX ADMIN — GABAPENTIN 800 MG: 400 CAPSULE ORAL at 14:44

## 2025-03-03 RX ADMIN — DOCUSATE SODIUM 100 MG: 100 CAPSULE, LIQUID FILLED ORAL at 18:08

## 2025-03-03 RX ADMIN — ASPIRIN 81 MG: 81 TABLET, CHEWABLE ORAL at 11:18

## 2025-03-03 RX ADMIN — OXYCODONE HYDROCHLORIDE AND ACETAMINOPHEN 1 TABLET: 5; 325 TABLET ORAL at 18:33

## 2025-03-03 ASSESSMENT — LIFESTYLE VARIABLES
HOW OFTEN DO YOU HAVE A DRINK CONTAINING ALCOHOL: PATIENT DECLINED
HOW MANY STANDARD DRINKS CONTAINING ALCOHOL DO YOU HAVE ON A TYPICAL DAY: PATIENT DECLINED
HOW OFTEN DO YOU HAVE A DRINK CONTAINING ALCOHOL: PATIENT DECLINED
HOW MANY STANDARD DRINKS CONTAINING ALCOHOL DO YOU HAVE ON A TYPICAL DAY: PATIENT DECLINED
HOW OFTEN DO YOU HAVE A DRINK CONTAINING ALCOHOL: PATIENT DECLINED
HOW MANY STANDARD DRINKS CONTAINING ALCOHOL DO YOU HAVE ON A TYPICAL DAY: PATIENT DECLINED

## 2025-03-03 ASSESSMENT — SLEEP AND FATIGUE QUESTIONNAIRES
DO YOU USE A SLEEP AID: NO
AVERAGE NUMBER OF SLEEP HOURS: 4
DO YOU HAVE DIFFICULTY SLEEPING: YES
SLEEP PATTERN: DIFFICULTY FALLING ASLEEP;DISTURBED/INTERRUPTED SLEEP;RESTLESSNESS

## 2025-03-03 ASSESSMENT — PATIENT HEALTH QUESTIONNAIRE - PHQ9
SUM OF ALL RESPONSES TO PHQ QUESTIONS 1-9: 2
1. LITTLE INTEREST OR PLEASURE IN DOING THINGS: SEVERAL DAYS
2. FEELING DOWN, DEPRESSED OR HOPELESS: SEVERAL DAYS
SUM OF ALL RESPONSES TO PHQ QUESTIONS 1-9: 2

## 2025-03-03 ASSESSMENT — PAIN DESCRIPTION - LOCATION
LOCATION: LEG;FOOT
LOCATION: BACK;FOOT
LOCATION: BACK;FOOT
LOCATION: LEG;FOOT
LOCATION: LEG;FOOT

## 2025-03-03 ASSESSMENT — PAIN SCALES - GENERAL
PAINLEVEL_OUTOF10: 10
PAINLEVEL_OUTOF10: 10
PAINLEVEL_OUTOF10: 8
PAINLEVEL_OUTOF10: 9
PAINLEVEL_OUTOF10: 6
PAINLEVEL_OUTOF10: 0
PAINLEVEL_OUTOF10: 8
PAINLEVEL_OUTOF10: 10
PAINLEVEL_OUTOF10: 0
PAINLEVEL_OUTOF10: 9

## 2025-03-03 ASSESSMENT — PAIN - FUNCTIONAL ASSESSMENT: PAIN_FUNCTIONAL_ASSESSMENT: PREVENTS OR INTERFERES SOME ACTIVE ACTIVITIES AND ADLS

## 2025-03-03 ASSESSMENT — PAIN SCALES - WONG BAKER: WONGBAKER_NUMERICALRESPONSE: NO HURT

## 2025-03-03 ASSESSMENT — PAIN DESCRIPTION - DESCRIPTORS
DESCRIPTORS: ACHING;DISCOMFORT
DESCRIPTORS: ACHING;DISCOMFORT

## 2025-03-03 ASSESSMENT — PAIN DESCRIPTION - ORIENTATION
ORIENTATION: LEFT
ORIENTATION: RIGHT
ORIENTATION: LEFT;RIGHT

## 2025-03-03 NOTE — PROGRESS NOTES
RT ASSESSMENT TREATMENT GOALS    [x]Pt Goal:  Pt will identify 1-2 positive coping skills by time of discharge.    [x]Pt Goal:  Pt will identify 1-2 positive aspects of self by time of discharge.    []Pt Goal:  Pt will remain on task/topic for 15-30 minutes during group by time of discharge.    [x]Pt Goal:  Pt will identify 1-2 aspects of relapse prevention plan by time of discharge.    []Pt Goal:  Pt will join in conversation with peers 1-2 times per group by time of discharge.    []Pt Goal:  Pt will identify 1-2 new leisure interests by time of discharge.    []Pt Goal: Pt will maintain behavorial control until the time of discharge.
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
## 2025-03-03 NOTE — PROGRESS NOTES
[facility]  Consult Note       NAME:  Asim Back  MEDICAL RECORD NUMBER:  [id number]  AGE: 55 y.o.   GENDER: male  : 1970  TODAY'S DATE:  3/3/2025    Subjective:      Asim Back is a 55 y.o. male with inpatient referral to [facility] for:  Left TMA wound      Wound Identification:  Wound Type: non-healing surgical  Contributing Factors: decreased mobility, decreased tissue oxygenation, poor hygiene, and non-adherence    Wound History: Long hx of non-healing TMA of the left foot with dx of chronic osteomyelitis. TMA appears to have occurred in . Pt states healing issues since surgery. Pt stump is extremely tender to touch. Pt did not want wound area touched d/t pain. Stump left KIANA at this time. Limited assessment obtained.   Current Wound Care Treatment:  none    Patient Goal of Care:  [x] Wound Healing  [] Odor Control  [] Palliative Care  [] Pain Control   [x] Other: infection prevention        PAST MEDICAL HISTORY        Diagnosis Date    Hypertension        PAST SURGICAL HISTORY    Past Surgical History:   Procedure Laterality Date    DEBRIDEMENT Left     Left foot       FAMILY HISTORY    No family history on file.    SOCIAL HISTORY    Social History     Tobacco Use    Smoking status: Every Day     Current packs/day: 0.50     Types: Cigarettes    Smokeless tobacco: Never   Substance Use Topics    Alcohol use: Yes    Drug use: No         ALLERGIES    Allergies   Allergen Reactions    Lisinopril     Penicillins        HOME MEDICATIONS  Prior to Admission medications    Medication Sig Start Date End Date Taking? Authorizing Provider   docusate sodium (COLACE) 100 MG capsule Take 1 capsule by mouth 2 times daily 25   Edith Santo MD   Lactobacillus (ACIDOPHILUS PROBIOTIC PO) Take 1 tablet by mouth daily 25   Edith Santo MD   lidocaine (XYLOCAINE) 5 % ointment Apply topically 3 times daily as needed for Pain 25   Provider  Total Pressure Relief  [] Other:     Current Diet: ADULT DIET; Regular    Patient/Caregiver Teaching:  Level of patientunderstanding able to:     [x] Indicates understanding       [x] Needs reinforcement  [] Unsuccessful      [] Verbal Understanding  [] Demonstrated understanding       [] No evidence of learning  [x] Refused teaching         [] N/A       Electronically signed by Pat Johnson RN on  3/3/2025 at 12:23 PM

## 2025-03-03 NOTE — H&P
Department of Psychiatry  Attending Physician Psychiatric Assessment   Patient: Asim Back  MRN: [id number]  Reason for Admission to Psychiatric Unit:  Threat to self requiring 24 hour professional observation  Concerns about patient's safety in the community  Past Mental Health Diagnosis: a history of  Major Depression and Prior suicide attempt  Triggering event or precipitating factor: Housing instability  Length of time/duration of triggering event: Months  Legal Status: Voluntary    CHIEF COMPLAINT:  Suicidal ideation, depression     History obtained from: Patient, electronic medical record          HISTORY OF PRESENT ILLNESS:    Asim Back is a 55 y.o. male who has a past medical history of major depressive disorder, COPD, HTN, paraplegia, and chronic pain syndrome. Patient presented to the ED for depression with suicidal ideation. He expressed active suicidal ideation with a plan to cut his throat and does not feel safe from self. He is admitted voluntarily.     On approach, patient is cooperative and tearful. He is alert and oriented x4. His mood is sad, depressed, and helpless. Affect is congruent to mood. Grooming is unkempt as he is unable to get into his wheelchair due to pain. His thought process is linear and goal-directed. His speech is normal rate and volume. He is wheelchair bound with unilateral foot amputation and paraplegia.     Patient reports an increase in depression due to housing and relationship issues. He endorses hopelessness, helplessness, isolative, loss of interest, and tearfulness. He reports suicidal thoughts with previous plan to cut his throat. He continues to have suicidal ideation. He reports having depression majority of his life. He has had multiple depressive episodes lasting weeks to months. Patient unable to elicit a manic or hypomanic episode. He denies anxiety. He denies perceptual disturbances. He denies alcohol use but reports sniffing cocaine at the apartment tour.  Management: close watch per standard protocol      Psychotherapy: participation in milieu and group and individual sessions with Attending Physician,  and Physician Assistant/CNP      Estimated length of stay:  2-14 days      GENERAL PATIENT/FAMILY EDUCATION  Patient will understand basic signs and symptoms, patient will understand benefits/risks and potential side effects from proposed medications, and patient will understand their role in recovery.  Family is not active in patient's care.   Patient assets that may be helpful during treatment include: Intent to participate and engage in treatment, sufficient fund of knowledge and intellect to understand and utilize treatments.    OUTCOME QUESTIONNAIRE (OQ 30.2):  [x] Completed [] Patient too ill to participate    Goals:    1) Remission of suicidal ideation.  2) Stabilization of symptoms prior to discharge.  3) Establish efficacy and tolerability of medications.       Behavioral Services  Medicare Certification     Admission Day 1  I certify that this patient's inpatient psychiatric hospital admission is medically necessary for:    x (1) treatment which could reasonably be expected to improve this patient's condition, or    x (2) diagnostic study or its equivalent.     --MICHELLE Kaiser CNP on 3/3/2025 at 2:35 PM    An electronic signature was used to authenticate this note.     Please note that this chart was generated using voice recognition Dragon dictation software.  Although every effort was made to ensure the accuracy of this automated transcription, some errors in transcription may have occurred.   I independently saw and evaluated the patient.  I reviewed the nurse practitioners documentation above.  Principle diagnosis we are treating for is MDD (major depressive disorder), recurrent severe, without psychosis (HCC). Any additional comments or changes to the nurse practitioners documentation are stated below otherwise agree with assessment.
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
## 2025-03-03 NOTE — ED NOTES
Bedside report taken from OTONIEL Yañez. Pt resting in bed with denied needs at this time. Constant observer at bedside.

## 2025-03-03 NOTE — GROUP NOTE
Psych-Ed/Relapse Prevention Group Note        Date: March 3, 2025 Start Time: 11am  End Time: 11:45am      Number of Participants in Group & Unit Census:  7/13    Topic: Socialization    Goal of Group:Patient will demonstrate improved interpersonal skills and offer peer support.        Comments:     Patient did not participate in Psych-Ed/Relapse Prevention group, despite staff encouragement and explanation of benefits.  Patient remain seclusive to self.  Q15 minute safety checks maintained for patient safety and will continue to encourage patient to attend unit programming.         Signature:  Karime Kim CTRS

## 2025-03-03 NOTE — BH NOTE
Patient given tobacco quitline number 18498804758 at this time, refusing to call at this time, patient given information as to the dangers of long term tobacco use. Continue to reinforce the importance of tobacco cessation.

## 2025-03-03 NOTE — BH NOTE
[facility]  Admission Note     Admission Type:   Voluntary     Reason for admission:  Reason for Admission: Patient is a 55-year-old male with a history of bipolar disorder, hypertension, COPD, and quadriplegia who presents to [facility] ED with a chief complaint of suicidal ideation.  He reports that he has had suicidal ideation in the past.  Symptoms began around February 14, 2025, when he was trying to apply for an apartment in [facility], but found out that \"they had roaches and were selling crack\".  He is currently residing at a senior living center, where he reports that he is the only person his age at the facility.  He reports that he recently lost his only friend at the facility.  He also reports troubles with his girlfriend.  He recently moved here from [place], and is frustrated that he does not have any friends.  He endorses plans for cutting his throat with a knife that he has.  He also reports that he has had a headache, which he attributes to not having his prescribed Percocet.  He also endorses shortness of breath, which he attributes to his current mental state.  He requested to be admitted to the inpatient psychiatric unit \"to get his head straight\".      Addictive Behavior:   Addictive Behavior  In the Past 3 Months, Have You Felt or Has Someone Told You That You Have a Problem With  : None    Medical Problems:   Past Medical History:   Diagnosis Date    Hypertension        Status EXAM:  Mental Status and Behavioral Exam  Normal: No  Level of Assistance: Partial assist  Facial Expression: Flat  Affect: Blunt  Level of Consciousness: Alert  Frequency of Checks: 4 times per hour, close  Mood:Normal: No  Mood: Depressed, Empty, Guilty, Helpless, Irritable  Motor Activity:Normal: No  Motor Activity: Decreased, Unusual posture/gait  Eye Contact: Fair  Observed Behavior: Withdrawn, Cooperative, Guarded, Preoccupied  Sexual Misconduct History: Current - no  Preception: [place] to person,  Sweater  Other Valuables: Money, Keys, Lighter/Matches, Cigarettes, Home Medical Equipment  The following personal items were collected during admission. Items secured in locker/safe. Items will be returned to patient at discharge.     RADHA SPANGLER RN

## 2025-03-03 NOTE — H&P
tobacco.  Alcohol:      reports current alcohol use.  Drug Use:  reports no history of drug use.    Family History:     No family history on file.    Review of Systems:     Positive and Negative as described in HPI.    CONSTITUTIONAL:  negative for fevers, chills, sweats, fatigue, weight loss  HEENT:  negative for vision, hearing changes, runny nose, throat pain  RESPIRATORY:  negative for shortness of breath, cough, congestion, wheezing.  CARDIOVASCULAR:  negative for chest pain, palpitations.  GASTROINTESTINAL:  negative for nausea, vomiting, diarrhea, constipation, change in bowel habits, abdominal pain   GENITOURINARY:  negative for difficulty of urination, burning with urination, frequency   INTEGUMENT:  negative for rash, skin lesions, easy bruising   HEMATOLOGIC/LYMPHATIC:  negative for swelling/edema   ALLERGIC/IMMUNOLOGIC:  negative for urticaria , itching  ENDOCRINE:  negative increase in drinking, increase in urination, hot or cold intolerance  MUSCULOSKELETAL:  negative joint pains, muscle aches, swelling of joints  NEUROLOGICAL:  negative for headaches, dizziness, lightheadedness, numbness, pain, tingling extremities      Physical Exam:     BP (!) 138/94   Pulse 83   Temp 98.2 °F (36.8 °C) (Temporal)   Resp 16   Ht 1.956 m (6' 5\")   Wt 90.7 kg (200 lb)   SpO2 96%   BMI 23.72 kg/m²   Temp (24hrs), Av.1 °F (36.7 °C), Min:98 °F (36.7 °C), Max:98.2 °F (36.8 °C)    No results for input(s): \"POCGLU\" in the last 72 hours.  No intake or output data in the 24 hours ending 25 9435    General Appearance:  alert, well appearing, and in no acute distress  Mental status: oriented to person, place, and time   Head:  normocephalic, atraumatic.  Neck: supple, no carotid bruits, thyroid not palpable  Lungs: Bilateral equal air entry, clear to ausculation, no wheezing, rales or rhonchi, normal effort  Cardiovascular: normal rate, regular rhythm, no murmur, gallop, rub.  Abdomen: Soft, nontender,  nondistended, normal bowel sounds, no hepatomegaly or splenomegaly  Neurologic: CN II-XII intact , DTR normal, no new focal motor or sensory deficits, moving all extremities spontaneously.   Skin: No gross lesions, rashes, bruising or bleeding on exposed skin area  Extremities: Left metatarsal amputation    Investigations:      Laboratory Testing:  Recent Results (from the past 24 hour(s))   TSH reflex to FT4    Collection Time: 03/03/25  1:07 PM   Result Value Ref Range    TSH 1.54 0.27 - 4.20 uIU/mL       Imaging/Diagonstics:  No results found.    Assessment :      Hospital Problems             Last Modified POA    * (Principal) MDD (major depressive disorder), recurrent severe, without psychosis (HCC) 3/3/2025 Yes    Depression with suicidal ideation 3/3/2025 Yes       Plan:     Admitted to inpatient psych with depression with suicidal ideation.  Hypertension.  Continue amlodipine.  Blood pressure controlled.  GERD.  Protonix.  Hyperlipidemia.  Continue statin.  Tobacco abuse.  Advised cessation.  Nicotine patch.  History of left metatarsal amputation.  Open wound noted.  Will get x-ray of the foot.  Podiatry consult.  Wound care.  Does not seem infected.  Will continue to monitor.  Check vitamin D, TSH.  Labs and medications reviewed.     DVT prophylaxis,  Pt mobile   Full code status       Consultations:   IP CONSULT TO INTERNAL MEDICINE  IP CONSULT TO PODIATRY      Dez Figueroa MD  3/3/2025  5:15 PM    Copy sent to Dr. Agrawal, Vinita Johnston, APRN - NP    Please note that this chart was generated using voice recognition Dragon dictation software.  Although every effort was made to ensure the accuracy of this automated transcription, some errors in transcription may have occurred.

## 2025-03-03 NOTE — PROGRESS NOTES
Pharmacy Medication History Note      List of current medications patient is taking is complete.    Source of information: Epic, PDMP, [employer] dispense report    Changes made to medication list:  Medications removed (include reason, ex. therapy complete or physician discontinued, noncompliance):  Oxycodone IR (list clean up - now filling Oxycodone/Acetaminophen)    Medications flagged for provider review:  Aripiprazole and Risperidone - filled the same day and prescribed by the same provider (Vinita Agrawal, MICHELLE - NP)   Dulera inhaler - filled one time in December 2024    Medications added/doses adjusted:  Added Oxycodone/Acetaminophen 10/325 gm three times daily as needed - LF 2/18/25 (qty 45 for 15 ds)  Adjusted Cyclobenzaprine to 10 mg nightly as needed  Added Docusate 100 mg twice daily as needed  Adjusted Ibuprofen 800 mg to twice daily as needed  Added probiotic daily  Added Lidocaine 5% ointment topically three times daily as needed for pain  Added Metoprolol tartrate 25 mg twice daily  Added Risperidone 0.25 mg daily      Current Home Medication List at Time of Admission:  Prior to Admission medications    Medication Sig   docusate sodium (COLACE) 100 MG capsule Take 1 capsule by mouth 2 times daily   Lactobacillus (ACIDOPHILUS PROBIOTIC PO) Take 1 tablet by mouth daily   lidocaine (XYLOCAINE) 5 % ointment Apply topically 3 times daily as needed for Pain   metoprolol tartrate (LOPRESSOR) 25 MG tablet Take 1 tablet by mouth 2 times daily   oxyCODONE-acetaminophen (PERCOCET)  MG per tablet Take 1 tablet by mouth 3 times daily as needed (pain greater than 7).   risperiDONE (RISPERDAL) 0.25 MG tablet Take 1 tablet by mouth daily   nicotine (NICODERM CQ) 14 MG/24HR Place 1 patch onto the skin daily   albuterol sulfate HFA (VENTOLIN HFA) 108 (90 Base) MCG/ACT inhaler Inhale 2 puffs into the lungs 4 times daily as needed for Wheezing   traZODone (DESYREL) 50 MG tablet Take 1 tablet by mouth  nightly as needed for Sleep   atorvastatin (LIPITOR) 10 MG tablet Take 1 tablet by mouth nightly   ARIPiprazole (ABILIFY) 5 MG tablet Take 1 tablet by mouth nightly   gabapentin (NEURONTIN) 800 MG tablet Take 1 tablet by mouth 3 times daily.   vitamin D3 (CHOLECALCIFEROL) 25 MCG (1000 UT) TABS tablet Take 1 tablet by mouth daily   DULoxetine (CYMBALTA) 30 MG extended release capsule Take 3 capsules by mouth daily   folic acid (FOLVITE) 400 MCG tablet Take 1 tablet by mouth daily   ibuprofen (ADVIL;MOTRIN) 800 MG tablet Take 1 tablet by mouth 2 times daily as needed for Pain   omeprazole (PRILOSEC) 40 MG delayed release capsule Take 1 capsule by mouth daily   amLODIPine (NORVASC) 10 MG tablet Take 1 tablet by mouth daily   aspirin 81 MG chewable tablet Take 1 tablet by mouth daily   cyclobenzaprine (FLEXERIL) 10 MG tablet Take 1 tablet by mouth nightly as needed for Muscle spasms   tamsulosin (FLOMAX) 0.4 MG capsule Take 1 capsule by mouth daily         Please let me know if you have any questions about this encounter. Thank you!    Electronically signed by Desmond Miller RPH on 3/3/2025 at 9:01 AM

## 2025-03-03 NOTE — CARE COORDINATION
BHI Biopsychosocial Assessment    Current Level of Psychosocial Functioning     Independent XX  Dependent    Minimal Assist     Comments:    Psychosocial High Risk Factors (check all that apply)    Unable to obtain meds   Chronic illness/pain    Substance abuse   Lack of Family Support   Financial stress   Isolation   Inadequate Community Resources XX  Suicide attempt(s)   Not taking medications   Victim of crime   Developmental Delay   Unable to manage personal needs    Age 65 or older   Homeless   No transportation   Readmission within 30 days   Unemployment   Traumatic Event     Comments:   Psychiatric Advanced Directives: n/a    Family to Involve in Treatment: denies    Sexual Orientation: n/a     Patient Strengths: Patient has housing, income, insurance    Patient Barriers: not linked to [facility]      Opiate Education Provided: n/a       CMHC/mental health history: Not linked    Plan of Care   medication management, group/individual therapies, family meetings, psycho -education, treatment team meetings to assist with stabilization    Initial Discharge Plan: Return home and link to [facility]        Clinical Summary: Patient is a 55 year old male admitted to [facility] for suicidal ideation. Patient has a hx of prior admissions. Patient is focused on pain medications during assessment and not redirectable. Patient has stable housing. Patient has income and insurance. Patient denies being linked to [facility]. Social work to link patient in the [place] area. Social work to provide support and assist with discharge planning.           fall/impaired judgment/safety awareness/cognitive impairment

## 2025-03-03 NOTE — GROUP NOTE
Group Therapy Note    Date: 3/3/2025    Group Start Time: 1000  Group End Time: 1030  Group Topic: Group Documentation    [facility]    Saundra Layne LPN        Group Therapy Note     Patient did not participate in Goals  group, despite staff encouragement and explanation of benefits.  Patient remain seclusive to self.  Q15 minute safety checks maintained for patient safety and will continue to encourage patient to attend unit programming.      Signature:  Saundra Layne LPN

## 2025-03-03 NOTE — PROGRESS NOTES
1914  Call to [facility], consulted with Nurse Cohen for and update. Nurse Cohen will have the Behavioral Nurse follow up with the MARCEL[facility].   
2144  Call from Nurse Miriam of [facility] who has [place] on the line with medical questions, call transferred to consult with ED Nurse.    Pt accepted at [facility].    2200  Dr. Cordova updated, completed [facility]..    2202  Pt updated.    2210  EMC faxed to [facility].    
Call from Dr. Iqbal. Pt urine has been sent. Is medically cleared.  
pain and headaches so he took them when he wasn't supposed to. Pt also reports he relapsed from cocaine yesterday and was drinking alcohol this morning.     Provider Recommendation Information  Level of Care Disposition:      1225--attempted to locate Dr. Iqbal's resident Dr. Queen, he is with other patients. Spoke with pt's RN Pedro and updated him about pt's suicide risk and that assessment is completed.  PC from Dr. Queen; he requested we get the process started for getting pt admitted to psych once labs are done. SW confirmed that's what SW is waiting on as well.   Waiting on lab results before calling psych.     1327--PC to Dr. Iqbal to advise pt has cath but we will need UDS prior to calling psych. She said ok.     Patient is medically stabilized. Labs resulted and UDS indicates cocaine use.     1538--PC to Dr. Hough for consult. Dr. Hough asked that SW call the unit to see if they have space for pt given his specific bed/room needs.     PC to the unit, spoke with Yohana, as Claudia (charge) was not available. They will call SW back about whether or not they have space/can facilitated due to bed needs.     1608--PC to , spoke with Yohana for update. She said Dr. Hough was going to call [facility] back, as patient will need to go elsewhere due to lack of staffing.     1615--PC to [facility]. Spoke with Kelly; requested [facility] for this pt.     1630--Updated OTONIEL Vasquez.     1800--Spoke with Art Shrestha. He's been in touch with Dr. Mace,, and then contacted Dr. Hough as well. He's still waiting to hear back about any decision.

## 2025-03-03 NOTE — PROGRESS NOTES
Physical Therapy        Physical Therapy Cancel Note      DATE: 3/3/2025    NAME: Asim Back  MRN: [id number]   : 1970      Patient not seen this date for Physical Therapy due to:    3-3-2025 at 1446:  Patient refused PT evaluation today.  Patient was working w/ OT Telma and reported that he was in too much pain today.  Patient told OT Telma to tell PT not to come today and to try assess him tomorrow.       Electronically signed by Jalyn Kirk PT on 3/3/2025 at 2:46 PM

## 2025-03-03 NOTE — BH NOTE
[facility]  Initial Interdisciplinary Treatment Plan Note      Original treatment plan Date & Time: 3/3/25  1300    Admission Type:  Admission Type: Voluntary    Reason for admission:   Reason for Admission: Patient is a 55-year-old male with a history of bipolar disorder, hypertension, COPD, and quadriplegia who presents to [facility] ED with a chief complaint of suicidal ideation.  He reports that he has had suicidal ideation in the past.  Symptoms began around February 14, 2025, when he was trying to apply for an apartment in [facility], but found out that \"they had roaches and were selling crack\".  He is currently residing at a senior living center, where he reports that he is the only person his age at the facility.  He reports that he recently lost his only friend at the facility.  He also reports troubles with his girlfriend.  He recently moved here from [place], and is frustrated that he does not have any friends.  He endorses plans for cutting his throat with a knife that he has.  He also reports that he has had a headache, which he attributes to not having his prescribed Percocet.  He also endorses shortness of breath, which he attributes to his current mental state.  He requested to be admitted to the inpatient psychiatric unit \"to get his head straight\".    Estimated Length of Stay:  5-7days  Estimated Discharge Date: To be determined by physician.    PATIENT STRENGTHS:  Patient Strengths:   Patient Strengths and Limitations:   Addictive Behavior: Addictive Behavior  In the Past 3 Months, Have You Felt or Has Someone Told You That You Have a Problem With  : None  Medical Problems:  Past Medical History:   Diagnosis Date    Hypertension      Status EXAM:Mental Status and Behavioral Exam  Normal: No  Level of Assistance: Partial assist  Facial Expression: Flat  Affect: Blunt  Level of Consciousness: Alert  Frequency of Checks: 4 times per hour, close  Mood:Normal: No  Mood: Depressed, Empty,

## 2025-03-03 NOTE — PLAN OF CARE
Problem: Self Harm/Suicidality  Goal: Will have no self-injury during hospital stay  Description: INTERVENTIONS:  1.  Ensure constant observer at bedside with Q15M safety checks  2.  Maintain a safe environment  3.  Secure patient belongings  4.  Ensure family/visitors adhere to safety recommendations  5.  Ensure safety tray has been added to patient's diet order  6.  Every shift and PRN: Re-assess suicidal risk via Frequent Screener    Outcome: Progressing  Note: Patient reports not having any thoughts of self harming or to harm others. Patient was able to contract to safety. Q15 minute checks continues for safety.     Problem: Risk for Elopement  Goal: Patient will not exit the unit/facility without proper excort  Outcome: Progressing  Flowsheets (Taken 3/3/2025 1527)  Nursing Interventions for Elopement Risk: Reduce environmental triggers  Note: Patient denies wanting to leave prior to order of discharge from Provider. No elopement behaviors noted or reported per shift.

## 2025-03-04 LAB
CHOLEST SERPL-MCNC: 124 MG/DL (ref 0–199)
CHOLESTEROL/HDL RATIO: 3.3
EST. AVERAGE GLUCOSE BLD GHB EST-MCNC: 111 MG/DL
HBA1C MFR BLD: 5.5 % (ref 4–6)
HDLC SERPL-MCNC: 38 MG/DL
LDLC SERPL CALC-MCNC: 71 MG/DL (ref 0–100)
TRIGL SERPL-MCNC: 74 MG/DL (ref 0–149)

## 2025-03-04 PROCEDURE — APPSS30 APP SPLIT SHARED TIME 16-30 MINUTES

## 2025-03-04 PROCEDURE — 6370000000 HC RX 637 (ALT 250 FOR IP): Performed by: PSYCHIATRY & NEUROLOGY

## 2025-03-04 PROCEDURE — 99232 SBSQ HOSP IP/OBS MODERATE 35: CPT

## 2025-03-04 PROCEDURE — 80061 LIPID PANEL: CPT

## 2025-03-04 PROCEDURE — 83036 HEMOGLOBIN GLYCOSYLATED A1C: CPT

## 2025-03-04 PROCEDURE — 1240000000 HC EMOTIONAL WELLNESS R&B

## 2025-03-04 PROCEDURE — 36415 COLL VENOUS BLD VENIPUNCTURE: CPT

## 2025-03-04 PROCEDURE — 6370000000 HC RX 637 (ALT 250 FOR IP)

## 2025-03-04 PROCEDURE — 99232 SBSQ HOSP IP/OBS MODERATE 35: CPT | Performed by: PSYCHIATRY & NEUROLOGY

## 2025-03-04 RX ORDER — AMLODIPINE BESYLATE 10 MG/1
10 TABLET ORAL DAILY
Status: DISCONTINUED | OUTPATIENT
Start: 2025-03-05 | End: 2025-03-14 | Stop reason: HOSPADM

## 2025-03-04 RX ORDER — HYDRALAZINE HYDROCHLORIDE 10 MG/1
10 TABLET, FILM COATED ORAL EVERY 8 HOURS PRN
Status: DISCONTINUED | OUTPATIENT
Start: 2025-03-04 | End: 2025-03-14 | Stop reason: HOSPADM

## 2025-03-04 RX ORDER — ERGOCALCIFEROL 1.25 MG/1
50000 CAPSULE, LIQUID FILLED ORAL WEEKLY
Status: DISCONTINUED | OUTPATIENT
Start: 2025-03-04 | End: 2025-03-14 | Stop reason: HOSPADM

## 2025-03-04 RX ADMIN — GABAPENTIN 800 MG: 400 CAPSULE ORAL at 08:42

## 2025-03-04 RX ADMIN — NICOTINE POLACRILEX 2 MG: 2 LOZENGE ORAL at 17:52

## 2025-03-04 RX ADMIN — DOCUSATE SODIUM 100 MG: 100 CAPSULE, LIQUID FILLED ORAL at 11:06

## 2025-03-04 RX ADMIN — HYDRALAZINE HYDROCHLORIDE 10 MG: 10 TABLET, FILM COATED ORAL at 13:52

## 2025-03-04 RX ADMIN — OXYCODONE HYDROCHLORIDE AND ACETAMINOPHEN 1 TABLET: 5; 325 TABLET ORAL at 01:25

## 2025-03-04 RX ADMIN — FOLIC ACID 1 MG: 1 TABLET ORAL at 08:42

## 2025-03-04 RX ADMIN — OXYCODONE HYDROCHLORIDE AND ACETAMINOPHEN 1 TABLET: 5; 325 TABLET ORAL at 23:45

## 2025-03-04 RX ADMIN — ATORVASTATIN CALCIUM 10 MG: 10 TABLET, FILM COATED ORAL at 08:42

## 2025-03-04 RX ADMIN — PANTOPRAZOLE SODIUM 40 MG: 40 TABLET, DELAYED RELEASE ORAL at 06:29

## 2025-03-04 RX ADMIN — OXYCODONE HYDROCHLORIDE AND ACETAMINOPHEN 1 TABLET: 5; 325 TABLET ORAL at 12:01

## 2025-03-04 RX ADMIN — GABAPENTIN 800 MG: 400 CAPSULE ORAL at 13:52

## 2025-03-04 RX ADMIN — POLYETHYLENE GLYCOL 3350 17 G: 17 POWDER, FOR SOLUTION ORAL at 17:43

## 2025-03-04 RX ADMIN — GABAPENTIN 800 MG: 400 CAPSULE ORAL at 19:48

## 2025-03-04 RX ADMIN — ERGOCALCIFEROL 50000 UNITS: 1.25 CAPSULE ORAL at 12:20

## 2025-03-04 RX ADMIN — ACETAMINOPHEN 650 MG: 325 TABLET ORAL at 17:43

## 2025-03-04 RX ADMIN — TAMSULOSIN HYDROCHLORIDE 0.4 MG: 0.4 CAPSULE ORAL at 08:42

## 2025-03-04 RX ADMIN — AMLODIPINE BESYLATE 5 MG: 5 TABLET ORAL at 08:42

## 2025-03-04 RX ADMIN — OXYCODONE HYDROCHLORIDE AND ACETAMINOPHEN 1 TABLET: 5; 325 TABLET ORAL at 08:00

## 2025-03-04 RX ADMIN — OXYCODONE HYDROCHLORIDE AND ACETAMINOPHEN 1 TABLET: 5; 325 TABLET ORAL at 19:48

## 2025-03-04 RX ADMIN — ASPIRIN 81 MG: 81 TABLET, CHEWABLE ORAL at 08:42

## 2025-03-04 RX ADMIN — OXYCODONE HYDROCHLORIDE AND ACETAMINOPHEN 1 TABLET: 5; 325 TABLET ORAL at 15:58

## 2025-03-04 RX ADMIN — DULOXETINE HYDROCHLORIDE 60 MG: 60 CAPSULE, DELAYED RELEASE ORAL at 08:42

## 2025-03-04 ASSESSMENT — PAIN DESCRIPTION - ORIENTATION
ORIENTATION: LEFT;RIGHT
ORIENTATION: LEFT;RIGHT
ORIENTATION: LEFT

## 2025-03-04 ASSESSMENT — PAIN SCALES - GENERAL
PAINLEVEL_OUTOF10: 10
PAINLEVEL_OUTOF10: 10
PAINLEVEL_OUTOF10: 9
PAINLEVEL_OUTOF10: 0
PAINLEVEL_OUTOF10: 8
PAINLEVEL_OUTOF10: 2
PAINLEVEL_OUTOF10: 9
PAINLEVEL_OUTOF10: 4
PAINLEVEL_OUTOF10: 10
PAINLEVEL_OUTOF10: 9
PAINLEVEL_OUTOF10: 7

## 2025-03-04 ASSESSMENT — PAIN - FUNCTIONAL ASSESSMENT
PAIN_FUNCTIONAL_ASSESSMENT: ACTIVITIES ARE NOT PREVENTED
PAIN_FUNCTIONAL_ASSESSMENT: ACTIVITIES ARE NOT PREVENTED
PAIN_FUNCTIONAL_ASSESSMENT: PREVENTS OR INTERFERES SOME ACTIVE ACTIVITIES AND ADLS

## 2025-03-04 ASSESSMENT — PAIN SCALES - WONG BAKER: WONGBAKER_NUMERICALRESPONSE: HURTS A LITTLE BIT

## 2025-03-04 ASSESSMENT — PAIN DESCRIPTION - LOCATION
LOCATION: FOOT
LOCATION: HEAD
LOCATION: LEG
LOCATION: LEG

## 2025-03-04 ASSESSMENT — PAIN DESCRIPTION - DESCRIPTORS
DESCRIPTORS: THROBBING
DESCRIPTORS: BURNING;DISCOMFORT;ACHING
DESCRIPTORS: ACHING;BURNING;DISCOMFORT
DESCRIPTORS: ACHING

## 2025-03-04 ASSESSMENT — LIFESTYLE VARIABLES
HOW OFTEN DO YOU HAVE A DRINK CONTAINING ALCOHOL: PATIENT DECLINED
HOW MANY STANDARD DRINKS CONTAINING ALCOHOL DO YOU HAVE ON A TYPICAL DAY: PATIENT DECLINED

## 2025-03-04 NOTE — GROUP NOTE
Group Therapy Note    Date: 3/4/2025    Group Start Time: 1000  Group End Time: 1030  Group Topic: Psychotherapy     Bella lGynn MSW        Group Therapy Note    Attendees: 3/12     Patient was offered group therapy today but declined to participate despite encouragement from staff.  1:1 was offered.    Discipline Responsible: /Counselor      Signature:  JOSEPH Alexis

## 2025-03-04 NOTE — GROUP NOTE
Group Therapy Note    Date: 3/4/2025    Group Start Time: 0900  Group End Time: 0915  Group Topic: Group Therapy    Carolynn Carbajal LPN        Group Therapy Note    Attendees: 5/12     patient refused to attend Goals group at 0900 after encouragement from staff.  1:1 talk time provided as alternative to group session    Signature:  CAROLYNN MCCRARY LPN

## 2025-03-04 NOTE — BH NOTE
Patient's blood pressure 163/119 pulse 90.  Patient stated he feels lightheaded as if he were \"drunk\".  Writer assisted patient back to bed.  Internal medicine notified.

## 2025-03-04 NOTE — PROGRESS NOTES
Behavioral Services  Medicare Certification Upon Admission    I certify that this patient's inpatient psychiatric hospital admission is medically necessary for:    [x] (1) Treatment which could reasonably be expected to improve this patient's condition,       [x] (2) Or for diagnostic study;     AND     [x](2) The inpatient psychiatric services are provided while the individual is under the care of a physician and are included in the individualized plan of care.    Estimated length of stay/service 5    Plan for post-hospital care home    Electronically signed by Bruce Monzon MD on 3/4/2025 at 10:16 AM

## 2025-03-04 NOTE — PLAN OF CARE
Problem: Self Harm/Suicidality  Goal: Will have no self-injury during hospital stay  Description: INTERVENTIONS:  1.  Ensure constant observer at bedside with Q15M safety checks  2.  Maintain a safe environment  3.  Secure patient belongings  4.  Ensure family/visitors adhere to safety recommendations  5.  Ensure safety tray has been added to patient's diet order  6.  Every shift and PRN: Re-assess suicidal risk via Frequent Screener    Outcome: Progressing     Problem: Depression  Goal: Will be euthymic at discharge  Description: INTERVENTIONS:  1. Administer medication as ordered  2. Provide emotional support via 1:1 interaction with staff  3. Encourage involvement in milieu/groups/activities  4. Monitor for social isolation  Outcome: Progressing       Patient is alert and oriented. Patient endorses depression and anxiety. Patient endorses suicidal ideation with no plan, but states \"I don't really want to talk about that\". Patient remains free from self harm. Patient is agreeable to seek out staff should feelings worsen or arise. Every 15 minute safety checks maintained.

## 2025-03-04 NOTE — BH NOTE
Contacted podiatry to report previous findings from previous note (see note from writer on 3/4 at 8:58am)

## 2025-03-04 NOTE — PLAN OF CARE
Problem: Self Harm/Suicidality  Goal: Will have no self-injury during hospital stay  Description: INTERVENTIONS:  1.  Ensure constant observer at bedside with Q15M safety checks  2.  Maintain a safe environment  3.  Secure patient belongings  4.  Ensure family/visitors adhere to safety recommendations  5.  Ensure safety tray has been added to patient's diet order  6.  Every shift and PRN: Re-assess suicidal risk via Frequent Screener    3/3/2025 2001 by Deuce Almaraz RN  Outcome: Progressing  Flowsheets (Taken 3/3/2025 1954)  Will have no self-injury during hospital stay:   Ensure constant observer at bedside with Q15M safety checks   Maintain a safe environment   Secure patient belongings    Problem: Depression  Goal: Will be euthymic at discharge  Description: INTERVENTIONS:  1. Administer medication as ordered  2. Provide emotional support via 1:1 interaction with staff  3. Encourage involvement in milieu/groups/activities  4. Monitor for social isolation  Outcome: Progressing    Problem: Risk for Elopement  Goal: Patient will not exit the unit/facility without proper excort  3/3/2025 2001 by Deuce Almaraz, RN  Outcome: Progressing    Patient observed resting in room alert and oriented. Patient will be free from harm to self and others while a patient at hospital. Patient will be free from depression  at time of discharge and has been educated on staying compliant with medication and to keep all scheduled appointments.  Patient will be free from elopement attempts while a patient at hospital. Patient verbalized understanding of education and the importance to stay compliant with medications and scheduled appointments.

## 2025-03-04 NOTE — BH NOTE
Contacted Dr. Figueroa to report patients complaints of increased left foot pain. Patient is reporting that his foot has hurt since his wound care consult on 3/3 stating \"she shoved a qtip up my foot and now I can't put pressure on my foot\". Prior to wound care change, writer gave patient his PRN percocet and scheduled gabapentin. Writer cleaned wound with saline and applied a foam dressing to patient's foot, as order states. Patient is currently reporting 8/10 pain stating it is \"going back up\". No response at this time.

## 2025-03-04 NOTE — PROGRESS NOTES
[facility] Internal Medicine  Lambert Cai MD; Giuseppe Cummings MD, Sánchez Portillo MD, Parisa Haynes MD, Dr Bradford Madrigal MD; Jerome Kaur MD    [facility] Internal Medicine   IN-PATIENT SERVICE   [facility]     Progress Note            Date:   3/4/2025  Patient name:  Asim Back  Date of admission:  3/3/2025  2:14 AM  MRN:   [id number]  Account:  [id number]  YOB: 1970  PCP:    Vinita Agrawal APRN - NP  Room:   [address] [place] [address]  Code Status:    Full Code      Chief Complaint:     Suicidal /Ac Psychosis    History Obtained From:     Patient/EMR/bedside RN     History of Present Illness:     55-year-old male with past medical history as mentioned below.  Admitted for depression with suicidal ideation.    Past Medical History:     Past Medical History:   Diagnosis Date    Hypertension         Past Surgical History:     Past Surgical History:   Procedure Laterality Date    DEBRIDEMENT Left     Left foot        Medications Prior to Admission:     Prior to Admission medications    Medication Sig Start Date End Date Taking? Authorizing Provider   docusate sodium (COLACE) 100 MG capsule Take 1 capsule by mouth 2 times daily 2/5/25   Edith Santo MD   Lactobacillus (ACIDOPHILUS PROBIOTIC PO) Take 1 tablet by mouth daily 2/5/25   Edith Santo MD   lidocaine (XYLOCAINE) 5 % ointment Apply topically 3 times daily as needed for Pain 1/21/25   Edith Santo MD   metoprolol tartrate (LOPRESSOR) 25 MG tablet Take 1 tablet by mouth 2 times daily 2/5/25   Edith Santo MD   oxyCODONE-acetaminophen (PERCOCET)  MG per tablet Take 1 tablet by mouth 3 times daily as needed (pain greater than 7). 2/18/25   Edith Santo MD   risperiDONE (RISPERDAL) 0.25 MG tablet Take 1 tablet by mouth daily 1/6/25   Edith Santo MD   nicotine (NICODERM CQ) 14 MG/24HR Place 1 patch onto the skin daily 12/17/24

## 2025-03-04 NOTE — PROGRESS NOTES
Daily Progress Note  3/4/2025    Patient Name: Asim Back    CHIEF COMPLAINT:  depression, suicidal ideation          SUBJECTIVE:    Patient is seen today for a follow up assessment.  He is found lying in bed awake.  Agrees to conduct interview in private room with door open. He has flattened affect. He endorses depressed mood, sadness, feelings of helplessness and hopelessness. He endorses suicidal ideation. Denies homicidal ideations. He reports negative racing thoughts. He reports poor coping and significant stress after being in a wheelchair for the past 40 years. He  states, \"I don't know what's next, I can't take it anymore\". He reports poor social support since moving from home [facility]. He reports poor sleep and decreased appetite. He endorses pain to left foot, rating intensity 7 out of 10 (with 10 as most severe). He receives daily dressing changes to left foot wound. He is irritable with discussion of pain management. He reports previous use of Percocet  mg. He is currently receiving Percocet 5-325 mg 1 tablet every 4 hours as needed. He is recently increased to Cymbalta 60 mg with benefits of management of neuropathic pain.  He receives gabapentin 800 mg 3 times daily.  He is compliant with taking scheduled psychotropic medications.  Denies side effects.  Nursing denies behavioral disturbances or use of emergency medications.  He is mostly isolative to self and attends select groups. His blood pressure has been elevated this afternoon with noted reports of feeling lightheaded. Recent blood pressure 160/113, heart rate 110. Internal medicine has been notified by nursing.    Appetite:  [] Adequate/Unchanged  [x] Fair [] Decreased      Sleep:       [] Adequate/Unchanged  [x] Fair  [] Poor      Group Attendance on Unit:   [] Yes   [x] Selectively    [] No    Compliant with scheduled medications: [x] Yes  [] No    Received emergency medications in past 24 hrs: [] Yes   [x] No    Medication  the patient.  I reviewed the nurse practitioners documentation above.  Principle diagnosis we are treating for is MDD (major depressive disorder), recurrent severe, without psychosis (HCC). Any additional comments or changes to the nurse practitioners documentation are stated below otherwise agree with assessment.  Plan will be as follows:  Patient was seen this morning in his room.  Patient has been lying down.  Patient tells me he has wound has been painful after the attempt for culture.  Patient reports pain ongoing.  He has been on Percocet 10/325 currently he is getting 5/325 every 4 hrs. patient reports he is okay with this.  Patient continues to be hopeless helpless has a lot of social stressors.  He is unstable and high risk at this point.  Will continue current medications with no change.  Patient has been he has his own apartment and would like to go back to his apartment upon discharge    PLAN  Patient s symptoms   show no change  Attempt to develop insight  Psycho-education conducted.  Supportive Therapy conducted.  Probable discharge is next week  Follow-up daily while on inpatient unit

## 2025-03-04 NOTE — GROUP NOTE
Group Therapy Note    Date: 3/4/2025    Group Start Time: 1330  Group End Time: 1415  Group Topic: Recreational    [facility] Isabelle Edouard CTRS    Recreation Group Note        Date: March 4, 2025 Start Time: 1:30pm  End Time:  215pm       Number of Participants in Group & Unit Census:  3/9    Topic: recreation group     Goal of Group: pt will demonstrate improved leisure awareness and improved interpersonal relationships      Comments:     Patient did not participate in Recreation group, despite staff encouragement and explanation of benefits.  Patient remain seclusive to self.  Q15 minute safety checks maintained for patient safety and will continue to encourage patient to attend unit programming.              Signature:  GARDENIA ROCHE

## 2025-03-04 NOTE — GROUP NOTE
Group Therapy Note    Date: 3/4/2025    Group Start Time: 0720  Group End Time: 0750  Group Topic: Orientation Group    Oksana Peterson LPN        Group Therapy Note    Attendees: 12/12Group Therapy Note     Date: 3/4/25     Group Start Time: 720  Group End Time: 750  Group Topic: Orientation Group     [facility]    LPN           Group Therapy Note     Attendees: 12/12     Patient attended morning orientation group and actively listened while educated on unit policies, expectations, and orientation. Patient was reminded of group times, location of white board, today's staff, shower location/time, group time limitations at nurses station, phone and tv times. Patient educated on discharge process and planning as well as informed that all patient's are seen by a MD or NP every day.            Signature: Oksana Rodgers

## 2025-03-04 NOTE — GROUP NOTE
Group Therapy Note    Date: 3/4/2025    Group Start Time: 1100  Group End Time: 1130  Group Topic: Cognitive Skills    Isabelle Cox CTRS    Cognitive Skills Group Note        Date: March 4, 2025 Start Time: 11am  End Time: 11:30am      Number of Participants in Group & Unit Census:  5/10    Topic: cognitive skills     Goal of Group: pt will demonstrate improved coping skills and improved interpersonal relationships       Comments:     Patient did not participate in Cognitive Skills group, despite staff encouragement and explanation of benefits.  Patient remain seclusive to self.  Q15 minute safety checks maintained for patient safety and will continue to encourage patient to attend unit programming.              Signature:  GARDENIA ROCHE

## 2025-03-05 PROCEDURE — 6370000000 HC RX 637 (ALT 250 FOR IP)

## 2025-03-05 PROCEDURE — 6370000000 HC RX 637 (ALT 250 FOR IP): Performed by: PSYCHIATRY & NEUROLOGY

## 2025-03-05 PROCEDURE — 1240000000 HC EMOTIONAL WELLNESS R&B

## 2025-03-05 PROCEDURE — 99232 SBSQ HOSP IP/OBS MODERATE 35: CPT | Performed by: PSYCHIATRY & NEUROLOGY

## 2025-03-05 PROCEDURE — 99232 SBSQ HOSP IP/OBS MODERATE 35: CPT

## 2025-03-05 RX ORDER — DOXYCYCLINE 100 MG/1
100 CAPSULE ORAL EVERY 12 HOURS SCHEDULED
Status: COMPLETED | OUTPATIENT
Start: 2025-03-05 | End: 2025-03-12

## 2025-03-05 RX ORDER — METOPROLOL TARTRATE 25 MG/1
25 TABLET, FILM COATED ORAL 2 TIMES DAILY
Status: DISCONTINUED | OUTPATIENT
Start: 2025-03-05 | End: 2025-03-14 | Stop reason: HOSPADM

## 2025-03-05 RX ADMIN — METOPROLOL TARTRATE 25 MG: 25 TABLET, FILM COATED ORAL at 14:43

## 2025-03-05 RX ADMIN — GABAPENTIN 800 MG: 400 CAPSULE ORAL at 20:32

## 2025-03-05 RX ADMIN — OXYCODONE HYDROCHLORIDE AND ACETAMINOPHEN 1 TABLET: 5; 325 TABLET ORAL at 20:14

## 2025-03-05 RX ADMIN — GABAPENTIN 800 MG: 400 CAPSULE ORAL at 08:12

## 2025-03-05 RX ADMIN — DOCUSATE SODIUM 100 MG: 100 CAPSULE, LIQUID FILLED ORAL at 08:12

## 2025-03-05 RX ADMIN — OXYCODONE HYDROCHLORIDE AND ACETAMINOPHEN 1 TABLET: 5; 325 TABLET ORAL at 15:57

## 2025-03-05 RX ADMIN — OXYCODONE HYDROCHLORIDE AND ACETAMINOPHEN 1 TABLET: 5; 325 TABLET ORAL at 08:07

## 2025-03-05 RX ADMIN — ASPIRIN 81 MG: 81 TABLET, CHEWABLE ORAL at 08:12

## 2025-03-05 RX ADMIN — OXYCODONE HYDROCHLORIDE AND ACETAMINOPHEN 1 TABLET: 5; 325 TABLET ORAL at 12:00

## 2025-03-05 RX ADMIN — FOLIC ACID 1 MG: 1 TABLET ORAL at 08:13

## 2025-03-05 RX ADMIN — GABAPENTIN 800 MG: 400 CAPSULE ORAL at 14:43

## 2025-03-05 RX ADMIN — ATORVASTATIN CALCIUM 10 MG: 10 TABLET, FILM COATED ORAL at 08:12

## 2025-03-05 RX ADMIN — TRAZODONE HYDROCHLORIDE 50 MG: 50 TABLET ORAL at 20:39

## 2025-03-05 RX ADMIN — DOXYCYCLINE 100 MG: 100 CAPSULE ORAL at 20:32

## 2025-03-05 RX ADMIN — TAMSULOSIN HYDROCHLORIDE 0.4 MG: 0.4 CAPSULE ORAL at 08:12

## 2025-03-05 RX ADMIN — PANTOPRAZOLE SODIUM 40 MG: 40 TABLET, DELAYED RELEASE ORAL at 06:10

## 2025-03-05 RX ADMIN — AMLODIPINE BESYLATE 10 MG: 10 TABLET ORAL at 08:12

## 2025-03-05 RX ADMIN — OXYCODONE HYDROCHLORIDE AND ACETAMINOPHEN 1 TABLET: 5; 325 TABLET ORAL at 03:46

## 2025-03-05 RX ADMIN — METOPROLOL TARTRATE 25 MG: 25 TABLET, FILM COATED ORAL at 20:32

## 2025-03-05 RX ADMIN — DULOXETINE HYDROCHLORIDE 60 MG: 60 CAPSULE, DELAYED RELEASE ORAL at 08:12

## 2025-03-05 ASSESSMENT — PAIN SCALES - WONG BAKER
WONGBAKER_NUMERICALRESPONSE: HURTS A LITTLE BIT
WONGBAKER_NUMERICALRESPONSE: HURTS LITTLE MORE
WONGBAKER_NUMERICALRESPONSE: NO HURT
WONGBAKER_NUMERICALRESPONSE: HURTS A LITTLE BIT

## 2025-03-05 ASSESSMENT — PAIN SCALES - GENERAL
PAINLEVEL_OUTOF10: 8
PAINLEVEL_OUTOF10: 8
PAINLEVEL_OUTOF10: 10
PAINLEVEL_OUTOF10: 8
PAINLEVEL_OUTOF10: 9
PAINLEVEL_OUTOF10: 10
PAINLEVEL_OUTOF10: 7
PAINLEVEL_OUTOF10: 0
PAINLEVEL_OUTOF10: 10
PAINLEVEL_OUTOF10: 0

## 2025-03-05 ASSESSMENT — PAIN DESCRIPTION - LOCATION
LOCATION: FOOT
LOCATION: FOOT
LOCATION: LEG;FOOT
LOCATION: LEG
LOCATION: FOOT;LEG
LOCATION: LEG;FOOT

## 2025-03-05 ASSESSMENT — PAIN DESCRIPTION - DESCRIPTORS
DESCRIPTORS: ACHING;DISCOMFORT;SORE
DESCRIPTORS: ACHING
DESCRIPTORS: THROBBING

## 2025-03-05 ASSESSMENT — PAIN DESCRIPTION - ORIENTATION
ORIENTATION: LEFT

## 2025-03-05 ASSESSMENT — PAIN - FUNCTIONAL ASSESSMENT
PAIN_FUNCTIONAL_ASSESSMENT: PREVENTS OR INTERFERES WITH MANY ACTIVE NOT PASSIVE ACTIVITIES
PAIN_FUNCTIONAL_ASSESSMENT: ACTIVITIES ARE NOT PREVENTED
PAIN_FUNCTIONAL_ASSESSMENT: ACTIVITIES ARE NOT PREVENTED

## 2025-03-05 NOTE — GROUP NOTE
Group Therapy Note    Date: 3/5/2025    Group Start Time: 1100  Group End Time: 1130  Group Topic: Cognitive Skills    Isabelle Cox CTRS    Cognitive Skills Group Note        Date: March 5, 2025 Start Time: 11am  End Time: 11:30am      Number of Participants in Group & Unit Census:  3/10    Topic: cognitive skills    Goal of Group pt will demonstrate improved coping skills and improved interpersonal relationships      Comments:     Patient did not participate in Cognitive Skills group, despite staff encouragement and explanation of benefits.  Patient remain seclusive to self.  Q15 minute safety checks maintained for patient safety and will continue to encourage patient to attend unit programming.              Signature:  GARDENIA ROCHE

## 2025-03-05 NOTE — GROUP NOTE
Group Therapy Note    Date: 3/5/2025    Group Start Time: 0913  Group End Time: 0921  Group Topic: Group Documentation    [facility]    Saundra Layne LPN        Group Therapy Note         Patient did not participate in Goals  group, despite staff encouragement and explanation of benefits.  Patient remain seclusive to self.  Q15 minute safety checks maintained for patient safety and will continue to encourage patient to attend unit programming.        Signature:  Saundra Layne LPN

## 2025-03-05 NOTE — PLAN OF CARE
Problem: Self Harm/Suicidality  Goal: Will have no self-injury during hospital stay  Description: INTERVENTIONS:  1.  Ensure constant observer at bedside with Q15M safety checks  2.  Maintain a safe environment  3.  Secure patient belongings  4.  Ensure family/visitors adhere to safety recommendations  5.  Ensure safety tray has been added to patient's diet order  6.  Every shift and PRN: Re-assess suicidal risk via Frequent Screener    Outcome: Progressing  Note: Patient denies thoughts of self harming or of harming others. No self injurious behaviors noted or reported     Problem: Risk for Elopement  Goal: Patient will not exit the unit/facility without proper excort  Outcome: Progressing  Note: Patient is not a elopement risk. Patient denies thoughts of wanting to leave prior to order of discharge by provider.

## 2025-03-05 NOTE — BH NOTE
[facility]  Day 3 Interdisciplinary Treatment Plan NOTE    Review Date & Time: 3/5/25 1300    Admission Type:   Admission Type: Voluntary    Reason for admission:  Reason for Admission: Patient is a 55-year-old male with a history of bipolar disorder, hypertension, COPD, and quadriplegia who presents to [facility] ED with a chief complaint of suicidal ideation.  He reports that he has had suicidal ideation in the past.  Symptoms began around February 14, 2025, when he was trying to apply for an apartment in [facility] [place] [facility], but found out that \"they had roaches and were selling crack\".  He is currently residing at a senior living center, where he reports that he is the only person his age at the facility.  He reports that he recently lost his only friend at the facility.  He also reports troubles with his girlfriend.  He recently moved here from [place], and is frustrated that he does not have any friends.  He endorses plans for cutting his throat with a knife that he has.  He also reports that he has had a headache, which he attributes to not having his prescribed Percocet.  He also endorses shortness of breath, which he attributes to his current mental state.  He requested to be admitted to the inpatient psychiatric unit \"to get his head straight\".  Estimated Length of Stay: 5-7 days  Estimated Discharge Date Update: to be determined by physician    PATIENT STRENGTHS:  Patient Strengths    Patient Strengths and Limitations:Limitations: Multiple barriers to leisure interests, Inappropriate/potentially harmful leisure interests, External locus of control, General negative or hopeless attitude about future/recovery, Difficult relationships / poor social skills, Perceives need for assistance with self-care  Addictive Behavior:Addictive Behavior  In the Past 3 Months, Have You Felt or Has Someone Told You That You Have a Problem With  : None  Medical Problems:  Past Medical History:   Diagnosis Date

## 2025-03-05 NOTE — GROUP NOTE
Group Therapy Note    Date: 3/5/2025    Group Start Time: 0800  Group End Time: 0815  Group Topic: Orientation Group    Emily Escobedo RN        Group Therapy Note    Attendees: 10/10       Patient attended morning orientation group and actively listened while educated on unit policies, expectations, and orientation. Patient was reminded of group times, location of white board, today's staff, shower location/time, group time limitations at nurses station, phone and tv times. Patient educated on discharge process and planning as well as informed that all patient's are seen by a MD or NP every day.     Signature:  Emily Hughes RN

## 2025-03-05 NOTE — PLAN OF CARE
Problem: Self Harm/Suicidality  Goal: Will have no self-injury during hospital stay  Description: INTERVENTIONS:  1.  Ensure constant observer at bedside with Q15M safety checks  2.  Maintain a safe environment  3.  Secure patient belongings  4.  Ensure family/visitors adhere to safety recommendations  5.  Ensure safety tray has been added to patient's diet order  6.  Every shift and PRN: Re-assess suicidal risk via Frequent Screener    3/4/2025 2216 by Karyn Roe RN  Outcome: Progressing  Note: Patient is cooperative, withdrawn, guarded, flat and endorses anxiety and depression. Denies suicidal ideations. Pt denies thoughts of self harm and is agreeable to seeking out should thoughts of self harm arise. Safe environment maintained. Every 15 minute checks for safety cont per unit policy.  Will cont to monitor for safety and provides support and reassurance as needed.  Pt remains free of falls and verbalizes understanding of individual fall risks.  Pt wearing non skid footwear and encouraged to seek out staff for any assistance needed.       Problem: Depression  Goal: Will be euthymic at discharge  Description: INTERVENTIONS:  1. Administer medication as ordered  2. Provide emotional support via 1:1 interaction with staff  3. Encourage involvement in milieu/groups/activities  4. Monitor for social isolation  3/4/2025 2216 by Karyn Roe, RN  Outcome: Progressing     Problem: Safety - Adult  Goal: Free from fall injury  3/4/2025 2216 by Karyn Roe, RN  Outcome: Progressing

## 2025-03-05 NOTE — PROGRESS NOTES
Kimberly Sim DO   Mometasone Furo-Formoterol Fum (DULERA) 50-5 MCG/ACT AERO Inhale 2 puffs into the lungs daily 12/17/24   Kimberly Sim DO   albuterol sulfate HFA (VENTOLIN HFA) 108 (90 Base) MCG/ACT inhaler Inhale 2 puffs into the lungs 4 times daily as needed for Wheezing 12/17/24   Kimberly Sim DO   traZODone (DESYREL) 50 MG tablet Take 1 tablet by mouth nightly as needed for Sleep 10/14/24   Nacho Rothman MD   atorvastatin (LIPITOR) 10 MG tablet Take 1 tablet by mouth nightly 10/14/24   Nacho Rothman MD   ARIPiprazole (ABILIFY) 5 MG tablet Take 1 tablet by mouth nightly 10/14/24   Nacho Rothman MD   gabapentin (NEURONTIN) 800 MG tablet Take 1 tablet by mouth 3 times daily.    ProviderEdith MD   vitamin D3 (CHOLECALCIFEROL) 25 MCG (1000 UT) TABS tablet Take 1 tablet by mouth daily 9/9/24   Edith Santo MD   DULoxetine (CYMBALTA) 30 MG extended release capsule Take 3 capsules by mouth daily    Edith Santo MD   folic acid (FOLVITE) 400 MCG tablet Take 1 tablet by mouth daily 9/9/24   Edith Santo MD   ibuprofen (ADVIL;MOTRIN) 800 MG tablet Take 1 tablet by mouth 2 times daily as needed for Pain 9/9/24   Edith Santo MD   omeprazole (PRILOSEC) 40 MG delayed release capsule Take 1 capsule by mouth daily 9/9/24   Edith Santo MD   amLODIPine (NORVASC) 10 MG tablet Take 1 tablet by mouth daily    Edith Santo MD   aspirin 81 MG chewable tablet Take 1 tablet by mouth daily    Edith Santo MD   cyclobenzaprine (FLEXERIL) 10 MG tablet Take 1 tablet by mouth nightly as needed for Muscle spasms    Edith Santo MD   tamsulosin (FLOMAX) 0.4 MG capsule Take 1 capsule by mouth daily    Edith Santo MD        Allergies:     Lisinopril and Penicillins    Social History:     Tobacco:    reports that he has been smoking cigarettes. He has never used smokeless tobacco.  Alcohol:

## 2025-03-05 NOTE — PROGRESS NOTES
Daily Progress Note  3/5/2025    Patient Name: Asim Back    CHIEF COMPLAINT:  depression, suicidal ideation          SUBJECTIVE:    Patient is seen today for a follow up assessment.  He is found lying in bed awake.  Agrees to conduct interview in private room with door open. He has flattened affect. He endorses depressed mood, sadness, feelings of helplessness and hopelessness. He reports improvement in suicidal ideation. Denies homicidal ideations. He reports negative racing thoughts.   He receives daily dressing changes to left foot wound.  He reports pain 8 out of 10.  We discussed about his use of Percocet 5-3 25 every 4 hours.  Patient tells me he prefers this to having Percocet 3 times daily when his pain control.  Patient has also been encouraged to take NSAIDs in between to control his pain as far as depression is concerned patient reports he would like to continue with Cymbalta 60 mg and he also has been receiving gabapentin 800 mg 3 times for neuropathic pain. He is compliant with taking scheduled psychotropic medications.  Denies side effects.    Nursing denies behavioral disturbances or use of emergency medications.  He is mostly isolative to self and promises to attend groups tomorrow.      Appetite:  [] Adequate/Unchanged  [x] Fair [] Decreased      Sleep:       [] Adequate/Unchanged  [x] Fair  [] Poor      Group Attendance on Unit:   [] Yes   [] Selectively    [x] No    Compliant with scheduled medications: [x] Yes  [] No    Received emergency medications in past 24 hrs: [] Yes   [x] No    Medication Side Effects: Denies         Mental Status Exam  Level of consciousness: Alert and awake   Appearance: Appropriate attire for setting, resting in bed, with fair  grooming and hygiene   Behavior/Motor: Approachable, engages with interviewer, no psychomotor abnormalities   Attitude toward examiner: Cooperative, attentive, intermittent eye contact  Speech: Low, low volume and monotone  Mood:  2012.         Hemoglobin A1C 03/04/2025 5.5  4.0 - 6.0 % Final    Estimated Avg Glucose 03/04/2025 111  mg/dL Final    Comment: The ADA and AACC recommend providing the estimated average glucose result to permit better   patient understanding of their HBA1c result.           Reviewed patient's current plan of care and vital signs with nursing staff.    Labs reviewed: [x] Yes  Vitals reviewed: [x] Yes      Medications  Current Facility-Administered Medications: vitamin D (ERGOCALCIFEROL) capsule 50,000 Units, 50,000 Units, Oral, Weekly  hydrALAZINE (APRESOLINE) tablet 10 mg, 10 mg, Oral, Q8H PRN  amLODIPine (NORVASC) tablet 10 mg, 10 mg, Oral, Daily  acetaminophen (TYLENOL) tablet 650 mg, 650 mg, Oral, Q4H PRN  aluminum & magnesium hydroxide-simethicone (MAALOX PLUS) 200-200-20 MG/5ML suspension 30 mL, 30 mL, Oral, Q6H PRN  hydrOXYzine HCl (ATARAX) tablet 50 mg, 50 mg, Oral, TID PRN  nicotine polacrilex (COMMIT) lozenge 2 mg, 2 mg, Oral, Q2H PRN  polyethylene glycol (GLYCOLAX) packet 17 g, 17 g, Oral, Daily PRN  traZODone (DESYREL) tablet 50 mg, 50 mg, Oral, Nightly PRN  pantoprazole (PROTONIX) tablet 40 mg, 40 mg, Oral, QAM AC  aspirin chewable tablet 81 mg, 81 mg, Oral, Daily  atorvastatin (LIPITOR) tablet 10 mg, 10 mg, Oral, Daily  docusate sodium (COLACE) capsule 100 mg, 100 mg, Oral, BID PRN  folic acid (FOLVITE) tablet 1 mg, 1 mg, Oral, Daily  tamsulosin (FLOMAX) capsule 0.4 mg, 0.4 mg, Oral, Daily  gabapentin (NEURONTIN) capsule 800 mg, 800 mg, Oral, TID  ibuprofen (ADVIL;MOTRIN) tablet 600 mg, 600 mg, Oral, Q6H PRN  cyclobenzaprine (FLEXERIL) tablet 10 mg, 10 mg, Oral, Nightly PRN  DULoxetine (CYMBALTA) extended release capsule 60 mg, 60 mg, Oral, Daily  oxyCODONE-acetaminophen (PERCOCET) 5-325 MG per tablet 1 tablet, 1 tablet, Oral, Q4H PRN    ASSESSMENT  MDD (major depressive disorder), recurrent severe, without psychosis (HCC)         PATIENT HANDOFF  Patient symptoms are: Unstable,  Will continue current

## 2025-03-05 NOTE — BH NOTE
Pt refused CT scan at this time.Pt educated as to the possible ramifications of denying testing, pt verbalized understanding.

## 2025-03-05 NOTE — PROGRESS NOTES
Physical Therapy        Physical Therapy Cancel Note      DATE: 3/5/2025    NAME: Asim Back  MRN: [id number]   : 1970      Patient not seen this date for Physical Therapy due to:    1444: Patient Declined: due to pain level in left foot. Pt requests attempt again tomorrow      Electronically signed by Juana Swanson PT on 3/5/2025 at 2:55 PM

## 2025-03-05 NOTE — GROUP NOTE
Group Therapy Note    Date: 3/5/2025    Group Start Time: 1430  Group End Time: 1515  Group Topic: Recreational    [facility] Isabel[facility] Edouard [facility]    Recreation Group Note        Date: March 5, 2025 Start Time: 2:30pm  End Time:  315pm      Number of Participants in Group & Unit Census:  2/8    Topic: recreation group     Goal of Group: pt will demonstrate improved coping skills and improved leisure awareness      Comments:     Patient did not participate in Recreation group, despite staff encouragement and explanation of benefits.  Patient remain seclusive to self.  Q15 minute safety checks maintained for patient safety and will continue to encourage patient to attend unit programming.              Signature:  GARDENIA ROCHE

## 2025-03-06 ENCOUNTER — APPOINTMENT (OUTPATIENT)
Dept: CT IMAGING | Age: 55
End: 2025-03-06
Attending: PSYCHIATRY & NEUROLOGY
Payer: MEDICARE

## 2025-03-06 PROCEDURE — 6370000000 HC RX 637 (ALT 250 FOR IP): Performed by: PSYCHIATRY & NEUROLOGY

## 2025-03-06 PROCEDURE — 97530 THERAPEUTIC ACTIVITIES: CPT

## 2025-03-06 PROCEDURE — 99232 SBSQ HOSP IP/OBS MODERATE 35: CPT | Performed by: PSYCHIATRY & NEUROLOGY

## 2025-03-06 PROCEDURE — 99232 SBSQ HOSP IP/OBS MODERATE 35: CPT

## 2025-03-06 PROCEDURE — 6370000000 HC RX 637 (ALT 250 FOR IP)

## 2025-03-06 PROCEDURE — 97110 THERAPEUTIC EXERCISES: CPT

## 2025-03-06 PROCEDURE — 73700 CT LOWER EXTREMITY W/O DYE: CPT

## 2025-03-06 PROCEDURE — 1240000000 HC EMOTIONAL WELLNESS R&B

## 2025-03-06 PROCEDURE — 97162 PT EVAL MOD COMPLEX 30 MIN: CPT

## 2025-03-06 RX ORDER — LIDOCAINE 40 MG/G
CREAM TOPICAL 3 TIMES DAILY PRN
Status: DISCONTINUED | OUTPATIENT
Start: 2025-03-06 | End: 2025-03-14 | Stop reason: HOSPADM

## 2025-03-06 RX ADMIN — OXYCODONE HYDROCHLORIDE AND ACETAMINOPHEN 1 TABLET: 5; 325 TABLET ORAL at 04:28

## 2025-03-06 RX ADMIN — METOPROLOL TARTRATE 25 MG: 25 TABLET, FILM COATED ORAL at 20:24

## 2025-03-06 RX ADMIN — FOLIC ACID 1 MG: 1 TABLET ORAL at 08:13

## 2025-03-06 RX ADMIN — OXYCODONE HYDROCHLORIDE AND ACETAMINOPHEN 1 TABLET: 5; 325 TABLET ORAL at 00:21

## 2025-03-06 RX ADMIN — DOXYCYCLINE 100 MG: 100 CAPSULE ORAL at 20:24

## 2025-03-06 RX ADMIN — GABAPENTIN 800 MG: 400 CAPSULE ORAL at 20:26

## 2025-03-06 RX ADMIN — ATORVASTATIN CALCIUM 10 MG: 10 TABLET, FILM COATED ORAL at 08:12

## 2025-03-06 RX ADMIN — TRAZODONE HYDROCHLORIDE 50 MG: 50 TABLET ORAL at 20:19

## 2025-03-06 RX ADMIN — TAMSULOSIN HYDROCHLORIDE 0.4 MG: 0.4 CAPSULE ORAL at 08:13

## 2025-03-06 RX ADMIN — OXYCODONE HYDROCHLORIDE AND ACETAMINOPHEN 1 TABLET: 5; 325 TABLET ORAL at 20:19

## 2025-03-06 RX ADMIN — IBUPROFEN 600 MG: 600 TABLET, FILM COATED ORAL at 14:13

## 2025-03-06 RX ADMIN — AMLODIPINE BESYLATE 10 MG: 10 TABLET ORAL at 08:13

## 2025-03-06 RX ADMIN — ASPIRIN 81 MG: 81 TABLET, CHEWABLE ORAL at 08:12

## 2025-03-06 RX ADMIN — GABAPENTIN 800 MG: 400 CAPSULE ORAL at 08:12

## 2025-03-06 RX ADMIN — METOPROLOL TARTRATE 25 MG: 25 TABLET, FILM COATED ORAL at 08:12

## 2025-03-06 RX ADMIN — OXYCODONE HYDROCHLORIDE AND ACETAMINOPHEN 1 TABLET: 5; 325 TABLET ORAL at 12:02

## 2025-03-06 RX ADMIN — OXYCODONE HYDROCHLORIDE AND ACETAMINOPHEN 1 TABLET: 5; 325 TABLET ORAL at 08:13

## 2025-03-06 RX ADMIN — PANTOPRAZOLE SODIUM 40 MG: 40 TABLET, DELAYED RELEASE ORAL at 06:12

## 2025-03-06 RX ADMIN — DOCUSATE SODIUM 100 MG: 100 CAPSULE, LIQUID FILLED ORAL at 08:12

## 2025-03-06 RX ADMIN — HYDROXYZINE HYDROCHLORIDE 50 MG: 50 TABLET, FILM COATED ORAL at 20:19

## 2025-03-06 RX ADMIN — GABAPENTIN 800 MG: 400 CAPSULE ORAL at 14:13

## 2025-03-06 RX ADMIN — OXYCODONE HYDROCHLORIDE AND ACETAMINOPHEN 1 TABLET: 5; 325 TABLET ORAL at 16:19

## 2025-03-06 RX ADMIN — DOXYCYCLINE 100 MG: 100 CAPSULE ORAL at 08:12

## 2025-03-06 RX ADMIN — DULOXETINE HYDROCHLORIDE 60 MG: 60 CAPSULE, DELAYED RELEASE ORAL at 08:12

## 2025-03-06 ASSESSMENT — PAIN - FUNCTIONAL ASSESSMENT: PAIN_FUNCTIONAL_ASSESSMENT: ACTIVITIES ARE NOT PREVENTED

## 2025-03-06 ASSESSMENT — PAIN SCALES - GENERAL
PAINLEVEL_OUTOF10: 10
PAINLEVEL_OUTOF10: 5
PAINLEVEL_OUTOF10: 10
PAINLEVEL_OUTOF10: 7
PAINLEVEL_OUTOF10: 9
PAINLEVEL_OUTOF10: 9
PAINLEVEL_OUTOF10: 5
PAINLEVEL_OUTOF10: 10
PAINLEVEL_OUTOF10: 8
PAINLEVEL_OUTOF10: 8

## 2025-03-06 ASSESSMENT — PAIN DESCRIPTION - DESCRIPTORS
DESCRIPTORS: BURNING;ACHING
DESCRIPTORS: ACHING
DESCRIPTORS: ACHING

## 2025-03-06 ASSESSMENT — PAIN DESCRIPTION - LOCATION
LOCATION: HEAD
LOCATION: FOOT
LOCATION: BACK;FOOT
LOCATION: FOOT;LEG
LOCATION: LEG
LOCATION: FOOT
LOCATION: FOOT;LEG

## 2025-03-06 ASSESSMENT — PAIN DESCRIPTION - ORIENTATION
ORIENTATION: LEFT

## 2025-03-06 ASSESSMENT — LIFESTYLE VARIABLES
HOW OFTEN DO YOU HAVE A DRINK CONTAINING ALCOHOL: PATIENT DECLINED
HOW MANY STANDARD DRINKS CONTAINING ALCOHOL DO YOU HAVE ON A TYPICAL DAY: PATIENT DECLINED
HOW OFTEN DO YOU HAVE A DRINK CONTAINING ALCOHOL: PATIENT DECLINED
HOW MANY STANDARD DRINKS CONTAINING ALCOHOL DO YOU HAVE ON A TYPICAL DAY: PATIENT DECLINED

## 2025-03-06 ASSESSMENT — PAIN SCALES - WONG BAKER: WONGBAKER_NUMERICALRESPONSE: HURTS A LITTLE BIT

## 2025-03-06 NOTE — PLAN OF CARE
Problem: Self Harm/Suicidality  Goal: Will have no self-injury during hospital stay  Description: INTERVENTIONS:  1.  Ensure constant observer at bedside with Q15M safety checks  2.  Maintain a safe environment  3.  Secure patient belongings  4.  Ensure family/visitors adhere to safety recommendations  5.  Ensure safety tray has been added to patient's diet order  6.  Every shift and PRN: Re-assess suicidal risk via Frequent Screener    3/6/2025 0156 by Deuce Almaraz RN  [facility]: [facility]  Flowsheets (Taken 3/6/2025 0058)  Will have no self-injury during hospital stay: Ensure constant observer at bedside with Q15M safety checks    Problem: Depression  Goal: Will be euthymic at discharge  Description: INTERVENTIONS:  1. Administer medication as ordered  2. Provide emotional support via 1:1 interaction with staff  3. Encourage involvement in milieu/groups/activities  4. Monitor for social isolation  Outcome: HH/[facility] Progressing     Problem: Safety - Adult  Goal: Free from fall injury  Outcome: [facility]    Patient will be free from falls during stay at hospital. Patient has been educated on how to use call light and to ask for assistance from staff if in need of help. Patient ozzy be free from depression during stay at hospital and after discharge. Patient has been educated on staying compliant with medication regimen and to keep all scheduled follow up appointments. Patient verbalized understanding of education.

## 2025-03-06 NOTE — GROUP NOTE
Group Therapy Note    Date: 3/6/2025    Group Start Time: 1000  Group End Time: 1030  Group Topic: Psychotherapy     Bella Glynn MSW        Group Therapy Note    Attendees: 2/9     Patient was offered group therapy today but declined to participate despite encouragement from staff.  1:1 was offered.    Discipline Responsible: /Counselor      Signature:  JOSEPH Alexis

## 2025-03-06 NOTE — CARE COORDINATION
Patient requested to speak to Social Work.    Patient requested that Social Work get him a new wheelchair. Social Work explained that there's a process to getting new assistive equipment as well as considering if his insurance will cover new equipment.   Patient expressed that his current wheelchair was acquired through his home health agency, [facility].  Social Work stated that he would call them and let them know his concerns about his wheelchair.

## 2025-03-06 NOTE — PROGRESS NOTES
Daily Progress Note  3/6/2025    Patient Name: Asim Back    CHIEF COMPLAINT:  depression, suicidal ideation          SUBJECTIVE:    Patient is seen today for a follow up assessment.  He is found lying in bed awake.  Agrees to conduct interview in private room with door open. He has flattened affect. He endorses depressed mood, sadness, feelings of helplessness and hopelessness. He reports improvement in suicidal ideation. Denies homicidal ideations. He reports negative racing thoughts.   Nursing denies behavioral disturbances or use of emergency medications.  He is mostly isolative to self and promises to attend groups tomorrow.   He tells me lidocaine gel is helpful for his back and he intends to attend groups today     Appetite:  [] Adequate/Unchanged  [x] Fair [] Decreased      Sleep:       [] Adequate/Unchanged  [x] Fair  [] Poor      Group Attendance on Unit:   [] Yes   [] Selectively    [x] No    Compliant with scheduled medications: [x] Yes  [] No    Received emergency medications in past 24 hrs: [] Yes   [x] No    Medication Side Effects: Denies         Mental Status Exam  Level of consciousness: Alert and awake   Appearance: Appropriate attire for setting, resting in bed, with fair  grooming and hygiene   Behavior/Motor: Approachable, engages with interviewer, no psychomotor abnormalities   Attitude toward examiner: Cooperative, attentive, intermittent eye contact  Speech: Low, low volume and monotone  Mood: Depressed  Affect: Mood congruent, flat  Thought processes: linear and coherent   Thought content:  Denies homicidal ideation  Suicidal Ideation: Improvement in  suicidal ideations, contracts for safety on the unit.   Delusions: No evidence of delusions.   Perceptual Disturbance: Denies.  Patient does not appear to be responding to internal stimuli.   Cognition: Oriented to self, location, time, and situation  Memory: intact  Insight: poor   Judgement: poor       Data   height is 1.956 m (6' 5\")  PRN  metoprolol tartrate (LOPRESSOR) tablet 25 mg, 25 mg, Oral, BID  doxycycline monohydrate (MONODOX) capsule 100 mg, 100 mg, Oral, 2 times per day  vitamin D (ERGOCALCIFEROL) capsule 50,000 Units, 50,000 Units, Oral, Weekly  hydrALAZINE (APRESOLINE) tablet 10 mg, 10 mg, Oral, Q8H PRN  amLODIPine (NORVASC) tablet 10 mg, 10 mg, Oral, Daily  acetaminophen (TYLENOL) tablet 650 mg, 650 mg, Oral, Q4H PRN  aluminum & magnesium hydroxide-simethicone (MAALOX PLUS) 200-200-20 MG/5ML suspension 30 mL, 30 mL, Oral, Q6H PRN  hydrOXYzine HCl (ATARAX) tablet 50 mg, 50 mg, Oral, TID PRN  nicotine polacrilex (COMMIT) lozenge 2 mg, 2 mg, Oral, Q2H PRN  polyethylene glycol (GLYCOLAX) packet 17 g, 17 g, Oral, Daily PRN  traZODone (DESYREL) tablet 50 mg, 50 mg, Oral, Nightly PRN  pantoprazole (PROTONIX) tablet 40 mg, 40 mg, Oral, QAM AC  aspirin chewable tablet 81 mg, 81 mg, Oral, Daily  atorvastatin (LIPITOR) tablet 10 mg, 10 mg, Oral, Daily  docusate sodium (COLACE) capsule 100 mg, 100 mg, Oral, BID PRN  folic acid (FOLVITE) tablet 1 mg, 1 mg, Oral, Daily  tamsulosin (FLOMAX) capsule 0.4 mg, 0.4 mg, Oral, Daily  gabapentin (NEURONTIN) capsule 800 mg, 800 mg, Oral, TID  ibuprofen (ADVIL;MOTRIN) tablet 600 mg, 600 mg, Oral, Q6H PRN  cyclobenzaprine (FLEXERIL) tablet 10 mg, 10 mg, Oral, Nightly PRN  DULoxetine (CYMBALTA) extended release capsule 60 mg, 60 mg, Oral, Daily  oxyCODONE-acetaminophen (PERCOCET) 5-325 MG per tablet 1 tablet, 1 tablet, Oral, Q4H PRN    ASSESSMENT  MDD (major depressive disorder), recurrent severe, without psychosis (HCC)         PATIENT HANDOFF  Patient symptoms are: Unstable,  Will continue current medications with no change.  Monitor need and frequency of PRN medications.  Encourage participation in groups and milieu.  Medication changes and discharge as above  Follow-up daily while inpatient.

## 2025-03-06 NOTE — PROGRESS NOTES
[facility] Internal Medicine  Lambert Cai MD; Giuseppe Cummings MD, Sánchez Portillo MD, Parisa Haynes MD, Dr Bradford Madrigal MD; Jerome Kaur MD    [facility] Internal Medicine   IN-PATIENT SERVICE   [facility]     Progress Note            Date:   3/6/2025  Patient name:  Asim Back  Date of admission:  3/3/2025  2:14 AM  MRN:   [id number]  Account:  [id number]  YOB: 1970  PCP:    Vinita Agrawal APRN - NP  Room:   [address] [place] [address]  Code Status:    Full Code      Chief Complaint:     Suicidal /Ac Psychosis    History Obtained From:     Patient/EMR/bedside RN     History of Present Illness:     55-year-old male with past medical history as mentioned below.  Admitted for depression with suicidal ideation.    Past Medical History:     Past Medical History:   Diagnosis Date    Hypertension         Past Surgical History:     Past Surgical History:   Procedure Laterality Date    DEBRIDEMENT Left     Left foot        Medications Prior to Admission:     Prior to Admission medications    Medication Sig Start Date End Date Taking? Authorizing Provider   docusate sodium (COLACE) 100 MG capsule Take 1 capsule by mouth 2 times daily 2/5/25   Edith Santo MD   Lactobacillus (ACIDOPHILUS PROBIOTIC PO) Take 1 tablet by mouth daily 2/5/25   Edith Santo MD   lidocaine (XYLOCAINE) 5 % ointment Apply topically 3 times daily as needed for Pain 1/21/25   Edith Santo MD   metoprolol tartrate (LOPRESSOR) 25 MG tablet Take 1 tablet by mouth 2 times daily 2/5/25   Edith Santo MD   oxyCODONE-acetaminophen (PERCOCET)  MG per tablet Take 1 tablet by mouth 3 times daily as needed (pain greater than 7). 2/18/25   Edith Santo MD   risperiDONE (RISPERDAL) 0.25 MG tablet Take 1 tablet by mouth daily 1/6/25   Edith Santo MD   nicotine (NICODERM CQ) 14 MG/24HR Place 1 patch onto the skin daily 12/17/24

## 2025-03-06 NOTE — GROUP NOTE
Group Therapy Note    Date: 3/6/2025    Group Start Time: 1100  Group End Time: 1140  Group Topic: Cognitive Skills    Isabelle Cox CTRS        Group Therapy Note    Attendees: 5/9       Patient's Goal:  pt will demonstrate improved coping skills and improved interpersonal relationships    Notes:   pt was pleasant but had minimal participation .       Status After Intervention:  Improved    Participation Level: Active Listener     Participation Quality:minimal      Speech:  soft spoken      Thought Process/Content:slow to process       Affective Functioning: Flat      Mood: depressed      Level of consciousness:  Alert      Response to Learning: Able to verbalize current knowledge/experience and Progressing to goal      Endings: None Reported    Modes of Intervention: Education, Support, and Activity      Discipline Responsible: Psychoeducational Specialist      Signature:  GARDENIA ROCHE

## 2025-03-06 NOTE — PLAN OF CARE
Problem: Risk for Elopement  Goal: Patient will not exit the unit/facility without proper excort  Outcome: Progressing  Note:   Patient denies wanting to leave prior to order of discharge from Provider. No elopement behaviors noted or reported per shift.      Problem: Pain  Goal: Verbalizes/displays adequate comfort level or baseline comfort level  Outcome: Progressing  Flowsheets (Taken 3/6/2025 1750)  Verbalizes/displays adequate comfort level or baseline comfort level:   Encourage patient to monitor pain and request assistance   Assess pain using appropriate pain scale   Administer analgesics based on type and severity of pain and evaluate response  Note: Patient reports pain 10/10. RTC PRN intervention for pain administered every 4 hours. Patient encouraged to seek staffing with increased pain.

## 2025-03-06 NOTE — PROGRESS NOTES
Physical Therapy  [facility]   Physical Therapy Evaluation  Date: 3/6/25  Patient Name: Asim Back       Room: 0204/0204-01  MRN: [id number]  Account: [id number]   : 1970  (55 y.o.) Gender: male     Discharge Recommendations:  Discharge Recommendations: Continue to assess pending progress     PT D/C Equipment  Equipment Needed:  (pt reports that he needs new manual wheelchair)  Other: Recommended to nursing that pt be issued a different wheelchair due to current hospital issued chair has a forward tilt to the seat and is wobbly. Nursing offered pt a different chair but wider width, which pt declined  PT Equipment Recommendations  Other: Recommended to nursing that pt be issued a different wheelchair due to current hospital issued chair has a forward tilt to the seat and is wobbly. Nursing offered pt a different chair but wider width, which pt declined     Past Medical History:  has a past medical history of Hypertension.  Past Surgical History:   has a past surgical history that includes Wound debridement (Left).    Subjective  Subjective  Subjective: Pt reports having been wc dependent x 40 years after T9 incomplete spinal cord injury     General  Patient assessed for rehabilitation services?: Yes  Additional Pertinent Hx: Per H and P 3/3/25: Chief Complaint:     Suicidal /Ac Psychosis     History Obtained From:     Patient/EMR/bedside RN      History of Present Illness:     55-year-old male with past medical history as mentioned below.  Admitted for depression with suicidal ideation.     Past Medical History:     Past Medical HistoryExpand by Default  Past Medical History:  Diagnosis Date   Hypertension  Response To Previous Treatment: Not applicable  Family/Caregiver Present: No  Referring Practitioner: LISETH Milner  Referral Date : 25  Diagnosis: Depression with suicidal ideation  Follows Commands: Within Functional Limits  Other (Comment): OK per RN to proceed

## 2025-03-07 PROCEDURE — 6370000000 HC RX 637 (ALT 250 FOR IP)

## 2025-03-07 PROCEDURE — 99222 1ST HOSP IP/OBS MODERATE 55: CPT | Performed by: INTERNAL MEDICINE

## 2025-03-07 PROCEDURE — 6370000000 HC RX 637 (ALT 250 FOR IP): Performed by: PSYCHIATRY & NEUROLOGY

## 2025-03-07 PROCEDURE — G0545 PR INHERENT VISIT TO INPT: HCPCS | Performed by: INTERNAL MEDICINE

## 2025-03-07 PROCEDURE — 1240000000 HC EMOTIONAL WELLNESS R&B

## 2025-03-07 PROCEDURE — 97530 THERAPEUTIC ACTIVITIES: CPT

## 2025-03-07 PROCEDURE — 99232 SBSQ HOSP IP/OBS MODERATE 35: CPT | Performed by: PSYCHIATRY & NEUROLOGY

## 2025-03-07 PROCEDURE — 99232 SBSQ HOSP IP/OBS MODERATE 35: CPT | Performed by: INTERNAL MEDICINE

## 2025-03-07 RX ORDER — RISPERIDONE 0.5 MG/1
0.25 TABLET ORAL 2 TIMES DAILY
Status: DISCONTINUED | OUTPATIENT
Start: 2025-03-07 | End: 2025-03-07

## 2025-03-07 RX ADMIN — DOXYCYCLINE 100 MG: 100 CAPSULE ORAL at 21:09

## 2025-03-07 RX ADMIN — ASPIRIN 81 MG: 81 TABLET, CHEWABLE ORAL at 08:28

## 2025-03-07 RX ADMIN — HYDROXYZINE HYDROCHLORIDE 50 MG: 50 TABLET, FILM COATED ORAL at 21:09

## 2025-03-07 RX ADMIN — METOPROLOL TARTRATE 25 MG: 25 TABLET, FILM COATED ORAL at 08:28

## 2025-03-07 RX ADMIN — GABAPENTIN 800 MG: 400 CAPSULE ORAL at 08:28

## 2025-03-07 RX ADMIN — DOXYCYCLINE 100 MG: 100 CAPSULE ORAL at 08:28

## 2025-03-07 RX ADMIN — OXYCODONE HYDROCHLORIDE AND ACETAMINOPHEN 1 TABLET: 5; 325 TABLET ORAL at 05:49

## 2025-03-07 RX ADMIN — ATORVASTATIN CALCIUM 10 MG: 10 TABLET, FILM COATED ORAL at 08:28

## 2025-03-07 RX ADMIN — PANTOPRAZOLE SODIUM 40 MG: 40 TABLET, DELAYED RELEASE ORAL at 05:49

## 2025-03-07 RX ADMIN — FOLIC ACID 1 MG: 1 TABLET ORAL at 08:28

## 2025-03-07 RX ADMIN — GABAPENTIN 800 MG: 400 CAPSULE ORAL at 21:09

## 2025-03-07 RX ADMIN — OXYCODONE HYDROCHLORIDE AND ACETAMINOPHEN 1 TABLET: 5; 325 TABLET ORAL at 13:53

## 2025-03-07 RX ADMIN — TAMSULOSIN HYDROCHLORIDE 0.4 MG: 0.4 CAPSULE ORAL at 08:28

## 2025-03-07 RX ADMIN — OXYCODONE HYDROCHLORIDE AND ACETAMINOPHEN 1 TABLET: 5; 325 TABLET ORAL at 21:51

## 2025-03-07 RX ADMIN — AMLODIPINE BESYLATE 10 MG: 10 TABLET ORAL at 08:28

## 2025-03-07 RX ADMIN — OXYCODONE HYDROCHLORIDE AND ACETAMINOPHEN 1 TABLET: 5; 325 TABLET ORAL at 09:48

## 2025-03-07 RX ADMIN — OXYCODONE HYDROCHLORIDE AND ACETAMINOPHEN 1 TABLET: 5; 325 TABLET ORAL at 17:54

## 2025-03-07 RX ADMIN — DULOXETINE HYDROCHLORIDE 60 MG: 60 CAPSULE, DELAYED RELEASE ORAL at 08:28

## 2025-03-07 RX ADMIN — GABAPENTIN 800 MG: 400 CAPSULE ORAL at 13:53

## 2025-03-07 RX ADMIN — OXYCODONE HYDROCHLORIDE AND ACETAMINOPHEN 1 TABLET: 5; 325 TABLET ORAL at 00:21

## 2025-03-07 RX ADMIN — TRAZODONE HYDROCHLORIDE 50 MG: 50 TABLET ORAL at 21:09

## 2025-03-07 ASSESSMENT — PAIN SCALES - GENERAL
PAINLEVEL_OUTOF10: 8
PAINLEVEL_OUTOF10: 7
PAINLEVEL_OUTOF10: 8
PAINLEVEL_OUTOF10: 10
PAINLEVEL_OUTOF10: 8
PAINLEVEL_OUTOF10: 8
PAINLEVEL_OUTOF10: 7
PAINLEVEL_OUTOF10: 10

## 2025-03-07 ASSESSMENT — PAIN DESCRIPTION - LOCATION
LOCATION: FOOT
LOCATION: BACK;LEG
LOCATION: BACK;LEG
LOCATION: FOOT
LOCATION: BACK;FOOT
LOCATION: BACK;FOOT
LOCATION: FOOT;HEAD

## 2025-03-07 ASSESSMENT — PAIN DESCRIPTION - ORIENTATION
ORIENTATION: LEFT

## 2025-03-07 ASSESSMENT — PAIN DESCRIPTION - DESCRIPTORS
DESCRIPTORS: SHARP
DESCRIPTORS: SHARP
DESCRIPTORS: ACHING;BURNING
DESCRIPTORS: ACHING

## 2025-03-07 ASSESSMENT — PAIN - FUNCTIONAL ASSESSMENT: PAIN_FUNCTIONAL_ASSESSMENT: ACTIVITIES ARE NOT PREVENTED

## 2025-03-07 NOTE — PROGRESS NOTES
BEHAVIORAL HEALTH FOLLOW-UP NOTE     3/7/2025     Patient was seen and examined in person, Chart reviewed   Patient's case discussed with staff/team    Chief Complaint: Depression with suicidal ideation    Interim History: Patient was seen in the common room.  Patient was sitting at the table.  Patient tells me this is the first time he has been outside at the table.  He was very upset and tearful.  He tells me that he has been told that he might be moved to medicine.  As h he is having osteomyelitis in his leg.  Patient also reports that he has been scheduled for MRI.  But however MRI may not be done today due to metal in his body.  I tried to explain to him why transfer to medicine  may be needed due to the IV antibiotics. Patient seems to understand and was reassured. I also also have explained that he will be seen by psychiatry while in medicine as a consult.  Patient continues to report hopelessness helplessness and suicidal ideation.  Patient repeatedly tells me he does not know how long he can go on like this.  Patient has been taking his medication he is adherent to medication if he continues to be depressed like this plan to add risperidone 0.25 p.o. twice daily.        /75   Pulse 67   Temp 98.7 °F (37.1 °C) (Temporal)   Resp 15   Ht 1.956 m (6' 5\")   Wt 90.7 kg (200 lb)   SpO2 95%   BMI 23.72 kg/m²   Appetite:   [x] Normal/Unchanged  [] Increased  [] Decreased      Sleep:       [] Normal/Unchanged  [x] Fair       [] Poor              Energy:    [] Normal/Unchanged  [] Increased  [x] Decreased        Aggression:  [] yes  [x] no    Patient is [x] able  [] unable to CONTRACT FOR SAFETY ON THE UNIT    PAST MEDICAL/PSYCHIATRIC HISTORY:   Past Medical History:   Diagnosis Date    Hypertension        FAMILY/SOCIAL HISTORY:  No family history on file.  Social History     Socioeconomic History    Marital status: Single     Spouse name: Not on file    Number of children: Not on file    Years of  Medications:     lidocaine (LMX) 4 % cream, , Topical, TID PRN, Bruce Monzon MD    metoprolol tartrate (LOPRESSOR) tablet 25 mg, 25 mg, Oral, BID, Dez Figueroa MD, 25 mg at 03/07/25 0828    doxycycline monohydrate (MONODOX) capsule 100 mg, 100 mg, Oral, 2 times per day, Dez Figueroa MD, 100 mg at 03/07/25 0828    vitamin D (ERGOCALCIFEROL) capsule 50,000 Units, 50,000 Units, Oral, Weekly, Dez Figueroa MD, 50,000 Units at 03/04/25 1220    hydrALAZINE (APRESOLINE) tablet 10 mg, 10 mg, Oral, Q8H PRN, Dez Figueroa MD, 10 mg at 03/04/25 1352    amLODIPine (NORVASC) tablet 10 mg, 10 mg, Oral, Daily, Dez Figueroa MD, 10 mg at 03/07/25 0828    acetaminophen (TYLENOL) tablet 650 mg, 650 mg, Oral, Q4H PRN, Audie Mace MD, 650 mg at 03/04/25 1743    aluminum & magnesium hydroxide-simethicone (MAALOX PLUS) 200-200-20 MG/5ML suspension 30 mL, 30 mL, Oral, Q6H PRN, Audie Mace MD    hydrOXYzine HCl (ATARAX) tablet 50 mg, 50 mg, Oral, TID PRN, Audie Mace MD, 50 mg at 03/06/25 2019    nicotine polacrilex (COMMIT) lozenge 2 mg, 2 mg, Oral, Q2H PRN, Audie Mace MD, 2 mg at 03/04/25 1752    polyethylene glycol (GLYCOLAX) packet 17 g, 17 g, Oral, Daily PRN, Audie Mace MD, 17 g at 03/04/25 1743    traZODone (DESYREL) tablet 50 mg, 50 mg, Oral, Nightly PRN, Audie Mace MD, 50 mg at 03/06/25 2019    pantoprazole (PROTONIX) tablet 40 mg, 40 mg, Oral, QAM AC, Dez Figueroa MD, 40 mg at 03/07/25 0549    aspirin chewable tablet 81 mg, 81 mg, Oral, Daily, Dez Figueroa MD, 81 mg at 03/07/25 0828    atorvastatin (LIPITOR) tablet 10 mg, 10 mg, Oral, Daily, Dez Figueroa MD, 10 mg at 03/07/25 0828    docusate sodium (COLACE) capsule 100 mg, 100 mg, Oral, BID PRN, Dez Figueroa MD, 100 mg at 03/06/25 0812    folic acid (FOLVITE) tablet 1 mg, 1 mg, Oral, Daily, Dez Figueroa MD, 1 mg at 03/07/25 0828    tamsulosin (FLOMAX) capsule 0.4
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
## 2025-03-07 NOTE — GROUP NOTE
Group Therapy Note    Date: 3/7/2025    Group Start Time: 1100  Group End Time: 1115  Group Topic: Hygiene    Oksana Peterson LPN    Patient educated on the importance of personal hygiene.  Patient offered additional hygiene supplies and encouraged the use of showers on the unit with staff assistance if necessary.  Patient reminded patient we may wash and dry their clothes while on the unit    Group Therapy Note    Attendees: 10/12     Signature:  Oksana Rodgers LPN

## 2025-03-07 NOTE — PROGRESS NOTES
Physical Therapy    [facility]   Physical Therapy Treatment  Date: 3/7/25  Patient Name: Asim Back       Room: 0204/0204-01  MRN: [id number]  Account: [id number]   : 1970  (55 y.o.) Gender: male     Discharge Recommendations:  Discharge Recommendations: Continue to assess pending progress     PT D/C Equipment  Equipment Needed:  (pt reports that he needs new manual wheelchair)  Other: Recommended to nursing that pt be issued a different wheelchair due to current hospital issued chair has a forward tilt to the seat and is wobbly. Nursing offered pt a different chair but wider width, which pt declined  PT Equipment Recommendations  Other: Recommended to nursing that pt be issued a different wheelchair due to current hospital issued chair has a forward tilt to the seat and is wobbly. Nursing offered pt a different chair but wider width, which pt declined    General  Patient assessed for rehabilitation services?: Yes  Additional Pertinent Hx: Per H and P 3/3/25: Chief Complaint:     Suicidal /Ac Psychosis     History Obtained From:     Patient/EMR/bedside RN      History of Present Illness:     55-year-old male with past medical history as mentioned below.  Admitted for depression with suicidal ideation.     Past Medical History:     Past Medical HistoryExpand by Default  Past Medical History:  Diagnosis Date   Hypertension  Response To Previous Treatment: Not applicable  Family/Caregiver Present: No  Referring Practitioner: LISETH Milner  Referral Date : 25  Diagnosis: Depression with suicidal ideation  Follows Commands: Within Functional Limits  Other (Comment): OK per RN to proceed with PT evaluation    Past Medical History:  has a past medical history of Hypertension.  Past Surgical History:   has a past surgical history that includes Wound debridement (Left).    Restrictions  Restrictions/Precautions  Restrictions/Precautions: General Precautions, Other (Comment),

## 2025-03-07 NOTE — GROUP NOTE
Group Therapy Note    Date: 3/6/2025    Group Start Time: 2000  Group End Time: 2030  Group Topic: Relaxation    [facility] Zuleima James LPN        Group Therapy Note    Pt was encouraged to attend evening wrap up group with staff encouragement. Pt declined to attend. Pt will continue to be encouraged to attend groups.          Signature:  Zuleima Mckeon LPN
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
## 2025-03-07 NOTE — PLAN OF CARE
Problem: Depression  Goal: Will be euthymic at discharge  Description: INTERVENTIONS:  1. Administer medication as ordered  2. Provide emotional support via 1:1 interaction with staff  3. Encourage involvement in milieu/groups/activities  4. Monitor for social isolation  Outcome: Not Progressing   NOTE: 1:1 with patient for ten minutes.  Patient encouraged to attend unit programming and interact with peers and staff.  Patient also encouraged to tend to hygiene and ADLs.  Patient encouraged to discuss feelings with staff and feedback and reassurance provided.  Patient denies thoughts of self-harm and is agreeable to seeking staff out should thoughts of self-harm arise.  Safe environment maintained.  Every 15-minute checks for safety continues per unit policy.  Will continue to monitor for safety and provide support and reassurance as needed.  Patient is controlled in behaviors. Patient is compliant with medications.

## 2025-03-07 NOTE — GROUP NOTE
Group Therapy Note    Date: 3/7/2025    Group Start Time: 0945  Group End Time: 0956  Group Topic: Nursing    Rubi Oliver, RN    Nursing  Group Note        Date: March 7, 2025 Start Time:  0945   End Time:  0956      Number of Participants in Group & Unit Census:  1/12    Topic: Goals Group    Goal of Group:Identify goal for the day       Comments:     Patient did not participate in  Nursing  group, despite staff encouragement and explanation of benefits.  Patient remain seclusive to self.  Q15 minute safety checks maintained for patient safety and will continue to encourage patient to attend unit programming.
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
## 2025-03-07 NOTE — PROGRESS NOTES
[facility] Internal Medicine  Lambert Cai MD; Giuseppe Cummings MD, Sánchez Portillo MD, Parisa Haynes MD, Dr Bradford Madrigal MD; Jerome Kaur MD    [facility] Internal Medicine   IN-PATIENT SERVICE   [facility]     Progress Note            Date:   3/7/2025  Patient name:  Asim Back  Date of admission:  3/3/2025  2:14 AM  MRN:   [id number]  Account:  [id number]  YOB: 1970  PCP:    Vinita Agrawal APRN - NP  Room:   [address] [place] [address]  Code Status:    Full Code      Chief Complaint:     Suicidal /Ac Psychosis    History Obtained From:     Patient/EMR/bedside RN     History of Present Illness:     55-year-old male with past medical history as mentioned below.  Admitted for depression with suicidal ideation.    Past Medical History:     Past Medical History:   Diagnosis Date    Hypertension         Past Surgical History:     Past Surgical History:   Procedure Laterality Date    DEBRIDEMENT Left     Left foot        Medications Prior to Admission:     Prior to Admission medications    Medication Sig Start Date End Date Taking? Authorizing Provider   docusate sodium (COLACE) 100 MG capsule Take 1 capsule by mouth 2 times daily 2/5/25   Edith Santo MD   Lactobacillus (ACIDOPHILUS PROBIOTIC PO) Take 1 tablet by mouth daily 2/5/25   Edith Santo MD   lidocaine (XYLOCAINE) 5 % ointment Apply topically 3 times daily as needed for Pain 1/21/25   Edith Santo MD   metoprolol tartrate (LOPRESSOR) 25 MG tablet Take 1 tablet by mouth 2 times daily 2/5/25   Edith Santo MD   oxyCODONE-acetaminophen (PERCOCET)  MG per tablet Take 1 tablet by mouth 3 times daily as needed (pain greater than 7). 2/18/25   Edith Santo MD   risperiDONE (RISPERDAL) 0.25 MG tablet Take 1 tablet by mouth daily 1/6/25   Edith Santo MD   nicotine (NICODERM CQ) 14 MG/24HR Place 1 patch onto the skin daily 12/17/24     reports current alcohol use.  Drug Use:  reports no history of drug use.    Family History:     No family history on file.    Review of Systems:     Positive and Negative as described in HPI.    CONSTITUTIONAL:  negative for fevers, chills, sweats, fatigue, weight loss  HEENT:  negative for vision, hearing changes, runny nose, throat pain  RESPIRATORY:  negative for shortness of breath, cough, congestion, wheezing.  CARDIOVASCULAR:  negative for chest pain, palpitations.  GASTROINTESTINAL:  negative for nausea, vomiting, diarrhea, constipation, change in bowel habits, abdominal pain   GENITOURINARY:  negative for difficulty of urination, burning with urination, frequency   INTEGUMENT:  negative for rash, skin lesions, easy bruising   HEMATOLOGIC/LYMPHATIC:  negative for swelling/edema   ALLERGIC/IMMUNOLOGIC:  negative for urticaria , itching  ENDOCRINE:  negative increase in drinking, increase in urination, hot or cold intolerance  MUSCULOSKELETAL:  negative joint pains, muscle aches, swelling of joints  NEUROLOGICAL:  negative for headaches, dizziness, lightheadedness, numbness, pain, tingling extremities      Physical Exam:     /75   Pulse 67   Temp 98.7 °F (37.1 °C) (Temporal)   Resp 15   Ht 1.956 m (6' 5\")   Wt 90.7 kg (200 lb)   SpO2 95%   BMI 23.72 kg/m²   Temp (24hrs), Av.5 °F (36.9 °C), Min:98.2 °F (36.8 °C), Max:98.7 °F (37.1 °C)    No results for input(s): \"POCGLU\" in the last 72 hours.  No intake or output data in the 24 hours ending 25 1245    General Appearance:  alert, well appearing, and in no acute distress  Mental status: oriented to person, place, and time   Head:  normocephalic, atraumatic.  Neck: supple, no carotid bruits, thyroid not palpable  Lungs: Bilateral equal air entry, clear to ausculation, no wheezing, rales or rhonchi, normal effort  Cardiovascular: normal rate, regular rhythm, no murmur, gallop, rub.  Abdomen: Soft, nontender, nondistended, normal bowel sounds,

## 2025-03-07 NOTE — PLAN OF CARE
Problem: Risk for Elopement  Goal: Patient will not exit the unit/facility without proper excort  3/7/2025 0331 by Felicity Hernandez RN  Outcome: Progressing     Problem: Self Harm/Suicidality  Goal: Will have no self-injury during hospital stay  Description: INTERVENTIONS:  1.  Ensure constant observer at bedside with Q15M safety checks  2.  Maintain a safe environment  3.  Secure patient belongings  4.  Ensure family/visitors adhere to safety recommendations  5.  Ensure safety tray has been added to patient's diet order  6.  Every shift and PRN: Re-assess suicidal risk via Frequent Screener    Note: Patient denies suicidal thoughts and is free from self harm. Encouraged to participate in unit programming and seek staff to discuss symptoms.

## 2025-03-07 NOTE — GROUP NOTE
Group Therapy Note    Date: 3/7/2025    Group Start Time: 1000  Group End Time: 1025  Group Topic: Psychotherapy     Bella Glynn MSW        Group Therapy Note    Attendees: 2/12     Patient was offered group therapy today but declined to participate despite encouragement from staff.  1:1 was offered.    Discipline Responsible: /Counselor      Signature:  JOSEPH Alexis

## 2025-03-07 NOTE — PROGRESS NOTES
Physical Therapy          Physical Therapy Cancel Note      DATE: 3/7/2025    NAME: Asim Back  MRN: [id number]   : 1970      1440- Patient not seen this date for Physical Therapy due to:    Patient Declined: due to high levels of pain. Pt was provided with phone number to [employer] to inquire about equipment loan for wheelchair      Electronically signed by Juana Swanson PT on 3/7/2025 at 3:19 PM

## 2025-03-07 NOTE — GROUP NOTE
Group Therapy Note    Date: 3/7/2025    Group Start Time: 1330  Group End Time: 1410  Group Topic: Cognitive Skills    Isabelle Cox CTRS    Cognitive Skills Group Note        Date: March 7, 2025 Start Time: 1:30pm  End Time:  210pm      Number of Participants in Group & Unit Census:  5/12    Topic: cognitive skills     Goal of Group: pt will demonstrate improved coping skills and improved interpersonal relationships       Comments:     Patient did not participate in Cognitive Skills group, despite staff encouragement and explanation of benefits.  Patient remain seclusive to self.  Q15 minute safety checks maintained for patient safety and will continue to encourage patient to attend unit programming.              Signature:  GARDENIA ROCHE

## 2025-03-08 LAB — CRP SERPL HS-MCNC: <3 MG/L (ref 0–5)

## 2025-03-08 PROCEDURE — 1240000000 HC EMOTIONAL WELLNESS R&B

## 2025-03-08 PROCEDURE — 99232 SBSQ HOSP IP/OBS MODERATE 35: CPT | Performed by: PSYCHIATRY & NEUROLOGY

## 2025-03-08 PROCEDURE — 6370000000 HC RX 637 (ALT 250 FOR IP)

## 2025-03-08 PROCEDURE — 6370000000 HC RX 637 (ALT 250 FOR IP): Performed by: PSYCHIATRY & NEUROLOGY

## 2025-03-08 PROCEDURE — 36415 COLL VENOUS BLD VENIPUNCTURE: CPT

## 2025-03-08 PROCEDURE — 86140 C-REACTIVE PROTEIN: CPT

## 2025-03-08 RX ADMIN — OXYCODONE HYDROCHLORIDE AND ACETAMINOPHEN 1 TABLET: 5; 325 TABLET ORAL at 23:35

## 2025-03-08 RX ADMIN — GABAPENTIN 800 MG: 400 CAPSULE ORAL at 14:00

## 2025-03-08 RX ADMIN — DOXYCYCLINE 100 MG: 100 CAPSULE ORAL at 09:02

## 2025-03-08 RX ADMIN — DOXYCYCLINE 100 MG: 100 CAPSULE ORAL at 20:41

## 2025-03-08 RX ADMIN — OXYCODONE HYDROCHLORIDE AND ACETAMINOPHEN 1 TABLET: 5; 325 TABLET ORAL at 15:36

## 2025-03-08 RX ADMIN — ASPIRIN 81 MG: 81 TABLET, CHEWABLE ORAL at 09:02

## 2025-03-08 RX ADMIN — AMLODIPINE BESYLATE 10 MG: 10 TABLET ORAL at 09:03

## 2025-03-08 RX ADMIN — METOPROLOL TARTRATE 25 MG: 25 TABLET, FILM COATED ORAL at 09:03

## 2025-03-08 RX ADMIN — GABAPENTIN 800 MG: 400 CAPSULE ORAL at 09:02

## 2025-03-08 RX ADMIN — OXYCODONE HYDROCHLORIDE AND ACETAMINOPHEN 1 TABLET: 5; 325 TABLET ORAL at 01:56

## 2025-03-08 RX ADMIN — GABAPENTIN 800 MG: 400 CAPSULE ORAL at 20:41

## 2025-03-08 RX ADMIN — PANTOPRAZOLE SODIUM 40 MG: 40 TABLET, DELAYED RELEASE ORAL at 06:27

## 2025-03-08 RX ADMIN — METOPROLOL TARTRATE 25 MG: 25 TABLET, FILM COATED ORAL at 20:42

## 2025-03-08 RX ADMIN — OXYCODONE HYDROCHLORIDE AND ACETAMINOPHEN 1 TABLET: 5; 325 TABLET ORAL at 06:26

## 2025-03-08 RX ADMIN — OXYCODONE HYDROCHLORIDE AND ACETAMINOPHEN 1 TABLET: 5; 325 TABLET ORAL at 19:23

## 2025-03-08 RX ADMIN — ATORVASTATIN CALCIUM 10 MG: 10 TABLET, FILM COATED ORAL at 09:02

## 2025-03-08 RX ADMIN — TRAZODONE HYDROCHLORIDE 50 MG: 50 TABLET ORAL at 20:41

## 2025-03-08 RX ADMIN — DULOXETINE HYDROCHLORIDE 60 MG: 60 CAPSULE, DELAYED RELEASE ORAL at 09:03

## 2025-03-08 RX ADMIN — OXYCODONE HYDROCHLORIDE AND ACETAMINOPHEN 1 TABLET: 5; 325 TABLET ORAL at 11:34

## 2025-03-08 RX ADMIN — TAMSULOSIN HYDROCHLORIDE 0.4 MG: 0.4 CAPSULE ORAL at 09:02

## 2025-03-08 RX ADMIN — FOLIC ACID 1 MG: 1 TABLET ORAL at 09:02

## 2025-03-08 ASSESSMENT — PAIN SCALES - WONG BAKER
WONGBAKER_NUMERICALRESPONSE: HURTS A LITTLE BIT

## 2025-03-08 ASSESSMENT — PAIN DESCRIPTION - DESCRIPTORS
DESCRIPTORS: ACHING

## 2025-03-08 ASSESSMENT — PAIN - FUNCTIONAL ASSESSMENT
PAIN_FUNCTIONAL_ASSESSMENT: ACTIVITIES ARE NOT PREVENTED
PAIN_FUNCTIONAL_ASSESSMENT: ACTIVITIES ARE NOT PREVENTED

## 2025-03-08 ASSESSMENT — PAIN DESCRIPTION - ORIENTATION
ORIENTATION: LEFT
ORIENTATION: RIGHT
ORIENTATION: LEFT
ORIENTATION: RIGHT

## 2025-03-08 ASSESSMENT — PAIN DESCRIPTION - LOCATION
LOCATION: FOOT

## 2025-03-08 ASSESSMENT — PAIN SCALES - GENERAL
PAINLEVEL_OUTOF10: 8
PAINLEVEL_OUTOF10: 10
PAINLEVEL_OUTOF10: 8
PAINLEVEL_OUTOF10: 10
PAINLEVEL_OUTOF10: 10
PAINLEVEL_OUTOF10: 9
PAINLEVEL_OUTOF10: 8
PAINLEVEL_OUTOF10: 9
PAINLEVEL_OUTOF10: 10
PAINLEVEL_OUTOF10: 10

## 2025-03-08 NOTE — PLAN OF CARE
Problem: Self Harm/Suicidality  Goal: Will have no self-injury during hospital stay  Description: INTERVENTIONS:  1.  Ensure constant observer at bedside with Q15M safety checks  2.  Maintain a safe environment  3.  Secure patient belongings  4.  Ensure family/visitors adhere to safety recommendations  5.  Ensure safety tray has been added to patient's diet order  6.  Every shift and PRN: Re-assess suicidal risk via Frequent Screener    Outcome: Progressing     Problem: Risk for Elopement  Goal: Patient will not exit the unit/facility without proper excort  Outcome: Progressing

## 2025-03-08 NOTE — BH NOTE
Dressing change to left foot: Open area cleansed with normal saline then covered with 2x2 gauze topped with 4x4 gauze.     No drainage noted to open area upon dressing change.

## 2025-03-08 NOTE — GROUP NOTE
Group Therapy Note    Date: 3/8/2025    Group Start Time: 1100  Group End Time: 1105  Group Topic: Healthy Living/Wellness    Isabelle Cox CTRS    Health/Wellness Group Note        Date: March 8, 2025 Start Time: 1030 am  End Time:  1105 am      Number of Participants in Group & Unit Census:  3/12    Topic: health/ wellness    Goal of Group: pt will identify benefits of daily movement and improve coping skills       Comments:     Patient did not participate in Health/Wellness group, despite staff encouragement and explanation of benefits.  Patient remain seclusive to self.  Q15 minute safety checks maintained for patient safety and will continue to encourage patient to attend unit programming.              Signature:  GARDENIA ROCHE

## 2025-03-08 NOTE — CONSULTS
remnant midfoot bones.  Findings are suspicious for osteomyelitis. Clinical correlation is recommended.  Interval changes  3/8/2025   No data found.          I have personally reviewed the past medical history, past surgical history, medications, social history, and family history, and I haveupdated the database accordingly.      Allergies:   Lisinopril and Penicillins     Review of Systems:     Review of Systems  As per history of present illness, other than above 12 system review was negative     Media Information    Document Information    Wound Care Image: Wound   Left plantar foot 0.5x0.5   03/03/2025 12:18   Attached To:   Hospital Encounter on 3/3/25   Source Information    Livier Mcnamara, APRN - CNP  [facility]   Document History      Physical Examination :       Physical Exam  Constitutional:       Appearance: He is not ill-appearing.   HENT:      Head: Normocephalic and atraumatic.      Right Ear: External ear normal.      Left Ear: External ear normal.   Eyes:      General: No scleral icterus.     Conjunctiva/sclera: Conjunctivae normal.   Cardiovascular:      Rate and Rhythm: Normal rate and regular rhythm.   Pulmonary:      Effort: Pulmonary effort is normal. No respiratory distress.   Abdominal:      General: There is no distension.   Musculoskeletal:      Cervical back: Neck supple. No rigidity.      Right lower leg: No edema.      Left lower leg: No edema.   Skin:     General: Skin is warm.      Coloration: Skin is not jaundiced.      Comments: Left TMA plantar wound with mild serosanguineous drainage, surrounding calluses, no fluctuation or crepitus, no erythema, no necrotic tissue.   Neurological:      Mental Status: He is alert.      Comments: Paraplegia, contractures         Past Medical History:     Past Medical History:   Diagnosis Date    Hypertension        Past Surgical  History:     Past Surgical History:   Procedure Laterality Date    DEBRIDEMENT Left     Left foot        Medications:      metoprolol tartrate  25 mg Oral BID    doxycycline monohydrate  100 mg Oral 2 times per day    vitamin D  50,000 Units Oral Weekly    amLODIPine  10 mg Oral Daily    pantoprazole  40 mg Oral QAM AC    aspirin  81 mg Oral Daily    atorvastatin  10 mg Oral Daily    folic acid  1 mg Oral Daily    tamsulosin  0.4 mg Oral Daily    gabapentin  800 mg Oral TID    DULoxetine  60 mg Oral Daily       Social History:     Social History     Socioeconomic History    Marital status: Single     Spouse name: Not on file    Number of children: Not on file    Years of education: Not on file    Highest education level: Not on file   Occupational History    Not on file   Tobacco Use    Smoking status: Every Day     Current packs/day: 0.50     Types: Cigarettes    Smokeless tobacco: Never   Substance and Sexual Activity    Alcohol use: Yes    Drug use: No    Sexual activity: Not on file   Other Topics Concern    Not on file   Social History Narrative    Not on file     Social Drivers of Health     Financial Resource Strain: High Risk (11/18/2024)    Received from [employer]    Overall Financial Resource Strain (CARDIA)     Difficulty of Paying Living Expenses: Hard   Food Insecurity: No Food Insecurity (3/3/2025)    Hunger Vital Sign     Worried About Running Out of Food in the Last Year: Never true     Ran Out of Food in the Last Year: Never true   Transportation Needs: No Transportation Needs (3/3/2025)    PRAPARE - Transportation     Lack of Transportation (Medical): No     Lack of Transportation (Non-Medical): No   Physical Activity: Not on file   Stress: Not on file   Social Connections: Not on file   Intimate Partner Violence: Patient Declined (11/16/2024)    Received from [employer]    Humiliation, Afraid, Rape, and Kick questionnaire     Fear of Current or Ex-Partner: Patient declined     Emotionally Abused: Patient declined     Physically Abused: Patient declined     Sexually Abused: Patient
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
## 2025-03-08 NOTE — PLAN OF CARE
Problem: Self Harm/Suicidality  Goal: Will have no self-injury during hospital stay  Description: INTERVENTIONS:  1.  Ensure constant observer at bedside with Q15M safety checks  2.  Maintain a safe environment  3.  Secure patient belongings  4.  Ensure family/visitors adhere to safety recommendations  5.  Ensure safety tray has been added to patient's diet order  6.  Every shift and PRN: Re-assess suicidal risk via Frequent Screener    Outcome: Progressing  Flowsheets (Taken 3/8/2025 1357)  Will have no self-injury during hospital stay: Maintain a safe environment  Note: Patient reports anxiety and depression yet states both are at a manageable level. Patient denies thoughts to harm self and also denies thoughts to harm others.     Problem: Safety - Adult  Goal: Free from fall injury  3/8/2025 1444 by Ivet Connelly RN  Outcome: Progressing  Note: Patient has remained free from falls. Patient effectively transfer to wheelchair independently. Gripper socks on with transfers. Patient verbalizes the understanding of asking for assistance. Q15 minute checks continues for safety.      Problem: Pain  Goal: Verbalizes/displays adequate comfort level or baseline comfort level  Outcome: Progressing  Flowsheets (Taken 3/8/2025 1444)  Verbalizes/displays adequate comfort level or baseline comfort level:   Encourage patient to monitor pain and request assistance   Assess pain using appropriate pain scale   Administer analgesics based on type and severity of pain and evaluate response   Implement non-pharmacological measures as appropriate and evaluate response   Consider cultural and social influences on pain and pain management   Notify Licensed Independent Practitioner if interventions unsuccessful or patient reports new pain  Note: Patient reports foot pain 10/10. Patient is requesting PRN medication every 4 hours for pain. Left foot dressing changes daily.

## 2025-03-08 NOTE — BH NOTE
Patient consistently request PRN percocet q 4 hours. States not being able to wait 5 hours for intervention.

## 2025-03-08 NOTE — PLAN OF CARE
Problem: Self Harm/Suicidality  Goal: Will have no self-injury during hospital stay  Description: INTERVENTIONS:  1.  Ensure constant observer at bedside with Q15M safety checks  2.  Maintain a safe environment  3.  Secure patient belongings  4.  Ensure family/visitors adhere to safety recommendations  5.  Ensure safety tray has been added to patient's diet order  6.  Every shift and PRN: Re-assess suicidal risk via Frequent Screener    Outcome: Progressing  Flowsheets (Taken 3/8/2025 8311)  Will have no self-injury during hospital stay: Maintain a safe environment  Note: Patient reports anxiety and depression yet states both are at a manageable level. Patient denies

## 2025-03-09 PROCEDURE — 6370000000 HC RX 637 (ALT 250 FOR IP): Performed by: PSYCHIATRY & NEUROLOGY

## 2025-03-09 PROCEDURE — 6370000000 HC RX 637 (ALT 250 FOR IP)

## 2025-03-09 PROCEDURE — APPSS30 APP SPLIT SHARED TIME 16-30 MINUTES

## 2025-03-09 PROCEDURE — 1240000000 HC EMOTIONAL WELLNESS R&B

## 2025-03-09 PROCEDURE — 99232 SBSQ HOSP IP/OBS MODERATE 35: CPT | Performed by: PSYCHIATRY & NEUROLOGY

## 2025-03-09 RX ADMIN — GABAPENTIN 800 MG: 400 CAPSULE ORAL at 14:16

## 2025-03-09 RX ADMIN — TAMSULOSIN HYDROCHLORIDE 0.4 MG: 0.4 CAPSULE ORAL at 08:44

## 2025-03-09 RX ADMIN — DOXYCYCLINE 100 MG: 100 CAPSULE ORAL at 21:10

## 2025-03-09 RX ADMIN — OXYCODONE HYDROCHLORIDE AND ACETAMINOPHEN 1 TABLET: 5; 325 TABLET ORAL at 04:58

## 2025-03-09 RX ADMIN — FOLIC ACID 1 MG: 1 TABLET ORAL at 08:44

## 2025-03-09 RX ADMIN — GABAPENTIN 800 MG: 400 CAPSULE ORAL at 21:10

## 2025-03-09 RX ADMIN — DOXYCYCLINE 100 MG: 100 CAPSULE ORAL at 08:45

## 2025-03-09 RX ADMIN — DULOXETINE HYDROCHLORIDE 60 MG: 60 CAPSULE, DELAYED RELEASE ORAL at 08:44

## 2025-03-09 RX ADMIN — OXYCODONE HYDROCHLORIDE AND ACETAMINOPHEN 1 TABLET: 5; 325 TABLET ORAL at 15:45

## 2025-03-09 RX ADMIN — POLYETHYLENE GLYCOL 3350 17 G: 17 POWDER, FOR SOLUTION ORAL at 21:59

## 2025-03-09 RX ADMIN — GABAPENTIN 800 MG: 400 CAPSULE ORAL at 08:44

## 2025-03-09 RX ADMIN — DOCUSATE SODIUM 100 MG: 100 CAPSULE, LIQUID FILLED ORAL at 21:59

## 2025-03-09 RX ADMIN — METOPROLOL TARTRATE 25 MG: 25 TABLET, FILM COATED ORAL at 21:10

## 2025-03-09 RX ADMIN — OXYCODONE HYDROCHLORIDE AND ACETAMINOPHEN 1 TABLET: 5; 325 TABLET ORAL at 21:10

## 2025-03-09 RX ADMIN — DOCUSATE SODIUM 100 MG: 100 CAPSULE, LIQUID FILLED ORAL at 08:51

## 2025-03-09 RX ADMIN — ATORVASTATIN CALCIUM 10 MG: 10 TABLET, FILM COATED ORAL at 08:45

## 2025-03-09 RX ADMIN — PANTOPRAZOLE SODIUM 40 MG: 40 TABLET, DELAYED RELEASE ORAL at 07:08

## 2025-03-09 RX ADMIN — OXYCODONE HYDROCHLORIDE AND ACETAMINOPHEN 1 TABLET: 5; 325 TABLET ORAL at 09:43

## 2025-03-09 RX ADMIN — ASPIRIN 81 MG: 81 TABLET, CHEWABLE ORAL at 08:44

## 2025-03-09 ASSESSMENT — PAIN - FUNCTIONAL ASSESSMENT
PAIN_FUNCTIONAL_ASSESSMENT: ACTIVITIES ARE NOT PREVENTED
PAIN_FUNCTIONAL_ASSESSMENT: PREVENTS OR INTERFERES SOME ACTIVE ACTIVITIES AND ADLS
PAIN_FUNCTIONAL_ASSESSMENT: ACTIVITIES ARE NOT PREVENTED

## 2025-03-09 ASSESSMENT — PAIN DESCRIPTION - LOCATION
LOCATION: LEG
LOCATION: FOOT
LOCATION: LEG
LOCATION: FOOT
LOCATION: FOOT

## 2025-03-09 ASSESSMENT — PAIN SCALES - GENERAL
PAINLEVEL_OUTOF10: 10
PAINLEVEL_OUTOF10: 10
PAINLEVEL_OUTOF10: 3
PAINLEVEL_OUTOF10: 10
PAINLEVEL_OUTOF10: 6
PAINLEVEL_OUTOF10: 7

## 2025-03-09 ASSESSMENT — PAIN DESCRIPTION - ORIENTATION
ORIENTATION: LEFT

## 2025-03-09 ASSESSMENT — PAIN DESCRIPTION - DESCRIPTORS
DESCRIPTORS: ACHING;DISCOMFORT
DESCRIPTORS: ACHING;DISCOMFORT;CRUSHING
DESCRIPTORS: ACHING
DESCRIPTORS: ACHING

## 2025-03-09 NOTE — PLAN OF CARE
Problem: Self Harm/Suicidality  Goal: Will have no self-injury during hospital stay  Description: INTERVENTIONS:  1.  Ensure constant observer at bedside with Q15M safety checks  2.  Maintain a safe environment  3.  Secure patient belongings  4.  Ensure family/visitors adhere to safety recommendations  5.  Ensure safety tray has been added to patient's diet order  6.  Every shift and PRN: Re-assess suicidal risk via Frequent Screener    3/9/2025 1351 by Jimena Rocha LPN  Outcome: Progressing   Patient denies thoughts to harm self  Problem: Depression  Goal: Will be euthymic at discharge  Description: INTERVENTIONS:  1. Administer medication as ordered  2. Provide emotional support via 1:1 interaction with staff  3. Encourage involvement in milieu/groups/activities  4. Monitor for social isolation  3/9/2025 1351 by Jimena Rocha LPN  Outcome: Progressing   Mr. Back is seen in the dayroom, using wheelchair to ambulate, he  comes out for meals and needs, social with select peers and did attend a group.   Problem: Safety - Adult  Goal: Free from fall injury  3/9/2025 1351 by Jimena Rocha LPN  Outcome: Progressing   Patient remains free from falls

## 2025-03-09 NOTE — GROUP NOTE
Group Therapy Note    Date: 3/9/2025    Group Start Time: 1030  Group End Time: 1130  Group Topic: Cognitive Skills    Isabelle Cox CTRS        Group Therapy Note    Attendees: 5/12       Patient's Goal:  pt will demonstrate improved coping skills and improved interpersonal relationships     Notes:   pt was pleasant but only observed activity    Status After Intervention:  Improved    Participation Level: Active Listener and Interactive    Participation Quality: minimal      Speech:  mumbles      Thought Process/Content: logical      Affective Functioning: Flat      Mood: cooperative      Level of consciousness:  Alert      Response to Learning: Able to verbalize current knowledge/experience, Capable of insight, and Progressing to goal      Endings: None Reported    Modes of Intervention: Education, Support, and Activity      Discipline Responsible: Psychoeducational Specialist      Signature:  GARDENIA ROCHE

## 2025-03-09 NOTE — PROGRESS NOTES
Daily Progress Note  3/9/2025    Patient Name: Asim Back    CHIEF COMPLAINT:  Depression with suicidal ideation           SUBJECTIVE:      Patient is seen today for a follow up assessment. Vitals were reviewed and are controlled by current medications.  Per nursing report, patient continues to be isolative and is ready to be discharged but anxious about where he is going.  Patient states that he wants to be \"anywhere but here\", but denies that he is going to the facility that had bedbugs and roaches.  He continues to endorse depression regarding his current living situation, but denies any suicidal ideations, homicidal ideations,or auditory or visual hallucinations. He rates depression as a 7/10 (with 0 equivalent to no depression and 10 being the worst). He continues to lack judgment into his illness and continues to require inpatient hospitalization for his safety and stability.       Appetite:  [x] Normal/Adequate/Unchanged  [] Increased  [] Decreased      Sleep:       [x] Normal/Adequate/Unchanged  [] Fair  [] Poor      Group Attendance on Unit:   [] Yes  [] Selectively    [x] No    Medication Side Effects:  Patient denies any medication side effects at the time of assessment.         Mental Status Exam  Level of consciousness: Alert and awake.   Appearance: Appropriate attire for setting, seated in chair, with fair  grooming and hygiene.   Behavior/Motor: Approachable and cooperative.  Attitude toward examiner: Cooperative, attentive, fair eye contact.  Speech: Normal rate, normal volume, normal tone.  Mood:  Patient reports \"depressed\".   Affect: congruent to mood  Thought processes: Linear and coherent.   Thought content: Denies homicidal ideation.  Suicidal Ideation: Reports improvement in suicidal ideations  Delusions: No evidence of delusions. Denies paranoia.  Perceptual Disturbance: Patient does not appear to be responding to internal stimuli. Denies auditory hallucinations. Denies visual  groups and milieu.  Attempt to develop insight.  Psycho-education conducted.  Supportive Therapy conducted.  Probable discharge is to be determined by MD.   Follow-up daily while inpatient.     Patient continues to be monitored in the inpatient psychiatric facility at [facility] for safety and stabilization. Patient continues to need, on a daily basis, active treatment furnished directly by or requiring the supervision of inpatient psychiatric personnel.    Electronically signed by MICHELLE Obrien CNP on 3/9/2025 at 11:53 AM    **This report has been created using voice recognition software. It may contain minor errors which are inherent in voice recognition technology.**   I independently saw and evaluated the patient.  I reviewed the nurse practitioners documentation above.  Principle diagnosis we are treating for is MDD (major depressive disorder), recurrent severe, without psychosis (HCC). Any additional comments or changes to the nurse practitioners documentation are stated below otherwise agree with assessment.  Plan will be as follows:  Patient tells me he is trying delay his percocerts and he is agreeable to increase the time between dosing to 5hrs. He tells me he is still in pain. He discussed about his family in [place] and made some delusional statements regarding property in [place].    PLAN  Patient s symptoms  remain the same, there is some improvement in his depression and he is getting to be future oriented. Will continue current medications with no change  Attempt to develop insight  Psycho-education conducted.  Supportive Therapy conducted.  Probable discharge is next week  Follow-up daily while on inpatient unit

## 2025-03-09 NOTE — PROGRESS NOTES
BEHAVIORAL HEALTH FOLLOW-UP NOTE     3/8/2025     Patient was seen and examined in person, Chart reviewed   Patient's case discussed with staff/team    Chief Complaint: Depression with suicidal ideation    Interim History: Patient was seen in his room.  Patient is lying down on his bed.  It is evident that patient is getting some cramps.  As being in pain.  Per report patient has been taking Percocets as prescribed every 4 hours.  I advised him to try to push it for 5 hours to reduce the use of Percocets.  Patient tells me that he continues to have pain.  Has not been able to sleep well due to the environment.  Patient continues to report hopelessness helplessness and suicidal ideation.  Patient repeatedly tells me he does not know how long he can go on like this.  Patient has been taking his medication he is adherent to medication       BP (!) 134/95   Pulse 68   Temp 98 °F (36.7 °C) (Temporal)   Resp 16   Ht 1.956 m (6' 5\")   Wt 90.7 kg (200 lb)   SpO2 97%   BMI 23.72 kg/m²   Appetite:   [x] Normal/Unchanged  [] Increased  [] Decreased      Sleep:       [] Normal/Unchanged  [x] Fair       [] Poor              Energy:    [] Normal/Unchanged  [] Increased  [x] Decreased        Aggression:  [] yes  [x] no    Patient is [x] able  [] unable to CONTRACT FOR SAFETY ON THE UNIT    PAST MEDICAL/PSYCHIATRIC HISTORY:   Past Medical History:   Diagnosis Date    Hypertension        FAMILY/SOCIAL HISTORY:  No family history on file.  Social History     Socioeconomic History    Marital status: Single     Spouse name: Not on file    Number of children: Not on file    Years of education: Not on file    Highest education level: Not on file   Occupational History    Not on file   Tobacco Use    Smoking status: Every Day     Current packs/day: 0.50     Types: Cigarettes    Smokeless tobacco: Never   Substance and Sexual Activity    Alcohol use: Yes    Drug use: No    Sexual activity: Not on file   Other Topics Concern    Not  Oral, Nightly PRN, Bruce Monzon MD    DULoxetine (CYMBALTA) extended release capsule 60 mg, 60 mg, Oral, Daily, Bruce Monzon MD, 60 mg at 03/08/25 0903    oxyCODONE-acetaminophen (PERCOCET) 5-325 MG per tablet 1 tablet, 1 tablet, Oral, Q4H PRN, Bruce Monzon MD, 1 tablet at 03/08/25 1923      Examination:  BP (!) 134/95   Pulse 68   Temp 98 °F (36.7 °C) (Temporal)   Resp 16   Ht 1.956 m (6' 5\")   Wt 90.7 kg (200 lb)   SpO2 97%   BMI 23.72 kg/m²   Gait -  wheelchair bound  Medication side effects(SE): none    Mental Status Examination:    Level of consciousness:  within normal limits     Appearance:  Appropriate attire, lying down in the  bed fair grooming   Behavior/Motor: Approachable, engages with interviewer, no psychomotor abnormalities  Attitude toward examiner:  Cooperative, attentive, good eye contact  Speech: Normal rate, volume, and tone.  Mood: Depressed and tearful  Affect: Constricted  Thought processes:  Goal directed, linear  Thought content: Active suicidal ideations, without current plan or intent, contracts for safety on the unit.               Denies homicidal ideations               Denies hallucinations              Denies delusions              Denies paranoia  Cognition:  Oriented to self, location, time, situation  Concentration: Clinically adequate  Memory: Intact  Insight &Judgment: Poor    ASSESSMENT:   Patient symptoms are:  [] Well controlled  [] Improving  [] Worsening  [x] No change      Diagnosis:     Principal Problem:    MDD (major depressive disorder), recurrent severe, without psychosis (HCC)  Active Problems:    Depression with suicidal ideation  Resolved Problems:    * No resolved hospital problems. *      LABS:    No results for input(s): \"WBC\", \"HGB\", \"PLT\" in the last 72 hours.  No results for input(s): \"NA\", \"K\", \"CL\", \"CO2\", \"BUN\", \"CREATININE\", \"GLUCOSE\" in the last 72 hours.  No results for input(s): \"BILITOT\", \"ALKPHOS\", \"AST\", \"ALT\" in the last 72

## 2025-03-09 NOTE — GROUP NOTE
Group Therapy Note    Date: 3/9/2025    Group Start Time: 1500  Group End Time: 1530  Group Topic: Group Documentation    May Oates, RN        Group Therapy Note    Attendees: 12/12 Sunday Safety check completed. All snacks and alike items placed in patient's locker. No contraband found.      The patient is Watcher - Medium risk of patient condition declining or worsening    Shift Goals  Clinical Goals: Infant will remaina stable on BCPAP  Patient Goals: N/A  Family Goals: POB will remain update    Progress made toward(s) clinical / shift goals:  Progressing   Problem: Knowledge Deficit - NICU  Goal: Family/caregivers will demonstrate understanding of plan of care, disease process/condition, diagnostic tests, medications and unit policies and procedures  Outcome: Progressing  Note: POB updated on plan of care at bedside, all questions and concerns addressed.      Problem: Oxygenation / Respiratory Function  Goal: Patient will achieve/maintain optimum respiratory ventilation/gas exchange  Outcome: Progressing  Note: Infant remain stable on BCPAP 5cm  H2O FiO2 21%,  no signs of apnea, bradycardia this shift.      Problem: Hyperbilirubinemia  Goal: Bilirubin elimination will improve  Outcome: Progressing  Note: Infant under phototherapy, eyes mask in place, reposition Q3. Continue to monitor p     Problem: Nutrition / Feeding  Goal: Patient will tolerate transition to enteral feedings  Outcome: Progressing  Note: Infant tolerating gavage feeding of MBM 15mL, abdomen soft, girths stable, no emesis noted this shift.        Patient is not progressing towards the following goals:

## 2025-03-09 NOTE — PLAN OF CARE
Problem: Self Harm/Suicidality  Goal: Will have no self-injury during hospital stay  Description: INTERVENTIONS:  1.  Ensure constant observer at bedside with Q15M safety checks  2.  Maintain a safe environment  3.  Secure patient belongings  4.  Ensure family/visitors adhere to safety recommendations  5.  Ensure safety tray has been added to patient's diet order  6.  Every shift and PRN: Re-assess suicidal risk via Frequent Screener    Outcome: Progressing   Patient denies thoughts of self harm  Problem: Depression  Goal: Will be euthymic at discharge  Description: INTERVENTIONS:  1. Administer medication as ordered  2. Provide emotional support via 1:1 interaction with staff  3. Encourage involvement in milieu/groups/activities  4. Monitor for social isolation  Outcome: Progressing   Mr. Back is seen in the day room, patient uses a wheelchair for mobility, attended a group and is social with select peers. Good appetite, reports some anxiety and depression   Problem: Safety - Adult  Goal: Free from fall injury  Outcome: Progressing   Patient remains free from falls

## 2025-03-10 PROBLEM — L97.521 CHRONIC FOOT ULCER, LEFT, LIMITED TO BREAKDOWN OF SKIN (HCC): Status: ACTIVE | Noted: 2025-03-10

## 2025-03-10 PROCEDURE — 99231 SBSQ HOSP IP/OBS SF/LOW 25: CPT

## 2025-03-10 PROCEDURE — 99232 SBSQ HOSP IP/OBS MODERATE 35: CPT | Performed by: INTERNAL MEDICINE

## 2025-03-10 PROCEDURE — 99232 SBSQ HOSP IP/OBS MODERATE 35: CPT | Performed by: PSYCHIATRY & NEUROLOGY

## 2025-03-10 PROCEDURE — 6370000000 HC RX 637 (ALT 250 FOR IP): Performed by: PSYCHIATRY & NEUROLOGY

## 2025-03-10 PROCEDURE — 1240000000 HC EMOTIONAL WELLNESS R&B

## 2025-03-10 PROCEDURE — 6370000000 HC RX 637 (ALT 250 FOR IP)

## 2025-03-10 PROCEDURE — APPSS30 APP SPLIT SHARED TIME 16-30 MINUTES

## 2025-03-10 PROCEDURE — G0545 PR INHERENT VISIT TO INPT: HCPCS | Performed by: INTERNAL MEDICINE

## 2025-03-10 RX ORDER — ARIPIPRAZOLE 2 MG/1
2 TABLET ORAL DAILY
Status: DISCONTINUED | OUTPATIENT
Start: 2025-03-10 | End: 2025-03-10

## 2025-03-10 RX ORDER — ARIPIPRAZOLE 2 MG/1
2 TABLET ORAL DAILY
Status: DISCONTINUED | OUTPATIENT
Start: 2025-03-11 | End: 2025-03-14 | Stop reason: HOSPADM

## 2025-03-10 RX ADMIN — DULOXETINE HYDROCHLORIDE 60 MG: 60 CAPSULE, DELAYED RELEASE ORAL at 09:18

## 2025-03-10 RX ADMIN — OXYCODONE HYDROCHLORIDE AND ACETAMINOPHEN 1 TABLET: 5; 325 TABLET ORAL at 05:27

## 2025-03-10 RX ADMIN — CYCLOBENZAPRINE 10 MG: 10 TABLET, FILM COATED ORAL at 04:45

## 2025-03-10 RX ADMIN — GABAPENTIN 800 MG: 400 CAPSULE ORAL at 14:30

## 2025-03-10 RX ADMIN — GABAPENTIN 800 MG: 400 CAPSULE ORAL at 09:17

## 2025-03-10 RX ADMIN — OXYCODONE HYDROCHLORIDE AND ACETAMINOPHEN 1 TABLET: 5; 325 TABLET ORAL at 19:21

## 2025-03-10 RX ADMIN — PANTOPRAZOLE SODIUM 40 MG: 40 TABLET, DELAYED RELEASE ORAL at 05:28

## 2025-03-10 RX ADMIN — ASPIRIN 81 MG: 81 TABLET, CHEWABLE ORAL at 09:18

## 2025-03-10 RX ADMIN — FOLIC ACID 1 MG: 1 TABLET ORAL at 09:17

## 2025-03-10 RX ADMIN — AMLODIPINE BESYLATE 10 MG: 10 TABLET ORAL at 09:18

## 2025-03-10 RX ADMIN — TAMSULOSIN HYDROCHLORIDE 0.4 MG: 0.4 CAPSULE ORAL at 09:17

## 2025-03-10 RX ADMIN — OXYCODONE HYDROCHLORIDE AND ACETAMINOPHEN 1 TABLET: 5; 325 TABLET ORAL at 15:06

## 2025-03-10 RX ADMIN — DOXYCYCLINE 100 MG: 100 CAPSULE ORAL at 09:18

## 2025-03-10 RX ADMIN — METOPROLOL TARTRATE 25 MG: 25 TABLET, FILM COATED ORAL at 09:18

## 2025-03-10 RX ADMIN — METOPROLOL TARTRATE 25 MG: 25 TABLET, FILM COATED ORAL at 21:37

## 2025-03-10 RX ADMIN — GABAPENTIN 800 MG: 400 CAPSULE ORAL at 21:37

## 2025-03-10 RX ADMIN — TRAZODONE HYDROCHLORIDE 50 MG: 50 TABLET ORAL at 21:37

## 2025-03-10 RX ADMIN — OXYCODONE HYDROCHLORIDE AND ACETAMINOPHEN 1 TABLET: 5; 325 TABLET ORAL at 09:18

## 2025-03-10 RX ADMIN — DOXYCYCLINE 100 MG: 100 CAPSULE ORAL at 21:37

## 2025-03-10 RX ADMIN — IBUPROFEN 600 MG: 600 TABLET, FILM COATED ORAL at 04:45

## 2025-03-10 RX ADMIN — OXYCODONE HYDROCHLORIDE AND ACETAMINOPHEN 1 TABLET: 5; 325 TABLET ORAL at 01:22

## 2025-03-10 RX ADMIN — ATORVASTATIN CALCIUM 10 MG: 10 TABLET, FILM COATED ORAL at 09:18

## 2025-03-10 ASSESSMENT — PAIN DESCRIPTION - LOCATION
LOCATION: FOOT

## 2025-03-10 ASSESSMENT — PAIN DESCRIPTION - ORIENTATION
ORIENTATION: LEFT

## 2025-03-10 ASSESSMENT — PAIN DESCRIPTION - DESCRIPTORS
DESCRIPTORS: ACHING

## 2025-03-10 ASSESSMENT — PAIN SCALES - GENERAL
PAINLEVEL_OUTOF10: 10
PAINLEVEL_OUTOF10: 9
PAINLEVEL_OUTOF10: 10
PAINLEVEL_OUTOF10: 8
PAINLEVEL_OUTOF10: 10

## 2025-03-10 ASSESSMENT — PAIN SCALES - WONG BAKER: WONGBAKER_NUMERICALRESPONSE: HURTS A LITTLE BIT

## 2025-03-10 NOTE — PLAN OF CARE
Problem: Self Harm/Suicidality  Goal: Will have no self-injury during hospital stay  Description: INTERVENTIONS:  1.  Ensure constant observer at bedside with Q15M safety checks  2.  Maintain a safe environment  3.  Secure patient belongings  4.  Ensure family/visitors adhere to safety recommendations  5.  Ensure safety tray has been added to patient's diet order  6.  Every shift and PRN: Re-assess suicidal risk via Frequent Screener    3/10/2025 1209 by Ivet Connelly, RN  Outcome: Progressing  Note: Patient denies thoughts to self harm or to harm others. Q15 minute checks continue for safety.     Problem: Skin/Tissue Integrity  Goal: Skin integrity remains intact  Description: 1.  Monitor for areas of redness and/or skin breakdown  2.  Assess vascular access sites hourly  3.  Every 4-6 hours minimum:  Change oxygen saturation probe site  4.  Every 4-6 hours:  If on nasal continuous positive airway pressure, respiratory therapy assess nares and determine need for appliance change or resting period  3/10/2025 1209 by Ivet Connelly, RN  Outcome: Progressing  Flowsheets (Taken 3/10/2025 1209)  Skin Integrity Remains Intact: Monitor for areas of redness and/or skin breakdown  Note: Left foot wound care: Dressing changed by wound care. Dressing now clean, dry and intact.

## 2025-03-10 NOTE — PROGRESS NOTES
average glucose result to permit better   patient understanding of their HBA1c result.      CRP 03/08/2025 <3.0  0.0 - 5.0 mg/L Final         Reviewed patient's current plan of care and vital signs with nursing staff.    Labs reviewed: [x] Yes  Last EKG in EMR reviewed: [x] Yes  QTC: 464    Medications  Current Facility-Administered Medications: lidocaine (LMX) 4 % cream, , Topical, TID PRN  metoprolol tartrate (LOPRESSOR) tablet 25 mg, 25 mg, Oral, BID  doxycycline monohydrate (MONODOX) capsule 100 mg, 100 mg, Oral, 2 times per day  vitamin D (ERGOCALCIFEROL) capsule 50,000 Units, 50,000 Units, Oral, Weekly  hydrALAZINE (APRESOLINE) tablet 10 mg, 10 mg, Oral, Q8H PRN  amLODIPine (NORVASC) tablet 10 mg, 10 mg, Oral, Daily  acetaminophen (TYLENOL) tablet 650 mg, 650 mg, Oral, Q4H PRN  aluminum & magnesium hydroxide-simethicone (MAALOX PLUS) 200-200-20 MG/5ML suspension 30 mL, 30 mL, Oral, Q6H PRN  hydrOXYzine HCl (ATARAX) tablet 50 mg, 50 mg, Oral, TID PRN  nicotine polacrilex (COMMIT) lozenge 2 mg, 2 mg, Oral, Q2H PRN  polyethylene glycol (GLYCOLAX) packet 17 g, 17 g, Oral, Daily PRN  traZODone (DESYREL) tablet 50 mg, 50 mg, Oral, Nightly PRN  pantoprazole (PROTONIX) tablet 40 mg, 40 mg, Oral, QAM AC  aspirin chewable tablet 81 mg, 81 mg, Oral, Daily  atorvastatin (LIPITOR) tablet 10 mg, 10 mg, Oral, Daily  docusate sodium (COLACE) capsule 100 mg, 100 mg, Oral, BID PRN  folic acid (FOLVITE) tablet 1 mg, 1 mg, Oral, Daily  tamsulosin (FLOMAX) capsule 0.4 mg, 0.4 mg, Oral, Daily  gabapentin (NEURONTIN) capsule 800 mg, 800 mg, Oral, TID  ibuprofen (ADVIL;MOTRIN) tablet 600 mg, 600 mg, Oral, Q6H PRN  cyclobenzaprine (FLEXERIL) tablet 10 mg, 10 mg, Oral, Nightly PRN  DULoxetine (CYMBALTA) extended release capsule 60 mg, 60 mg, Oral, Daily  oxyCODONE-acetaminophen (PERCOCET) 5-325 MG per tablet 1 tablet, 1 tablet, Oral, Q4H PRN    ASSESSMENT  MDD (major depressive disorder), recurrent severe, without psychosis (HCC)          HANDOFF  Patient symptoms are: Modestly Improving.  Medications are reviewed by attending physician.   Monitor need and frequency of PRN medications.  Encourage participation in groups and milieu.  Probable discharge is to be determined by MD.     Electronically signed by MICHELLE Obrien CNP on 3/10/2025 at 4:01 PM    **This report has been created using voice recognition software. It may contain minor errors which are inherent in voice recognition technology.**   I independently saw and evaluated the patient.  I reviewed the nurse practitioners documentation above.  Principle diagnosis we are treating for is MDD (major depressive disorder), recurrent severe, without psychosis (HCC). Any additional comments or changes to the nurse practitioners documentation are stated below otherwise agree with assessment.  Plan will be as follows:  Patient continues to be depressed hopeless.  And in pain.  Patient has been trying to stretch his Percocet for longer than 4 hours have told him if possible to stretch it to 6 hours and then we can change the order for Percocet every 6 hours but at this time patient does have pain he has been isolative.  He is planning to go back home which is a senior assisted living place.  Has not been taking part in the groups.  Plan to add Abilify 2 mg if patient is agreeable to start from tomorrow.    PLAN  Patient s symptoms   show no change  Attempt to develop insight  Psycho-education conducted.  Supportive Therapy conducted.  Probable discharge is 3-5 days  Follow-up daily while on inpatient unit
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
## 2025-03-10 NOTE — PROGRESS NOTES
[facility] Internal Medicine  Lambert Cai MD; Giuseppe Cummings MD, Sánchez Portillo MD, Parisa Haynes MD, Dr Bradford Madrigal MD; Jerome Kaur MD    [facility] Internal Medicine   IN-PATIENT SERVICE   [facility]     Progress Note            Date:   3/10/2025  Patient name:  Asim Back  Date of admission:  3/3/2025  2:14 AM  MRN:   [id number]  Account:  [id number]  YOB: 1970  PCP:    Vinita Agrawal APRN - NP  Room:   [address] [place] [address]  Code Status:    Full Code      Chief Complaint:     Suicidal /Ac Psychosis    History Obtained From:     Patient/EMR/bedside RN     History of Present Illness:     55-year-old male with past medical history as mentioned below.  Admitted for depression with suicidal ideation.    Past Medical History:     Past Medical History:   Diagnosis Date    Hypertension         Past Surgical History:     Past Surgical History:   Procedure Laterality Date    DEBRIDEMENT Left     Left foot        Medications Prior to Admission:     Prior to Admission medications    Medication Sig Start Date End Date Taking? Authorizing Provider   docusate sodium (COLACE) 100 MG capsule Take 1 capsule by mouth 2 times daily 2/5/25   Edith Santo MD   Lactobacillus (ACIDOPHILUS PROBIOTIC PO) Take 1 tablet by mouth daily 2/5/25   Edith Santo MD   lidocaine (XYLOCAINE) 5 % ointment Apply topically 3 times daily as needed for Pain 1/21/25   Edith Santo MD   metoprolol tartrate (LOPRESSOR) 25 MG tablet Take 1 tablet by mouth 2 times daily 2/5/25   Edith Santo MD   oxyCODONE-acetaminophen (PERCOCET)  MG per tablet Take 1 tablet by mouth 3 times daily as needed (pain greater than 7). 2/18/25   Edith Santo MD   risperiDONE (RISPERDAL) 0.25 MG tablet Take 1 tablet by mouth daily 1/6/25   Edith Santo MD   nicotine (NICODERM CQ) 14 MG/24HR Place 1 patch onto the skin daily 12/17/24    Kimberly Sim DO   Mometasone Furo-Formoterol Fum (DULERA) 50-5 MCG/ACT AERO Inhale 2 puffs into the lungs daily 12/17/24   Kimberly Sim DO   albuterol sulfate HFA (VENTOLIN HFA) 108 (90 Base) MCG/ACT inhaler Inhale 2 puffs into the lungs 4 times daily as needed for Wheezing 12/17/24   Kimberly Sim DO   traZODone (DESYREL) 50 MG tablet Take 1 tablet by mouth nightly as needed for Sleep 10/14/24   Nacho Rothman MD   atorvastatin (LIPITOR) 10 MG tablet Take 1 tablet by mouth nightly 10/14/24   Nacho Rothman MD   ARIPiprazole (ABILIFY) 5 MG tablet Take 1 tablet by mouth nightly 10/14/24   Nacho Rothman MD   gabapentin (NEURONTIN) 800 MG tablet Take 1 tablet by mouth 3 times daily.    ProviderEdith MD   vitamin D3 (CHOLECALCIFEROL) 25 MCG (1000 UT) TABS tablet Take 1 tablet by mouth daily 9/9/24   Edith Santo MD   DULoxetine (CYMBALTA) 30 MG extended release capsule Take 3 capsules by mouth daily    Edith Santo MD   folic acid (FOLVITE) 400 MCG tablet Take 1 tablet by mouth daily 9/9/24   Edith Santo MD   ibuprofen (ADVIL;MOTRIN) 800 MG tablet Take 1 tablet by mouth 2 times daily as needed for Pain 9/9/24   Edith Santo MD   omeprazole (PRILOSEC) 40 MG delayed release capsule Take 1 capsule by mouth daily 9/9/24   Edith Santo MD   amLODIPine (NORVASC) 10 MG tablet Take 1 tablet by mouth daily    Edith Santo MD   aspirin 81 MG chewable tablet Take 1 tablet by mouth daily    Edith Santo MD   cyclobenzaprine (FLEXERIL) 10 MG tablet Take 1 tablet by mouth nightly as needed for Muscle spasms    Edith Santo MD   tamsulosin (FLOMAX) 0.4 MG capsule Take 1 capsule by mouth daily    Edith Santo MD        Allergies:     Lisinopril and Penicillins    Social History:     Tobacco:    reports that he has been smoking cigarettes. He has never used smokeless tobacco.  Alcohol:

## 2025-03-10 NOTE — PLAN OF CARE
Problem: Self Harm/Suicidality  Goal: Will have no self-injury during hospital stay  Description: INTERVENTIONS:  1.  Ensure constant observer at bedside with Q15M safety checks  2.  Maintain a safe environment  3.  Secure patient belongings  4.  Ensure family/visitors adhere to safety recommendations  5.  Ensure safety tray has been added to patient's diet order  6.  Every shift and PRN: Re-assess suicidal risk via Frequent Screener    3/10/2025 0131 by Alisha Cohen RN  Outcome: Progressing  Patient remains safe within a hazard free environment. Patient denies any suicidal ideations at this time. Q 15 minute safety checks maintained.     Problem: Depression  Goal: Will be euthymic at discharge  Description: INTERVENTIONS:  1. Administer medication as ordered  2. Provide emotional support via 1:1 interaction with staff  3. Encourage involvement in milieu/groups/activities  4. Monitor for social isolation  3/10/2025 0131 by Alisha Cohen RN  Outcome: Progressing   Patient endorses depression at manageable levels. He is compliant with his medications and is behaviorally controlled.   Problem: Safety - Adult  Goal: Free from fall injury  3/10/2025 0131 by Alisha Cohen RN  Outcome: Progressing  Patient has been wheelchair bound for over a decade. He is well adjusted with his disability. Patient remains free from any fall injuries at this time.      Problem: Risk for Elopement  Goal: Patient will not exit the unit/facility without proper excort  Outcome: Progressing  Patient has not been observed exhibiting any signs of elopement. He is compliant with his medications and behaviorally compliant.     Problem: Pain  Goal: Verbalizes/displays adequate comfort level or baseline comfort level  Outcome: Progressing     Problem: Skin/Tissue Integrity  Goal: Skin integrity remains intact  Description: 1.  Monitor for areas of redness and/or skin breakdown  2.  Assess vascular access  sites hourly  3.  Every 4-6 hours minimum:  Change oxygen saturation probe site  4.  Every 4-6 hours:  If on nasal continuous positive airway pressure, respiratory therapy assess nares and determine need for appliance change or resting period  Outcome: Progressing  Patient experiences chronic pain in which PRN pain medications are available at the patient's request. Patient has appropriately utilized the pain scale to identify his level of pain.

## 2025-03-10 NOTE — PROGRESS NOTES
Behavioral Services                                              Medicare Re-Certification    I certify that the inpatient psychiatric hospital services furnished since the previous certification/re-certification were, and continue to be, medically necessary for;    [x] (1) Treatment which could reasonably be expected to improve the patient's condition,    [x] (2) Or for diagnostic study.    Estimated length of stay/service 5    Plan for post-hospital care home    This patient continues to need, on a daily basis, active treatment furnished directly by or requiring the supervision of inpatient psychiatric personnel.    Electronically signed by Bruce Monzon MD on 3/9/2025 at 8:56 PM

## 2025-03-10 NOTE — BH NOTE
[facility] [place]  Weekly Interdisciplinary Treatment Plan Note     Review Date & Time: 3/10/2025  1245    Admission Type:   Admission Type: Voluntary    Reason for admission:  Reason for Admission: Patient is a 55-year-old male with a history of bipolar disorder, hypertension, COPD, and quadriplegia who presents to [facility] ED with a chief complaint of suicidal ideation.  He reports that he has had suicidal ideation in the past.  Symptoms began around February 14, 2025, when he was trying to apply for an apartment in [facility], but found out that \"they had roaches and were selling crack\".  He is currently residing at a senior living center, where he reports that he is the only person his age at the facility.  He reports that he recently lost his only friend at the facility.  He also reports troubles with his girlfriend.  He recently moved here from [place], and is frustrated that he does not have any friends.  He endorses plans for cutting his throat with a knife that he has.  He also reports that he has had a headache, which he attributes to not having his prescribed Percocet.  He also endorses shortness of breath, which he attributes to his current mental state.  He requested to be admitted to the inpatient psychiatric unit \"to get his head straight\".    Estimated Length of Stay :  5-7 days  Estimated Discharge Date Update: to be determined by physician    PATIENT STRENGTHS:  Patient Strengths:   Patient Strengths and Limitations:Limitations: Multiple barriers to leisure interests, Inappropriate/potentially harmful leisure interests, External locus of control, General negative or hopeless attitude about future/recovery, Difficult relationships / poor social skills, Perceives need for assistance with self-care  Addictive Behavior:Addictive Behavior  In the Past 3 Months, Have You Felt or Has Someone Told You That You Have a Problem With  : None  Medical Problems:   Past Medical History:   Diagnosis Date

## 2025-03-10 NOTE — PROGRESS NOTES
Wound Ostomy Continence Nursing  Follow Up Visit      NAME:  Asim Back  MEDICAL RECORD NUMBER:  [id number]  AGE: 55 y.o.   GENDER: male  : 1970  TODAY'S DATE:  3/10/2025    Subjective        Follow-up visit today for management of chronic left foot ulcer.  Podiatry service had been consulted, but do not visit [facility] patient[facility].  The patient states that the foot is doing well overall.  There is still some pain, but not worse than previously.  He states that he has not had this open wound for many months.    Review of Systems:  Constitutional: negative for chills and fevers  Respiratory: negative for cough and shortness of breath  Cardiovascular: positive for lower extremity edema, negative for chest pain and palpitations  Gastrointestinal: negative for abdominal pain and nausea  Genitourinary:negative  Integument: Small open area left plantar foot Endocrine: negative  Musculoskeletal: Some difficulty with ambulation due to left TMA amputation  Behavioral/Psych: negative  Pain: Left foot-tolerable      Objective     Vitals:  BP (!) 190/88   Pulse 88   Temp 98 °F (36.7 °C)   Resp 18   Ht 1.956 m (6' 5\")   Wt 90.7 kg (200 lb)   SpO2 98%   BMI 23.72 kg/m²    TEMPERATURE:  Current - Temp: 98 °F (36.7 °C); Max - Temp  Av.4 °F (36.9 °C)  Min: 98 °F (36.7 °C)  Max: 98.7 °F (37.1 °C)    Rock Risk Score Rock Scale Score: 18    INTAKE/OUTPUT:    No intake or output data in the 24 hours ending 03/10/25 1158              Physical Exam:  General Appearance: alert and oriented to person, place and time, well-developed and well-nourished, in no acute distress  Head: normocephalic and atraumatic  Pulmonary/Chest: normal air movement, no respiratory distress  Skin: Small sinus opening in the left plantar foot area that is covered by hyperkeratosis. Does not appear to have signs of infection.   Cardiovascular/Circulation: minor local edema, no cyanosis  Extremities: no cyanosis and no  Depression with suicidal ideation 3/3/2025 Yes    Chronic foot ulcer, left, limited to breakdown of skin (HCC) 3/10/2025 Yes       Left foot stable. Follow up outpatient.      Plan       Cont with dry dressing for now  Follow up [facility]  For concerns, the WO nursing team can be reached via [employer] by searching \"wound ostomy\" under groups and choosing the [facility] option.

## 2025-03-10 NOTE — GROUP NOTE
Group Therapy Note    Date: 3/10/2025    Group Start Time: 1100  Group End Time: 1145  Group Topic: Cognitive Skills    Isabelle Cox CTRS    Cognitive Skills Group Note        Date: March 10, 2025 Start Time: 11am  End Time: 11:45am      Number of Participants in Group & Unit Census:  8/12    Topic: cognitive skills     Goal of Group: pt will demonstrate improved coping skills and improved interpersonal relationships       Comments:     Patient did not participate in Cognitive Skills group, despite staff encouragement and explanation of benefits.  Patient remain seclusive to self.  Q15 minute safety checks maintained for patient safety and will continue to encourage patient to attend unit programming.            Signature:  GARDENIA ROCHE

## 2025-03-10 NOTE — GROUP NOTE
Group Therapy Note    Date: 3/10/2025    Group Start Time: 1000  Group End Time: 1035  Group Topic: Psychotherapy     Bella Glynn MSW        Group Therapy Note    Attendees: 8/12     Patient was offered group therapy today but declined to participate despite encouragement from staff.  1:1 was offered.    Discipline Responsible: /Counselor      Signature:  JOSEPH Alexis

## 2025-03-10 NOTE — GROUP NOTE
Group Therapy Note    Date: 3/10/2025    Group Start Time: 1430  Group End Time: 1510  Group Topic: Recreational    [facility] Isabelle Edouard CTRS    Recreation Group Note        Date: March 10, 2025 Start Time: 2:30pm  End Time:  310 pm       Number of Participants in Group & Unit Census:  6/11    Topic: recreation group     Goal of Group: pt will demonstrate improved coping skills and improved interpersonal relationships       Comments:     Patient did not participate in Recreation group, despite staff encouragement and explanation of benefits.  Patient remain seclusive to self.  Q15 minute safety checks maintained for patient safety and will continue to encourage patient to attend unit programming.                Signature:  GARDENIA ROCHE

## 2025-03-11 PROCEDURE — 99232 SBSQ HOSP IP/OBS MODERATE 35: CPT | Performed by: PSYCHIATRY & NEUROLOGY

## 2025-03-11 PROCEDURE — 6370000000 HC RX 637 (ALT 250 FOR IP): Performed by: PSYCHIATRY & NEUROLOGY

## 2025-03-11 PROCEDURE — 99232 SBSQ HOSP IP/OBS MODERATE 35: CPT | Performed by: INTERNAL MEDICINE

## 2025-03-11 PROCEDURE — 6370000000 HC RX 637 (ALT 250 FOR IP)

## 2025-03-11 PROCEDURE — 1240000000 HC EMOTIONAL WELLNESS R&B

## 2025-03-11 RX ADMIN — METOPROLOL TARTRATE 25 MG: 25 TABLET, FILM COATED ORAL at 20:51

## 2025-03-11 RX ADMIN — DOXYCYCLINE 100 MG: 100 CAPSULE ORAL at 08:16

## 2025-03-11 RX ADMIN — FOLIC ACID 1 MG: 1 TABLET ORAL at 08:15

## 2025-03-11 RX ADMIN — ATORVASTATIN CALCIUM 10 MG: 10 TABLET, FILM COATED ORAL at 08:15

## 2025-03-11 RX ADMIN — ASPIRIN 81 MG: 81 TABLET, CHEWABLE ORAL at 08:15

## 2025-03-11 RX ADMIN — PANTOPRAZOLE SODIUM 40 MG: 40 TABLET, DELAYED RELEASE ORAL at 06:55

## 2025-03-11 RX ADMIN — DULOXETINE HYDROCHLORIDE 60 MG: 60 CAPSULE, DELAYED RELEASE ORAL at 08:15

## 2025-03-11 RX ADMIN — METOPROLOL TARTRATE 25 MG: 25 TABLET, FILM COATED ORAL at 08:15

## 2025-03-11 RX ADMIN — HYDROXYZINE HYDROCHLORIDE 50 MG: 50 TABLET, FILM COATED ORAL at 12:09

## 2025-03-11 RX ADMIN — TRAZODONE HYDROCHLORIDE 50 MG: 50 TABLET ORAL at 20:52

## 2025-03-11 RX ADMIN — GABAPENTIN 800 MG: 400 CAPSULE ORAL at 15:29

## 2025-03-11 RX ADMIN — DOXYCYCLINE 100 MG: 100 CAPSULE ORAL at 20:51

## 2025-03-11 RX ADMIN — GABAPENTIN 800 MG: 400 CAPSULE ORAL at 20:51

## 2025-03-11 RX ADMIN — OXYCODONE HYDROCHLORIDE AND ACETAMINOPHEN 1 TABLET: 5; 325 TABLET ORAL at 10:04

## 2025-03-11 RX ADMIN — OXYCODONE HYDROCHLORIDE AND ACETAMINOPHEN 1 TABLET: 5; 325 TABLET ORAL at 19:30

## 2025-03-11 RX ADMIN — OXYCODONE HYDROCHLORIDE AND ACETAMINOPHEN 1 TABLET: 5; 325 TABLET ORAL at 07:05

## 2025-03-11 RX ADMIN — OXYCODONE HYDROCHLORIDE AND ACETAMINOPHEN 1 TABLET: 5; 325 TABLET ORAL at 02:20

## 2025-03-11 RX ADMIN — ERGOCALCIFEROL 50000 UNITS: 1.25 CAPSULE ORAL at 08:15

## 2025-03-11 RX ADMIN — OXYCODONE HYDROCHLORIDE AND ACETAMINOPHEN 1 TABLET: 5; 325 TABLET ORAL at 15:29

## 2025-03-11 RX ADMIN — AMLODIPINE BESYLATE 10 MG: 10 TABLET ORAL at 08:15

## 2025-03-11 RX ADMIN — GABAPENTIN 800 MG: 400 CAPSULE ORAL at 08:15

## 2025-03-11 RX ADMIN — TAMSULOSIN HYDROCHLORIDE 0.4 MG: 0.4 CAPSULE ORAL at 08:15

## 2025-03-11 RX ADMIN — CYCLOBENZAPRINE 10 MG: 10 TABLET, FILM COATED ORAL at 19:30

## 2025-03-11 ASSESSMENT — PAIN - FUNCTIONAL ASSESSMENT
PAIN_FUNCTIONAL_ASSESSMENT: ACTIVITIES ARE NOT PREVENTED
PAIN_FUNCTIONAL_ASSESSMENT: 0-10
PAIN_FUNCTIONAL_ASSESSMENT: ACTIVITIES ARE NOT PREVENTED
PAIN_FUNCTIONAL_ASSESSMENT: 0-10
PAIN_FUNCTIONAL_ASSESSMENT: ACTIVITIES ARE NOT PREVENTED

## 2025-03-11 ASSESSMENT — PAIN DESCRIPTION - DESCRIPTORS
DESCRIPTORS: ACHING

## 2025-03-11 ASSESSMENT — PAIN DESCRIPTION - LOCATION
LOCATION: BACK;FOOT
LOCATION: OTHER (COMMENT)
LOCATION: FOOT
LOCATION: FOOT

## 2025-03-11 ASSESSMENT — PAIN SCALES - WONG BAKER
WONGBAKER_NUMERICALRESPONSE: HURTS A LITTLE BIT
WONGBAKER_NUMERICALRESPONSE: HURTS A LITTLE BIT

## 2025-03-11 ASSESSMENT — PAIN DESCRIPTION - ORIENTATION
ORIENTATION: LEFT

## 2025-03-11 ASSESSMENT — PAIN SCALES - GENERAL
PAINLEVEL_OUTOF10: 3
PAINLEVEL_OUTOF10: 10
PAINLEVEL_OUTOF10: 4
PAINLEVEL_OUTOF10: 0
PAINLEVEL_OUTOF10: 10
PAINLEVEL_OUTOF10: 10
PAINLEVEL_OUTOF10: 8

## 2025-03-11 NOTE — PROGRESS NOTES
[facility] Internal Medicine  Lambert Cai MD; Giuseppe Cummings MD, Sánchez Portillo MD, Parisa Haynes MD, Dr Bradford Madrigal MD; Jerome Kaur MD    [facility] Internal Medicine   IN-PATIENT SERVICE   [facility]     Progress Note            Date:   3/11/2025  Patient name:  Asim Back  Date of admission:  3/3/2025  2:14 AM  MRN:   [id number]  Account:  [id number]  YOB: 1970  PCP:    Vinita Agrawal APRN - NP  Room:   [address] [place] [address]  Code Status:    Full Code      Chief Complaint:     Suicidal /Ac Psychosis    History Obtained From:     Patient/EMR/bedside RN     History of Present Illness:     55-year-old male with past medical history as mentioned below.  Admitted for depression with suicidal ideation.    Past Medical History:     Past Medical History:   Diagnosis Date    Hypertension         Past Surgical History:     Past Surgical History:   Procedure Laterality Date    DEBRIDEMENT Left     Left foot        Medications Prior to Admission:     Prior to Admission medications    Medication Sig Start Date End Date Taking? Authorizing Provider   docusate sodium (COLACE) 100 MG capsule Take 1 capsule by mouth 2 times daily 2/5/25   Edith Sanot MD   Lactobacillus (ACIDOPHILUS PROBIOTIC PO) Take 1 tablet by mouth daily 2/5/25   Edith Santo MD   lidocaine (XYLOCAINE) 5 % ointment Apply topically 3 times daily as needed for Pain 1/21/25   Edith Santo MD   metoprolol tartrate (LOPRESSOR) 25 MG tablet Take 1 tablet by mouth 2 times daily 2/5/25   Edith Santo MD   oxyCODONE-acetaminophen (PERCOCET)  MG per tablet Take 1 tablet by mouth 3 times daily as needed (pain greater than 7). 2/18/25   Edith Santo MD   risperiDONE (RISPERDAL) 0.25 MG tablet Take 1 tablet by mouth daily 1/6/25   Edith Santo MD   nicotine (NICODERM CQ) 14 MG/24HR Place 1 patch onto the skin daily 12/17/24    seen and examined at bedside.  Podiatry has yet to evaluate the patient.  Foul-smelling discharge, wound care.  Continue doxycycline.  CT scan reviewed.  Chronic osteomyelitis, cellulitis, acute component could not be excluded.  Will consult ID.  Continue with the antibiotic for now.  Monitor for any fevers.  Labs medication and vitals reviewed.  Id and podiatry input pending    March 11  ID input noted  Oral doxy for chronic supression  Out pt with dr burns  Pt refusing surg       Consultations:   IP CONSULT TO INTERNAL MEDICINE  IP CONSULT TO PODIATRY  IP CONSULT TO INFECTIOUS DISEASES  IP CONSULT TO ASHIA Cai MD  3/11/2025  2:11 PM    Copy sent to Dr. Agrawal, Vinita Johnston, APRN - NP    Please note that this chart was generated using voice recognition Dragon dictation software.  Although every effort was made to ensure the accuracy of this automated transcription, some errors in transcription may have occurred.

## 2025-03-11 NOTE — GROUP NOTE
Group Therapy Note    Date: 3/11/2025    Group Start Time: 1000  Group End Time: 1040  Group Topic: Psychotherapy     Bella Glynn MSW        Group Therapy Note    Attendees: 8/11     Patient was offered group therapy today but declined to participate despite encouragement from staff.  1:1 was offered.    Discipline Responsible: /Counselor      Signature:  JOSEPH Alexis

## 2025-03-11 NOTE — BH NOTE
At this time patient refused his scheduled Abilify 2mg. Writer offered education on benefits and uses but patient continues to refuse.

## 2025-03-11 NOTE — BH NOTE
At this time writer offers to change patients dressing,offer education on benefits and infection risk. Patient refuses stating \" For what, it dont need changed.\"

## 2025-03-11 NOTE — PLAN OF CARE
Problem: Self Harm/Suicidality  Goal: Will have no self-injury during hospital stay  Description: INTERVENTIONS:  1.  Ensure constant observer at bedside with Q15M safety checks  2.  Maintain a safe environment  3.  Secure patient belongings  4.  Ensure family/visitors adhere to safety recommendations  5.  Ensure safety tray has been added to patient's diet order  6.  Every shift and PRN: Re-assess suicidal risk via Frequent Screener    3/11/2025 0130 by Alisha Cohen RN  Outcome: Progressing  Patient not endorsing thoughts to harm self. He remains safe within a hazard free environment. Q 15 minute safety checks maintained.      Problem: Depression  Goal: Will be euthymic at discharge  Description: INTERVENTIONS:  1. Administer medication as ordered  2. Provide emotional support via 1:1 interaction with staff  3. Encourage involvement in milieu/groups/activities  4. Monitor for social isolation  Outcome: Progressing   Patient endorsing depression. He has been isolative to self and room this shift. Patient stated that he was trying to stop having negative thoughts regarding the possibility of having a below vs. above knee amputation. He asked to be provided additional space and time.  Problem: Safety - Adult  Goal: Free from fall injury  Outcome: Progressing   Patient paralyzed from the waist down independently transfers safely. He is appears to be aware of his physical limitations and works well within them. He remains free from fall injury at this time.  Problem: Pain  Goal: Verbalizes/displays adequate comfort level or baseline comfort level  Outcome: Progressing   Patient able to appropriately use the pain scale. PRN pain medications available  upon request.   Problem: Skin/Tissue Integrity  Goal: Skin integrity remains intact  Description: 1.  Monitor for areas of redness and/or skin breakdown  2.  Assess vascular access sites hourly  3.  Every 4-6 hours minimum:  Change oxygen saturation probe

## 2025-03-11 NOTE — GROUP NOTE
Group Therapy Note    Date: 3/11/2025    Group Start Time: 0930  Group End Time: 0948  Group Topic: Group Therapy    Carolynn Carbajal LPN        Group Therapy Note    Attendees: 4/12     patient refused to attend Goals group at 0930 after encouragement from staff.  1:1 talk time provided as alternative to group session    Signature:  CAROLYNN MCCRARY LPN

## 2025-03-11 NOTE — PLAN OF CARE
satnam and determine need for appliance change or resting period  3/11/2025 1400 by Cora Najera LPN  Outcome: Progressing  3/11/2025 0130 by Alisha Cohen RN  Outcome: Progressing     Patient reports depression and anxiety but denies suicidal ideation. Patient is encouraged to notify staff if thoughts or mood changes occur throughout shift. Patient reports pain rating it an 4/10 as needed medication were given , writer will report or document any changes in pain  scale. Writer notes no new skin integrity, but when offering to change patients dressing and clean wound patient becomes irritable and refuses. Patient is noted to come out of room and eating 100% of his trays  and reports adequate amount of sleep.  Patient is an high fall risk due to leg amputee , patient uses a wheelchair for safety and is free of falls at this time. Patient shows no signs of being an risk of elopement staff will continue to monitor throughout shift.  Patient is irritable, labile, blunt affect, isolative to room, selectively not compliant and behavior controled. 15 minute rounding continues throughout shift for patient safety while on unit.

## 2025-03-11 NOTE — GROUP NOTE
Group Therapy Note    Date: 3/10/2025    Group Start Time: 2000  Group End Time: 2030  Group Topic: Relaxation    [facility] Step[facility]    Barbi Nolan, RN        Group Therapy Note    Attendees: 6/11       Patient refused to attend group at this time.   Signature:  Barbi Nolan RN
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
## 2025-03-11 NOTE — GROUP NOTE
Group Therapy Note    Date: 3/11/2025    Group Start Time: 1100  Group End Time: 1120  Group Topic: Focus Group    Isabelle Cox CTRS    Focus  Group Note        Date: March 11, 2025 Start Time: 11am  End Time:  1120 am      Number of Participants in Group & Unit Census:  6/12    Topic: focus group, pt advisory committee    Goal of Group: pt will identify positives and areas of growth for [facility]      Comments:     Patient did not participate in  focus  group, despite staff encouragement and explanation of benefits.  Patient remain seclusive to self.  Q15 minute safety checks maintained for patient safety and will continue to encourage patient to attend unit programming.              Signature:  GARDENIA ROCHE

## 2025-03-11 NOTE — PROGRESS NOTES
Daily Progress Note  3/11/2025    Patient Name: Asim Back    CHIEF COMPLAINT:   Depression with suicidal ideation           SUBJECTIVE:      Patient is seen today for a follow up assessment. Vitals are wnl. Patient was seen in the common room getting ready for lunch. He looked depressed and sad. He was seen earlier by the ID and and was recommended below-knee amputation.  Patient tells me that he will be refusing surgery.  He tells me that he cannot go through another surgery.  He has been started on oral antibiotics and he tells me that he prefers to to be treated by antibiotics rather than go through surgery.  Patient has been trying to spread out his pain medications.  He also request repeat UA as his admission UA showed cocaine.   Patient reports continued depression and feeling hopeless.  Patient remains isolative.  Reports some improvement in his suicidal thoughts.  He is also somewhat future oriented trying to plan his life after discharge.    Appetite:  [x] Normal/Adequate/Unchanged  [] Increased  [] Decreased      Sleep:       [] Normal/Adequate/Unchanged  [x] Fair  [] Poor      Group Attendance on Unit:   [] Yes  [] Selectively    [x] No    Medication Side Effects:  Patient denies any medication side effects at the time of assessment.         Mental Status Exam  Level of consciousness: Alert and awake.   Appearance: Appropriate attire for setting, resting in bed, with fair  grooming and hygiene.   Behavior/Motor: Approachable and coherent  Attitude toward examiner: Cooperative, attentive, friendly, good eye contact.  Speech: Normal rate, normal volume, normal tone.  Mood:  Patient reports \"Ok. I'm fine.\".   Affect: calm  Thought processes: Linear and coherent.   Thought content: Denies homicidal ideation.  Suicidal Ideation: Reports improvement in suicidal ideations  Delusions: No evidence of delusions. Denies paranoia.  Perceptual Disturbance: Patient does not appear to be responding to internal  Abilify 2 mg for continued depression  Attempt to develop insight  Psycho-education conducted.  Supportive Therapy conducted.  Probable discharge is 3-5 days  Follow-up daily while on inpatient unit

## 2025-03-11 NOTE — GROUP NOTE
Group Therapy Note    Date: 3/11/2025    Group Start Time: 1330  Group End Time: 1415  Group Topic: Cognitive Skills    Isabelle Cox CTRS    Cognitive Skills Group Note        Date: March 11, 2025 Start Time: 1:30pm  End Time:  215pm      Number of Participants in Group & Unit Census:  410    Topic: cognitive skills     Goal of Group: pt will demonstrate improved coping skills and improved interpersonal relationships       Comments:     Patient did not participate in Cognitive Skills group, despite staff encouragement and explanation of benefits.  Patient remain seclusive to self.  Q15 minute safety checks maintained for patient safety and will continue to encourage patient to attend unit programming.              Signature:  GARDENIA ROCHE

## 2025-03-12 LAB
AMPHET UR QL SCN: NEGATIVE
BARBITURATES UR QL SCN: NEGATIVE
BENZODIAZ UR QL: NEGATIVE
CANNABINOIDS UR QL SCN: NEGATIVE
COCAINE UR QL SCN: NEGATIVE
FENTANYL UR QL: NEGATIVE
METHADONE UR QL: NEGATIVE
OPIATES UR QL SCN: NEGATIVE
OXYCODONE UR QL SCN: POSITIVE
PCP UR QL SCN: NEGATIVE
TEST INFORMATION: ABNORMAL

## 2025-03-12 PROCEDURE — 6370000000 HC RX 637 (ALT 250 FOR IP): Performed by: PSYCHIATRY & NEUROLOGY

## 2025-03-12 PROCEDURE — 6370000000 HC RX 637 (ALT 250 FOR IP)

## 2025-03-12 PROCEDURE — 80307 DRUG TEST PRSMV CHEM ANLYZR: CPT

## 2025-03-12 PROCEDURE — 1240000000 HC EMOTIONAL WELLNESS R&B

## 2025-03-12 PROCEDURE — 99231 SBSQ HOSP IP/OBS SF/LOW 25: CPT | Performed by: INTERNAL MEDICINE

## 2025-03-12 PROCEDURE — 99232 SBSQ HOSP IP/OBS MODERATE 35: CPT | Performed by: PSYCHIATRY & NEUROLOGY

## 2025-03-12 RX ADMIN — TRAZODONE HYDROCHLORIDE 50 MG: 50 TABLET ORAL at 21:00

## 2025-03-12 RX ADMIN — TAMSULOSIN HYDROCHLORIDE 0.4 MG: 0.4 CAPSULE ORAL at 08:30

## 2025-03-12 RX ADMIN — ASPIRIN 81 MG: 81 TABLET, CHEWABLE ORAL at 08:29

## 2025-03-12 RX ADMIN — ARIPIPRAZOLE 2 MG: 2 TABLET ORAL at 08:29

## 2025-03-12 RX ADMIN — METOPROLOL TARTRATE 25 MG: 25 TABLET, FILM COATED ORAL at 21:00

## 2025-03-12 RX ADMIN — GABAPENTIN 800 MG: 400 CAPSULE ORAL at 08:50

## 2025-03-12 RX ADMIN — PANTOPRAZOLE SODIUM 40 MG: 40 TABLET, DELAYED RELEASE ORAL at 07:00

## 2025-03-12 RX ADMIN — FOLIC ACID 1 MG: 1 TABLET ORAL at 08:29

## 2025-03-12 RX ADMIN — GABAPENTIN 800 MG: 400 CAPSULE ORAL at 14:58

## 2025-03-12 RX ADMIN — OXYCODONE HYDROCHLORIDE AND ACETAMINOPHEN 1 TABLET: 5; 325 TABLET ORAL at 01:33

## 2025-03-12 RX ADMIN — GABAPENTIN 800 MG: 400 CAPSULE ORAL at 21:00

## 2025-03-12 RX ADMIN — DOXYCYCLINE 100 MG: 100 CAPSULE ORAL at 08:29

## 2025-03-12 RX ADMIN — OXYCODONE HYDROCHLORIDE AND ACETAMINOPHEN 1 TABLET: 5; 325 TABLET ORAL at 11:54

## 2025-03-12 RX ADMIN — AMLODIPINE BESYLATE 10 MG: 10 TABLET ORAL at 08:29

## 2025-03-12 RX ADMIN — METOPROLOL TARTRATE 25 MG: 25 TABLET, FILM COATED ORAL at 08:29

## 2025-03-12 RX ADMIN — DOCUSATE SODIUM 100 MG: 100 CAPSULE, LIQUID FILLED ORAL at 07:00

## 2025-03-12 RX ADMIN — OXYCODONE HYDROCHLORIDE AND ACETAMINOPHEN 1 TABLET: 5; 325 TABLET ORAL at 07:01

## 2025-03-12 RX ADMIN — ATORVASTATIN CALCIUM 10 MG: 10 TABLET, FILM COATED ORAL at 08:29

## 2025-03-12 RX ADMIN — OXYCODONE HYDROCHLORIDE AND ACETAMINOPHEN 1 TABLET: 5; 325 TABLET ORAL at 20:59

## 2025-03-12 RX ADMIN — DULOXETINE HYDROCHLORIDE 60 MG: 60 CAPSULE, DELAYED RELEASE ORAL at 08:29

## 2025-03-12 ASSESSMENT — PAIN DESCRIPTION - ORIENTATION: ORIENTATION: LEFT

## 2025-03-12 ASSESSMENT — PAIN - FUNCTIONAL ASSESSMENT
PAIN_FUNCTIONAL_ASSESSMENT: ACTIVITIES ARE NOT PREVENTED

## 2025-03-12 ASSESSMENT — PAIN DESCRIPTION - DESCRIPTORS
DESCRIPTORS: ACHING;BURNING
DESCRIPTORS: ACHING

## 2025-03-12 ASSESSMENT — PAIN DESCRIPTION - LOCATION
LOCATION: FOOT
LOCATION: OTHER (COMMENT)
LOCATION: LEG

## 2025-03-12 ASSESSMENT — PAIN SCALES - GENERAL
PAINLEVEL_OUTOF10: 7
PAINLEVEL_OUTOF10: 10
PAINLEVEL_OUTOF10: 0

## 2025-03-12 NOTE — GROUP NOTE
Group Therapy Note    Date: 3/12/2025    Group Start Time: 1000  Group End Time: 1020  Group Topic: Psychotherapy     Bella Glynn MSW        Group Therapy Note    Attendees: 6/11     Patient was offered group therapy today but declined to participate despite encouragement from staff.  1:1 was offered.    Discipline Responsible: /Counselor      Signature:  JOSEPH Alexis

## 2025-03-12 NOTE — GROUP NOTE
Group Therapy Note    Date: 3/12/2025    Group Start Time: 0930  Group End Time: 0946  Group Topic: Discharge Planning    [facility]    Saundra Layne LPN        Group Therapy Note       Patient did not participate in Goals  group, despite staff encouragement and explanation of benefits.  Patient remain seclusive to self.  Q15 minute safety checks maintained for patient safety and will continue to encourage patient to attend unit programming.      Signature:  Saundra Layne LPN

## 2025-03-12 NOTE — PLAN OF CARE
Patient denies any suicidal, homicidal ideation.  Patient denies anxiety and depression at the time of assessment.  Patient is indepenent with ADL's.  Patient reports eating and sleeping adequately.  Patient denies audio/visual Hallucinations  Patient is complaint with scheduled medications and remains in behavioral control.  Staff will contiune with safety checks Q15 mintues and at irregular intervals.  Patient is friendly, comes out for needs, transfers self. Left leg amputee. Patient has no attempt to elope. Pain has denied pain but is controlled through scheduled pain medications. Patient is pleasant. Cooperative.        Problem: Self Harm/Suicidality  Goal: Will have no self-injury during hospital stay  Description: INTERVENTIONS:  1.  Ensure constant observer at bedside with Q15M safety checks  2.  Maintain a safe environment  3.  Secure patient belongings  4.  Ensure family/visitors adhere to safety recommendations  5.  Ensure safety tray has been added to patient's diet order  6.  Every shift and PRN: Re-assess suicidal risk via Frequent Screener    Outcome: Progressing     Problem: Depression  Goal: Will be euthymic at discharge  Description: INTERVENTIONS:  1. Administer medication as ordered  2. Provide emotional support via 1:1 interaction with staff  3. Encourage involvement in milieu/groups/activities  4. Monitor for social isolation  Outcome: Progressing     Problem: Safety - Adult  Goal: Free from fall injury  Outcome: Progressing     Problem: Risk for Elopement  Goal: Patient will not exit the unit/facility without proper excort  Outcome: Progressing     Problem: Pain  Goal: Verbalizes/displays adequate comfort level or baseline comfort level  Outcome: Progressing     Problem: Nutrition Deficit:  Goal: Optimize nutritional status  Outcome: Progressing     Problem: Skin/Tissue Integrity  Goal: Skin integrity remains intact  Description: 1.  Monitor for areas of redness and/or skin breakdown  2.   Assess vascular access sites hourly  3.  Every 4-6 hours minimum:  Change oxygen saturation probe site  4.  Every 4-6 hours:  If on nasal continuous positive airway pressure, respiratory therapy assess nares and determine need for appliance change or resting period  Outcome: Progressing

## 2025-03-12 NOTE — PROGRESS NOTES
chewable tablet 81 mg, 81 mg, Oral, Daily  atorvastatin (LIPITOR) tablet 10 mg, 10 mg, Oral, Daily  docusate sodium (COLACE) capsule 100 mg, 100 mg, Oral, BID PRN  folic acid (FOLVITE) tablet 1 mg, 1 mg, Oral, Daily  tamsulosin (FLOMAX) capsule 0.4 mg, 0.4 mg, Oral, Daily  gabapentin (NEURONTIN) capsule 800 mg, 800 mg, Oral, TID  ibuprofen (ADVIL;MOTRIN) tablet 600 mg, 600 mg, Oral, Q6H PRN  cyclobenzaprine (FLEXERIL) tablet 10 mg, 10 mg, Oral, Nightly PRN  DULoxetine (CYMBALTA) extended release capsule 60 mg, 60 mg, Oral, Daily  oxyCODONE-acetaminophen (PERCOCET) 5-325 MG per tablet 1 tablet, 1 tablet, Oral, Q4H PRN    ASSESSMENT  MDD (major depressive disorder), recurrent severe, without psychosis (HCC)           PLAN  Patient s symptoms  improving , added Abilify 2 mg for continued depression, patient is tolerating with no side effects  Attempt to develop insight  Psycho-education conducted  Supportive Therapy conducted.  Probable discharge is 3-5 days  Follow-up daily while on inpatient unit

## 2025-03-12 NOTE — BH NOTE
Patient had a dressing change this morning at 0700 am at his request, third shift changed it before they left their shift,     I went to lidia and told him I was ready to give him a new dressing, He replied with \"I had one this morning, I dont want it\" I explained to him that we needed to do it per order and that it helps keep it clean and prevents infections. He refused and said he didn't want it and that he wanted it at night shift.

## 2025-03-12 NOTE — CARE COORDINATION
Patient requested to speak to Social Work,  He stated that he wanted to be discharged. Social Work explained that the decision for discharge was up to the doctor. He expressed understanding, but requested Social Work advocate for his discharge due to him not having any issues with his mental health at this time and wanting to return home and resume his services with his home healthcare agency. He also requested that whenever discharge does happen that his medications be filled here. He again requested discharge and for Social Work to speak with the doctor.    MD updated via [employer].

## 2025-03-12 NOTE — PROGRESS NOTES
Physical Therapy  DATE: 3/12/2025    NAME: Asim Back  MRN: [id number]   : 1970    Patient not seen this date for Physical Therapy due to:      [] Cancel by RN or physician due to:    [] Hemodialysis    [] Critical Lab Value Level     [] Blood transfusion in progress    [] Acute or unstable cardiovascular status   _MAP < 55 or more than >115  _HR < 40 or > 130    [] Acute or unstable pulmonary status   -FiO2 > 60%   _RR < 5 or >40    _O2 sats < 85%    [] Strict Bedrest    [] Off Unit for surgery or procedure    [] Off Unit for testing       [] Pending imaging to R/O fracture    [x] Refusal by Patient; checked on pt @ 1353. Pt lying in bed upon arrival. Pt stating he does not want to participate in therapy this date. Pt has refused 3x consecutively. Pt on HOLD at this time for therapy.      [] Other      [] PT being discontinued at this time. Patient independent. No further needs.     [] PT being discontinued at this time as the patient has been transferred to hospice care. No further needs.      Electronically signed by Manasa Meyers PTA on 3/12/25 at 3:23 PM EDT

## 2025-03-12 NOTE — GROUP NOTE
Group Therapy Note    Date: 3/12/2025    Group Start Time: 1430  Group End Time: 1530  Group Topic: Cognitive Skills    Kelly Fernando CTRS        Group Therapy Note    Attendees: 6/11     Topic:To increase socialization, practice self expression and relating to peers, validation   of positive qualities by RT and peers, and communication skills.         Patient did not participate in Cognitive Skills Group, at 1430, despite staff encouragement.   Pt was seclusive to self and room . Modifications to Group available as needed depending upon pt needs.         Q15 minute safety checks maintained for patient safety and will continue to encourage   patient to attend unit programming.       Discipline Responsible: Psychoeducational Specialist   Signature: GARDENIA KOLB

## 2025-03-12 NOTE — PROGRESS NOTES
[facility] Internal Medicine  Lambert Cai MD; Giuseppe Cummings MD, Sánchez Portillo MD, Parisa Haynes MD, Dr Bradford Madrigal MD; Jerome Kaur MD    [facility] Internal Medicine   IN-PATIENT SERVICE   [facility]     Progress Note            Date:   3/12/2025  Patient name:  Asim Back  Date of admission:  3/3/2025  2:14 AM  MRN:   [id number]  Account:  [id number]  YOB: 1970  PCP:    Vinita Agrawal APRN - NP  Room:   [address] [place] [address]  Code Status:    Full Code      Chief Complaint:     Suicidal /Ac Psychosis    History Obtained From:     Patient/EMR/bedside RN     History of Present Illness:     55-year-old male with past medical history as mentioned below.  Admitted for depression with suicidal ideation.    Past Medical History:     Past Medical History:   Diagnosis Date    Hypertension         Past Surgical History:     Past Surgical History:   Procedure Laterality Date    DEBRIDEMENT Left     Left foot        Medications Prior to Admission:     Prior to Admission medications    Medication Sig Start Date End Date Taking? Authorizing Provider   docusate sodium (COLACE) 100 MG capsule Take 1 capsule by mouth 2 times daily 2/5/25   Edith Santo MD   Lactobacillus (ACIDOPHILUS PROBIOTIC PO) Take 1 tablet by mouth daily 2/5/25   Edith Santo MD   lidocaine (XYLOCAINE) 5 % ointment Apply topically 3 times daily as needed for Pain 1/21/25   Edith Santo MD   metoprolol tartrate (LOPRESSOR) 25 MG tablet Take 1 tablet by mouth 2 times daily 2/5/25   Edith Santo MD   oxyCODONE-acetaminophen (PERCOCET)  MG per tablet Take 1 tablet by mouth 3 times daily as needed (pain greater than 7). 2/18/25   Edith Santo MD   risperiDONE (RISPERDAL) 0.25 MG tablet Take 1 tablet by mouth daily 1/6/25   Edith Santo MD   nicotine (NICODERM CQ) 14 MG/24HR Place 1 patch onto the skin daily 12/17/24

## 2025-03-12 NOTE — PLAN OF CARE
Problem: Self Harm/Suicidality  Goal: Will have no self-injury during hospital stay  Description: INTERVENTIONS:  1.  Ensure constant observer at bedside with Q15M safety checks  2.  Maintain a safe environment  3.  Secure patient belongings  4.  Ensure family/visitors adhere to safety recommendations  5.  Ensure safety tray has been added to patient's diet order  6.  Every shift and PRN: Re-assess suicidal risk via Frequent Screener    3/11/2025 2055 by Kay Moreland RN  Outcome: Progressing     Problem: Depression  Goal: Will be euthymic at discharge  Description: INTERVENTIONS:  1. Administer medication as ordered  2. Provide emotional support via 1:1 interaction with staff  3. Encourage involvement in milieu/groups/activities  4. Monitor for social isolation  3/11/2025 2055 by Kay Moreland, RN  Outcome: Progressing  Note: Patient is isolative to self in day room. He is medication compliant. Patient is irritable on approach he is blunt in appearance. Patient denies suicidal ideation. He is depressed and anxious. Patient request trazodone for sleep as this has been helpful. Staff maintains Q15 minute safety checks.

## 2025-03-13 PROBLEM — R45.851 DEPRESSION WITH SUICIDAL IDEATION: Status: RESOLVED | Noted: 2025-03-03 | Resolved: 2025-03-13

## 2025-03-13 PROBLEM — F32.A DEPRESSION WITH SUICIDAL IDEATION: Status: RESOLVED | Noted: 2025-03-03 | Resolved: 2025-03-13

## 2025-03-13 PROCEDURE — 6370000000 HC RX 637 (ALT 250 FOR IP): Performed by: PSYCHIATRY & NEUROLOGY

## 2025-03-13 PROCEDURE — 6370000000 HC RX 637 (ALT 250 FOR IP)

## 2025-03-13 PROCEDURE — 99232 SBSQ HOSP IP/OBS MODERATE 35: CPT | Performed by: PSYCHIATRY & NEUROLOGY

## 2025-03-13 PROCEDURE — APPSS30 APP SPLIT SHARED TIME 16-30 MINUTES: Performed by: NURSE PRACTITIONER

## 2025-03-13 PROCEDURE — 1240000000 HC EMOTIONAL WELLNESS R&B

## 2025-03-13 PROCEDURE — 99232 SBSQ HOSP IP/OBS MODERATE 35: CPT | Performed by: INTERNAL MEDICINE

## 2025-03-13 RX ORDER — FOLIC ACID 1 MG/1
1 TABLET ORAL DAILY
Qty: 30 TABLET | Refills: 3 | Status: SHIPPED | OUTPATIENT
Start: 2025-03-14

## 2025-03-13 RX ORDER — DULOXETIN HYDROCHLORIDE 60 MG/1
60 CAPSULE, DELAYED RELEASE ORAL DAILY
Qty: 30 CAPSULE | Refills: 3 | Status: SHIPPED | OUTPATIENT
Start: 2025-03-14

## 2025-03-13 RX ORDER — ARIPIPRAZOLE 2 MG/1
2 TABLET ORAL DAILY
Qty: 30 TABLET | Refills: 3 | Status: SHIPPED | OUTPATIENT
Start: 2025-03-14

## 2025-03-13 RX ORDER — AMLODIPINE BESYLATE 10 MG/1
10 TABLET ORAL DAILY
Qty: 30 TABLET | Refills: 3 | Status: SHIPPED | OUTPATIENT
Start: 2025-03-14

## 2025-03-13 RX ORDER — OXYCODONE AND ACETAMINOPHEN 5; 325 MG/1; MG/1
1 TABLET ORAL EVERY 6 HOURS PRN
Qty: 28 TABLET | Refills: 0 | Status: SHIPPED | OUTPATIENT
Start: 2025-03-13 | End: 2025-03-20

## 2025-03-13 RX ORDER — PSEUDOEPHEDRINE HCL 30 MG
100 TABLET ORAL 2 TIMES DAILY PRN
Qty: 30 CAPSULE | Refills: 0 | Status: SHIPPED | OUTPATIENT
Start: 2025-03-13 | End: 2025-04-12

## 2025-03-13 RX ADMIN — CYCLOBENZAPRINE 10 MG: 10 TABLET, FILM COATED ORAL at 21:07

## 2025-03-13 RX ADMIN — GABAPENTIN 800 MG: 400 CAPSULE ORAL at 08:33

## 2025-03-13 RX ADMIN — OXYCODONE HYDROCHLORIDE AND ACETAMINOPHEN 1 TABLET: 5; 325 TABLET ORAL at 19:24

## 2025-03-13 RX ADMIN — TRAZODONE HYDROCHLORIDE 50 MG: 50 TABLET ORAL at 21:12

## 2025-03-13 RX ADMIN — OXYCODONE HYDROCHLORIDE AND ACETAMINOPHEN 1 TABLET: 5; 325 TABLET ORAL at 02:53

## 2025-03-13 RX ADMIN — PANTOPRAZOLE SODIUM 40 MG: 40 TABLET, DELAYED RELEASE ORAL at 07:58

## 2025-03-13 RX ADMIN — METOPROLOL TARTRATE 25 MG: 25 TABLET, FILM COATED ORAL at 07:57

## 2025-03-13 RX ADMIN — METOPROLOL TARTRATE 25 MG: 25 TABLET, FILM COATED ORAL at 21:07

## 2025-03-13 RX ADMIN — ATORVASTATIN CALCIUM 10 MG: 10 TABLET, FILM COATED ORAL at 07:56

## 2025-03-13 RX ADMIN — AMLODIPINE BESYLATE 10 MG: 10 TABLET ORAL at 07:57

## 2025-03-13 RX ADMIN — FOLIC ACID 1 MG: 1 TABLET ORAL at 07:57

## 2025-03-13 RX ADMIN — TAMSULOSIN HYDROCHLORIDE 0.4 MG: 0.4 CAPSULE ORAL at 07:57

## 2025-03-13 RX ADMIN — CYCLOBENZAPRINE 10 MG: 10 TABLET, FILM COATED ORAL at 02:53

## 2025-03-13 RX ADMIN — DULOXETINE HYDROCHLORIDE 60 MG: 60 CAPSULE, DELAYED RELEASE ORAL at 07:57

## 2025-03-13 RX ADMIN — GABAPENTIN 800 MG: 400 CAPSULE ORAL at 14:28

## 2025-03-13 RX ADMIN — ASPIRIN 81 MG: 81 TABLET, CHEWABLE ORAL at 07:56

## 2025-03-13 RX ADMIN — OXYCODONE HYDROCHLORIDE AND ACETAMINOPHEN 1 TABLET: 5; 325 TABLET ORAL at 11:57

## 2025-03-13 RX ADMIN — OXYCODONE HYDROCHLORIDE AND ACETAMINOPHEN 1 TABLET: 5; 325 TABLET ORAL at 07:57

## 2025-03-13 RX ADMIN — ARIPIPRAZOLE 2 MG: 2 TABLET ORAL at 07:57

## 2025-03-13 RX ADMIN — GABAPENTIN 800 MG: 400 CAPSULE ORAL at 21:07

## 2025-03-13 RX ADMIN — HYDROXYZINE HYDROCHLORIDE 50 MG: 50 TABLET, FILM COATED ORAL at 21:12

## 2025-03-13 ASSESSMENT — PAIN SCALES - GENERAL
PAINLEVEL_OUTOF10: 8
PAINLEVEL_OUTOF10: 10
PAINLEVEL_OUTOF10: 7
PAINLEVEL_OUTOF10: 5

## 2025-03-13 ASSESSMENT — PAIN DESCRIPTION - ORIENTATION
ORIENTATION: LEFT
ORIENTATION: RIGHT;LEFT
ORIENTATION: LEFT

## 2025-03-13 ASSESSMENT — PAIN DESCRIPTION - DESCRIPTORS
DESCRIPTORS: STABBING
DESCRIPTORS: ACHING
DESCRIPTORS: BURNING

## 2025-03-13 ASSESSMENT — PAIN - FUNCTIONAL ASSESSMENT: PAIN_FUNCTIONAL_ASSESSMENT: ACTIVITIES ARE NOT PREVENTED

## 2025-03-13 ASSESSMENT — LIFESTYLE VARIABLES
HOW MANY STANDARD DRINKS CONTAINING ALCOHOL DO YOU HAVE ON A TYPICAL DAY: PATIENT DECLINED
HOW OFTEN DO YOU HAVE A DRINK CONTAINING ALCOHOL: PATIENT DECLINED

## 2025-03-13 ASSESSMENT — PAIN DESCRIPTION - LOCATION
LOCATION: LEG
LOCATION: BACK;FOOT
LOCATION: FOOT

## 2025-03-13 NOTE — CARE COORDINATION
Writer spoke to Alie from [facility]. Transport to pick patient up at 10AM.     Writer spoke to Eleanor Lee from [facility] ([phone]). She requests AVS and CRF to be faxed to [phone].

## 2025-03-13 NOTE — CARE COORDINATION
Discharge Arrangements:  Return to senior living    Guardian notified: n/a    Discharge destination/address: Home- address on facesheet    Transported by: [facility] at 10AM     Patient was not accepting of referral.  Follow up appointment is scheduled for 3/20 at 11AM at Dr. Merlin Mao [facility]    *LUCIA resources were offered to patient throughout admission and at time of discharge. This list of [facility] LUCIA providers was provided to patient:     [facility]  [address] [facility] [address]   [address] [facility] [address]  Phone: [phone]     Phone: [phone]    [facility]  [place]   [address] [facility] [address]   [address] [facility] [address]  Phone: [phone]     Phone: [phone]    [facility]  [address] [facility] [address]    [address] [facility] [address]  Phone: [phone]     Phone: [phone]    [facility]     [facility]   [address] [facility] [address]  [place] [address]      Phone: [phone]  Phone: [phone]        [facility]  [address] [facility] [address]   [address] [place] [address]  Phone: [phone]     Phone:  [phone]    [employer]      [facility]  [address] [facility] [address]   [address] [facility] [address]  Phone: [phone]     Phone: [phone]    [facility]  [address] [facility] [address]   [address] [facility] [address]  Phone: [phone]     Phone: [phone]    [facility]  [facility]  [address] [facility] [address] [address] [facility] [address]  Phone: [phone]     Phone: [phone]    [facility]     [facility]  [facility] [address]  [place] [address]   [address] [facility] [address]  Phone: [phone]     Phone: [phone]    [facility]    [facility]  [address] [facility] [address]  [address] [place], [place] [address]  Phone: [phone]     Phone: [phone]    [facility]      [facility]  [address] [facility] [address]    [address] [facility] [address]  Phone: [phone]     Phone: [phone]    [facility]   [address] [facility] [address]   [address] [facility] [address] [facility] [address]  Phone: [phone]     Phone: [phone] Ext: [phone]    [place] [address] [facility] [address] or  [address] [facility] [address] or  [address] [facility] [address]  Phone: [phone]    [facility]  [facility]  [address] [facility] [address]    [address] [place] [address]  [facility] [address]     Phone: [phone]  Phone: [phone]          [facility]  [address] [facility] [address]   [address] [facility] [address]  Phone: [phone]     Phone: [phone]

## 2025-03-13 NOTE — GROUP NOTE
Group Therapy Note    Date: 3/13/2025    Group Start Time: 1100  Group End Time: 1140  Group Topic: Cognitive Skills    Dariela Lin CTRS        Group Therapy Note    Attendees: 9/12    Cognitive Skills Group Note        Date: March 13, 2025       Start Time: 11am  End Time: 11:40am      Number of Participants in Group & Unit Census:  9/12    Topic:  interpersonal skills, memory recall, self-expression     Goal of Group: To improve interpersonal skills and memory recall through collaborating with peers and demonstrating self-expression.       Comments:     Patient did not participate in Cognitive Skills group, despite staff encouragement and explanation of benefits.  Patient remain seclusive to self.  Q15 minute safety checks maintained for patient safety and will continue to encourage patient to attend unit programming.        Signature:  GARDENIA Finney

## 2025-03-13 NOTE — PROGRESS NOTES
Daily Progress Note  3/13/2025    Patient Name: Asim Back    CHIEF COMPLAINT:   Depression with suicidal ideation           SUBJECTIVE:    Patient was seen for follow-up assessment today.  He remains compliant with scheduled medications and behaviorally in control.  He has not required any emergency medications for agitation in the past 24 hours.  Nursing staff report patient has been discharged focused.  He remains focused on Percocet today as well.  He has been spending some time in the day area.  Patient was approached in his room where he was found to be resting in bed but awake.  He reported his mood as \"fine\".  And reports that his mood is an 8/10, 10 being the best mood possible.  However affect was flat.  Patient states that he is to be discharged \"home\" tomorrow.  He is denying any suicidal or homicidal ideation.  He feels hospitalization and medication have been beneficial.  He is denying auditory and visual hallucinations.  Patient feels he is able to contract for safety in the community.     Appetite:  [x] Normal/Adequate/Unchanged  [] Increased  [] Decreased      Sleep:       [] Normal/Adequate/Unchanged  [x] Fair  [] Poor      Group Attendance on Unit:   [] Yes  [] Selectively    [x] No    Medication Side Effects:  Patient denies any medication side effects at the time of assessment.         Mental Status Exam  Level of consciousness: Alert and awake.   Appearance: Appropriate attire for setting, resting in bed, with fair  grooming and hygiene.   Behavior/Motor: Approachable and coherent  Attitude toward examiner: Cooperative, attentive, friendly, good eye contact.  Speech: Normal rate, normal volume, normal tone.  Mood: \"Fine\"  Affect: Blunted  Thought processes: Linear and coherent.   Thought content: Denies homicidal ideation.  Suicidal Ideation: denies suicidal ideations  Delusions: No evidence of delusions. Denies paranoia.  Perceptual Disturbance: Patient does not appear to be responding to  (GLYCOLAX) packet 17 g, 17 g, Oral, Daily PRN  traZODone (DESYREL) tablet 50 mg, 50 mg, Oral, Nightly PRN  pantoprazole (PROTONIX) tablet 40 mg, 40 mg, Oral, QAM AC  aspirin chewable tablet 81 mg, 81 mg, Oral, Daily  atorvastatin (LIPITOR) tablet 10 mg, 10 mg, Oral, Daily  docusate sodium (COLACE) capsule 100 mg, 100 mg, Oral, BID PRN  folic acid (FOLVITE) tablet 1 mg, 1 mg, Oral, Daily  tamsulosin (FLOMAX) capsule 0.4 mg, 0.4 mg, Oral, Daily  gabapentin (NEURONTIN) capsule 800 mg, 800 mg, Oral, TID  ibuprofen (ADVIL;MOTRIN) tablet 600 mg, 600 mg, Oral, Q6H PRN  cyclobenzaprine (FLEXERIL) tablet 10 mg, 10 mg, Oral, Nightly PRN  DULoxetine (CYMBALTA) extended release capsule 60 mg, 60 mg, Oral, Daily  oxyCODONE-acetaminophen (PERCOCET) 5-325 MG per tablet 1 tablet, 1 tablet, Oral, Q4H PRN    ASSESSMENT  MDD (major depressive disorder), recurrent severe, without psychosis (HCC)         PATIENT HANDOFF  Patient symptoms are: Improving  Monitor need and frequency of PRN medications.  Encourage participation in groups and milieu.  Medication changes and discharge planning per attending  Follow-up daily while inpatient.      Patient continues to be monitored in the inpatient psychiatric facility at [facility] for safety and stabilization. Patient continues to need, on a daily basis, active treatment furnished directly by or requiring the supervision of inpatient psychiatric personnel.     Electronically signed by MICHELLE Rollins CNP on 3/13/2025 at 3:05 PM     **This report has been created using voice recognition software. It may contain minor errors which are inherent in voice recognition technology.**    I independently saw and evaluated the patient.  I reviewed the nurse practitioners documentation above.  Principle diagnosis we are treating for is MDD (major depressive disorder), recurrent severe, without psychosis (HCC). Any additional comments or changes to the nurse practitioners documentation are stated below

## 2025-03-13 NOTE — PROGRESS NOTES
CLINICAL PHARMACY NOTE: MEDS TO BEDS    Total # of Prescriptions Filled: 4   The following medications were delivered to the patient:  Oxycodone/APAP 5/325mg  Aripiprazole 2mg  Duloxetine 60mg  Amlodipine 10mg    Additional Documentation: 3/13/25 3:05pm kbadolfo delivered to [facility] between unit doors    -Folic 1mg not covered  -Docusate 100mg Over The Counter not covered

## 2025-03-13 NOTE — PROGRESS NOTES
[facility] Internal Medicine  Lambert Cai MD; Giuseppe Cummings MD, Sánchez Portillo MD, Parisa Haynes MD, Dr Bradford Madrigal MD; Jerome Kaur MD    [facility] Internal Medicine   IN-PATIENT SERVICE   [facility]     Progress Note            Date:   3/13/2025  Patient name:  Asim Back  Date of admission:  3/3/2025  2:14 AM  MRN:   [id number]  Account:  [id number]  YOB: 1970  PCP:    Vinita Agrawal APRN - NP  Room:   [address] [place] [address]  Code Status:    Full Code      Chief Complaint:     Suicidal /Ac Psychosis    History Obtained From:     Patient/EMR/bedside RN     History of Present Illness:     55-year-old male with past medical history as mentioned below.  Admitted for depression with suicidal ideation.    Past Medical History:     Past Medical History:   Diagnosis Date    Hypertension         Past Surgical History:     Past Surgical History:   Procedure Laterality Date    DEBRIDEMENT Left     Left foot        Medications Prior to Admission:     Prior to Admission medications    Medication Sig Start Date End Date Taking? Authorizing Provider   docusate sodium (COLACE) 100 MG capsule Take 1 capsule by mouth 2 times daily 2/5/25   Edith Santo MD   Lactobacillus (ACIDOPHILUS PROBIOTIC PO) Take 1 tablet by mouth daily 2/5/25   Edith Santo MD   lidocaine (XYLOCAINE) 5 % ointment Apply topically 3 times daily as needed for Pain 1/21/25   Edith Santo MD   metoprolol tartrate (LOPRESSOR) 25 MG tablet Take 1 tablet by mouth 2 times daily 2/5/25   Edith Santo MD   oxyCODONE-acetaminophen (PERCOCET)  MG per tablet Take 1 tablet by mouth 3 times daily as needed (pain greater than 7). 2/18/25   Edith Santo MD   risperiDONE (RISPERDAL) 0.25 MG tablet Take 1 tablet by mouth daily 1/6/25   Edith Santo MD   nicotine (NICODERM CQ) 14 MG/24HR Place 1 patch onto the skin daily 12/17/24    seen and examined at bedside.  Podiatry has yet to evaluate the patient.  Foul-smelling discharge, wound care.  Continue doxycycline.  CT scan reviewed.  Chronic osteomyelitis, cellulitis, acute component could not be excluded.  Will consult ID.  Continue with the antibiotic for now.  Monitor for any fevers.  Labs medication and vitals reviewed.  Id and podiatry input pending    March 13  ID input noted  Oral doxy for chronic supression  Pt refusing surg   Ok to dc with out pt with dr burns        Consultations:   IP CONSULT TO INTERNAL MEDICINE  IP CONSULT TO PODIATRY  IP CONSULT TO INFECTIOUS DISEASES  IP CONSULT TO ASHIA Cai MD  3/13/2025  10:16 AM    Copy sent to Vinita Reid, APRN - NP    Please note that this chart was generated using voice recognition Dragon dictation software.  Although every effort was made to ensure the accuracy of this automated transcription, some errors in transcription may have occurred.

## 2025-03-13 NOTE — PLAN OF CARE
Problem: Self Harm/Suicidality  Goal: Will have no self-injury during hospital stay  Description: INTERVENTIONS:  1.  Ensure constant observer at bedside with Q15M safety checks  2.  Maintain a safe environment  3.  Secure patient belongings  4.  Ensure family/visitors adhere to safety recommendations  5.  Ensure safety tray has been added to patient's diet order  6.  Every shift and PRN: Re-assess suicidal risk via Frequent Screener    3/12/2025 2158 by Gracy Condon, RN  Outcome: Progressing     Problem: Safety - Adult  Goal: Free from fall injury  3/12/2025 2158 by Gracy Condon, RN  Outcome: Progressing

## 2025-03-13 NOTE — PLAN OF CARE
Problem: Self Harm/Suicidality  Goal: Will have no self-injury during hospital stay  Description: INTERVENTIONS:  1.  Ensure constant observer at bedside with Q15M safety checks  2.  Maintain a safe environment  3.  Secure patient belongings  4.  Ensure family/visitors adhere to safety recommendations  5.  Ensure safety tray has been added to patient's diet order  6.  Every shift and PRN: Re-assess suicidal risk via Frequent Screener  3/13/2025 1125 by Rubi Guan, RN  Outcome: Progressing   Patient denied suicidal ideations. Safe environment maintained. Safety checks every 15 minutes continued per facility policy.     Problem: Depression  Goal: Will be euthymic at discharge  Description: INTERVENTIONS:  1. Administer medication as ordered  2. Provide emotional support via 1:1 interaction with staff  3. Encourage involvement in milieu/groups/activities  4. Monitor for social isolation  Outcome: Progressing   Patient denied depression. He stated \"I feel fine\". He is not attending groups. He spends a lot of time in room. He is social with peers when he is out for meals.     Problem: Safety - Adult  Goal: Free from fall injury  3/13/2025 1125 by Rubi Guan, RN  Outcome: Progressing   Patient remains free from falls. Patient is wearing gripper socks and using wheelchair.     Problem: Risk for Elopement  Goal: Patient will not exit the unit/facility without proper excort  Outcome: Progressing     Problem: Pain  Goal: Verbalizes/displays adequate comfort level or baseline comfort level  Outcome: Progressing   Patient reports chronic pain in back and left foot. He is using his percocet and gabapentin for his pain.     Problem: Nutrition Deficit:  Goal: Optimize nutritional status  Outcome: Progressing   Patient reports adequate appetite. He is out for meals and is eating.     Problem: Skin/Tissue Integrity  Goal: Skin integrity remains intact  Description: 1.  Monitor for areas of redness and/or skin breakdown  2.

## 2025-03-13 NOTE — GROUP NOTE
Group Therapy Note    Date: 3/13/2025    Group Start Time: 0930  Group End Time: 0945  Group Topic: Nursing    LEONORA PEOPLES G        Comments:     Patient did not participate in  goals  group, despite staff encouragement and explanation of benefits.  Patient remain seclusive to self.  Q15 minute safety checks maintained for patient safety and will continue to encourage patient to attend unit programming.         Signature:  Emily Hughes RN

## 2025-03-13 NOTE — GROUP NOTE
Group Therapy Note    Date: 3/13/2025    Group Start Time: 1015  Group End Time: 1036  Group Topic: Nursing    Rubi Oliver, RN    Nursing  Group Note        Date: March 13, 2025 Start Time:  1015   End Time:  1036      Number of Participants in Group & Unit Census:  5/12    Topic: Positive Affirmations    Goal of Group:Discuss positive affirmations benefits and provide example       Comments:     Patient did not participate in  Nursing  group, despite staff encouragement and explanation of benefits.  Patient remain seclusive to self.  Q15 minute safety checks maintained for patient safety and will continue to encourage patient to attend unit programming.

## 2025-03-13 NOTE — CARE COORDINATION
Social work faxed transport forms to [facility] for intended discharge of 3/14.

## 2025-03-13 NOTE — DISCHARGE INSTRUCTIONS
[address] [facility] [address]  Phone: [phone]      Phone: [phone]    [facility]      [facility]  [address] [place] [address]    [address] [place] [facility] [address] [address]  Phone: [phone]      Phone: [phone]    [facility]     [facility]  [address] [facility] [address]   [address] [place], [place] [address]  Phone: [phone]      Phone: [phone]    [facility]       [facility]  [address] [facility] [address]     [address] [facility] [address]  Phone: [phone]      Phone: [phone]    [facility]   [address] [address]    [address] [facility] [address]  Phone: [phone]      Phone: [phone] Ext: [phone]    [place] [address] [facility] [address] / [address] [facility] [address] / [address] [facility] [address]  Phone: [phone]    [facility]   [facility]  [address] [facility] [address]  [address] [place] [address]  Phone: [phone]      Phone: [phone]    [facility]  [address] [facility] [address]    [address] [facility] [address]  Phone: [phone]      Phone: [phone]

## 2025-03-14 VITALS
DIASTOLIC BLOOD PRESSURE: 84 MMHG | BODY MASS INDEX: 23.62 KG/M2 | SYSTOLIC BLOOD PRESSURE: 118 MMHG | HEIGHT: 77 IN | RESPIRATION RATE: 16 BRPM | WEIGHT: 200 LBS | TEMPERATURE: 98 F | HEART RATE: 62 BPM | OXYGEN SATURATION: 97 %

## 2025-03-14 PROCEDURE — 99239 HOSP IP/OBS DSCHRG MGMT >30: CPT | Performed by: PSYCHIATRY & NEUROLOGY

## 2025-03-14 PROCEDURE — 6370000000 HC RX 637 (ALT 250 FOR IP)

## 2025-03-14 PROCEDURE — 6370000000 HC RX 637 (ALT 250 FOR IP): Performed by: PSYCHIATRY & NEUROLOGY

## 2025-03-14 RX ADMIN — TAMSULOSIN HYDROCHLORIDE 0.4 MG: 0.4 CAPSULE ORAL at 08:26

## 2025-03-14 RX ADMIN — OXYCODONE HYDROCHLORIDE AND ACETAMINOPHEN 1 TABLET: 5; 325 TABLET ORAL at 08:26

## 2025-03-14 RX ADMIN — ASPIRIN 81 MG: 81 TABLET, CHEWABLE ORAL at 08:26

## 2025-03-14 RX ADMIN — ATORVASTATIN CALCIUM 10 MG: 10 TABLET, FILM COATED ORAL at 08:26

## 2025-03-14 RX ADMIN — PANTOPRAZOLE SODIUM 40 MG: 40 TABLET, DELAYED RELEASE ORAL at 06:36

## 2025-03-14 RX ADMIN — FOLIC ACID 1 MG: 1 TABLET ORAL at 08:26

## 2025-03-14 RX ADMIN — OXYCODONE HYDROCHLORIDE AND ACETAMINOPHEN 1 TABLET: 5; 325 TABLET ORAL at 03:57

## 2025-03-14 RX ADMIN — AMLODIPINE BESYLATE 10 MG: 10 TABLET ORAL at 08:26

## 2025-03-14 RX ADMIN — METOPROLOL TARTRATE 25 MG: 25 TABLET, FILM COATED ORAL at 08:26

## 2025-03-14 RX ADMIN — GABAPENTIN 800 MG: 400 CAPSULE ORAL at 08:25

## 2025-03-14 RX ADMIN — OXYCODONE HYDROCHLORIDE AND ACETAMINOPHEN 1 TABLET: 5; 325 TABLET ORAL at 00:24

## 2025-03-14 RX ADMIN — ARIPIPRAZOLE 2 MG: 2 TABLET ORAL at 08:26

## 2025-03-14 RX ADMIN — DULOXETINE HYDROCHLORIDE 60 MG: 60 CAPSULE, DELAYED RELEASE ORAL at 08:25

## 2025-03-14 ASSESSMENT — PAIN DESCRIPTION - LOCATION
LOCATION: LEG
LOCATION: LEG

## 2025-03-14 ASSESSMENT — PAIN SCALES - GENERAL
PAINLEVEL_OUTOF10: 10
PAINLEVEL_OUTOF10: 6
PAINLEVEL_OUTOF10: 10
PAINLEVEL_OUTOF10: 8
PAINLEVEL_OUTOF10: 6

## 2025-03-14 ASSESSMENT — PAIN DESCRIPTION - DESCRIPTORS
DESCRIPTORS: BURNING
DESCRIPTORS: BURNING;STABBING

## 2025-03-14 ASSESSMENT — PAIN DESCRIPTION - ORIENTATION
ORIENTATION: LEFT
ORIENTATION: LEFT

## 2025-03-14 NOTE — GROUP NOTE
Group Therapy Note    Date: 3/14/2025    Group Start Time: 1007  Group End Time: 1045  Group Topic: Psychotherapy    [facility] Rain Orozco LISW-S        Group Therapy Note    Attendees: 6/11       Patient was offered group therapy today but declined to participate despite encouragement from staff.  1:1 was offered.        Signature:  DIYA Cerna

## 2025-03-14 NOTE — DISCHARGE INSTR - DIET

## 2025-03-14 NOTE — BH NOTE
[facility]  Discharge Note    Pt discharged with followings belongings:   Dental Appliances: None  Vision - Corrective Lenses: None, Other (Comment)  Hearing Aid: None, Other (Comment) (reading glasses)  Jewelry: None  Body Piercings Removed: Yes  Clothing: Footwear, Jacket/Coat, Hat, Pants, Sweater  Other Valuables: Money, Keys, Lighter/Matches, Cigarettes, Home Medical Equipment   Valuables sent home withpatient or returned to patient. Patient educated on aftercare instructions: yes  Information faxed to patient's home health, [facility] by staff  at 9:59 AM .Patient verbalize understanding of AVS:  yes.    Status EXAM upon discharge:  Mental Status and Behavioral Exam  Normal: No  Level of Assistance: Independent/Self  Facial Expression: Flat  Affect: Blunt  Level of Consciousness: Alert  Frequency of Checks: 4 times per hour, close  Mood:Normal: No  Mood: Depressed, Anxious  Motor Activity:Normal: No  Motor Activity: Unusual posture/gait  Eye Contact: Good  Observed Behavior: Preoccupied, Cooperative, Guarded  Sexual Misconduct History: Current - no  Preception: [place] to person, [place] to time, [place] to situation, [place] to place  Attention:Normal: Yes  Thought Processes: Unremarkable  Thought Content:Normal: No  Thought Content: Preoccupations  Depression Symptoms: Isolative, Loss of interest  Anxiety Symptoms: Generalized  [place] Symptoms: No problems reported or observed.  Hallucinations: None  Delusions: No  Memory:Normal: Yes  Insight and Judgment: No  Insight and Judgment: Poor judgment, Unmotivated    Tobacco Screening:  Practical Counseling, on admission, obi X, if applicable and completed (first 3 are required if patient doesn't refuse):            ( ) Recognizing danger situations (included triggers and roadblocks)                    ( ) Coping skills (new ways to manage stress,relaxation techniques, changing routine, distraction)                                                            ( ) Basic information about quitting (benefits of quitting, techniques in how to quit, available resources  ( ) [facility]                                                                                                                   ( x) Patient refused counseling  ( )x Patient refused referral  ( ) Patient refused prescription upon discharge  ( ) Patient has not smoked in the last 30 days    Metabolic Screening:    Lab Results   Component Value Date    LABA1C 5.5 03/04/2025       Lab Results   Component Value Date    CHOL 124 03/04/2025    CHOL 152 11/15/2023     Lab Results   Component Value Date    TRIG 74 03/04/2025    TRIG 61 11/15/2023     Lab Results   Component Value Date    HDL 38 (L) 03/04/2025    HDL 44 11/15/2023     No components found for: \"LDLCAL\"  No components found for: \"LABVLDL\"    Oksana Rodgers LPN  [place] discharged home with all belongings via hospital transport.

## 2025-03-14 NOTE — DISCHARGE SUMMARY
Provider Discharge Summary     Patient ID:  Asim Back  [id number]  55 y.o.  1970    Admit date: 3/3/2025    Discharge date and time: 3/14/2025  9:44 AM     Admitting Physician: Audie Mace MD     Discharge Physician: Bruce Monzon MD    Admission Diagnoses: Depression with suicidal ideation [F32.A, R45.851]    Discharge Diagnoses:      MDD (major depressive disorder), recurrent severe, without psychosis ([facility])     Patient Active Problem List   Diagnosis Code    MDD (major depressive disorder), recurrent severe, without psychosis ([facility]) F33.2    Suicidal ideation R45.851    Foot pain, left M79.672    Chronic osteomyelitis ([facility]) M86.60    Unilateral amputation of left foot ([facility]) S98.912A    Multiple drug resistant organism (MDRO) culture positive Z16.24    Severe episode of recurrent major depressive disorder, without psychotic features ([facility]) F33.2    Pneumonia due to infectious organism J18.9    Chronic back pain M54.9, G89.29    Decubitus ulcer of heel, bilateral L89.619, L89.629    Hypomagnesemia E83.42    Paraplegia at T9 level ([facility]) G82.20    Lung bullae ([facility]) J43.9    Chronic obstructive pulmonary disease ([facility]) J44.9    Community acquired pneumonia of right lung J18.9    Acute pneumonia J18.9    Chronic foot ulcer, left, limited to breakdown of skin ([facility]) L97.521        Admission Condition: poor    Discharged Condition: stable    Indication for Admission: threat to self    History of Present Illnes (present tense wording is of findings from admission exam and are not necessarily indicative of current findings):   Asim Back is a 55 y.o. male who has a past medical history of major depressive disorder, COPD, HTN, paraplegia, and chronic pain syndrome. Patient presented to the ED for depression with suicidal ideation. He expressed active suicidal ideation with a plan to cut his throat and does not feel safe from self. He is admitted voluntarily.      On approach, patient is cooperative and tearful. He  for Stopping:         nicotine (NICODERM CQ) 14 MG/24HR Comments:   Reason for Stopping:         oxyCODONE (OXY-IR) 30 MG immediate release tablet Comments:   Reason for Stopping:                Core Measures statement:   Not applicable    Discharge Exam:  Level of consciousness:  Within normal limits  Appearance: Street clothes, seated, with good grooming  Behavior/Motor: No abnormalities noted  Attitude toward examiner:  Cooperative, attentive, good eye contact  Speech:  spontaneous, normal rate, normal volume and well articulated  Mood:  euthymic  Affect:  Full range  Thought processes:  linear, goal directed and coherent  Thought content:  denies homicidal ideation  Suicidal Ideation:  denies suicidal ideation  Delusions:  no evidence of delusions  Perceptual Disturbance:  denies any perceptual disturbance  Cognition:  Intact  Memory: age appropriate  Insight & Judgement: fair  Medication side effects: denies     Disposition: home    Patient Instructions:   Activity: activity as tolerated  1. Patient instructed to take medications regularly and follow up with outpatient appointments.     Follow-up scheduled with Follow up appointment is scheduled for 3/20 at 11AM at Dr. Merlin [facility]          Signed:    Electronically signed by Bruce Monzon MD on 3/14/25 at 9:44 AM EDT    Time Spent on discharge is more than 35 minutes in the examination, evaluation, counseling and review of medications and discharge plan.       An electronic signature was used to authenticate this note.     **This report has been created using voice recognition software. It may contain minor errors which are inherent in voice recognition technology.**

## 2025-03-14 NOTE — BH NOTE
Patient given quit line phone number [phone] at this time, refusing to call at this time, states \" I just don't want to quit now\"-  dangers of longterm tobacco use discussed.  staff will continue to reinforce importance of smoking cessation.

## 2025-03-14 NOTE — PLAN OF CARE
Problem: Self Harm/Suicidality  Goal: Will have no self-injury during hospital stay  Description: INTERVENTIONS:  1.  Ensure constant observer at bedside with Q15M safety checks  2.  Maintain a safe environment  3.  Secure patient belongings  4.  Ensure family/visitors adhere to safety recommendations  5.  Ensure safety tray has been added to patient's diet order  6.  Every shift and PRN: Re-assess suicidal risk via Frequent Screener    Outcome: Progressing  Note: Pt is free from self-injury, denies suicidal thoughts at this time, will continue to reevaluate for suicidal ideations throughout shift, q 15 min safety checks maintained.     Problem: Safety - Adult  Goal: Free from fall injury  Outcome: Progressing  Note: Pt remains safe and free from falls or injury at this time, will continue to monitor safety throughout shift, q 15 min safety checks maintained.

## 2025-03-14 NOTE — TRANSITION OF CARE
Behavioral Health Transition Record    Patient Name: Asim Back  YOB: 1970   Medical Record Number: [id number]  Date of Admission: 3/3/2025  2:14 AM   Date of Discharge: 3/14/25    Attending Provider: Bruce Monzon MD   Discharging Provider: Bruce Monzon MD  To contact this individual call [phone] and ask the  to page.  If unavailable, ask to be transferred to Behavioral Health Provider on call.  A Behavioral Health Provider will be available on call 24/7 and during holidays.    Primary Care Provider: Vinita Agrawal APRN - NP    Allergies   Allergen Reactions    Lisinopril     Penicillins        Reason for Admission: Patient: Asim Back  MRN: [id number]  Reason for Admission to Psychiatric Unit:  Threat to self requiring 24 hour professional observation  Concerns about patient's safety in the community  Past Mental Health Diagnosis: a history of  Major Depression and Prior suicide attempt  Triggering event or precipitating factor: Housing instability  Length of time/duration of triggering event: Months  Legal Status: Voluntary     CHIEF COMPLAINT:  Suicidal ideation, depression      History obtained from: Patient, electronic medical record        Admission Diagnosis: Depression with suicidal ideation [F32.A, R45.851]    * No surgery found *    Results for orders placed or performed during the hospital encounter of 03/03/25   TSH reflex to FT4   Result Value Ref Range    TSH 1.54 0.27 - 4.20 uIU/mL   Vitamin D 25 Hydroxy   Result Value Ref Range    Vit D, 25-Hydroxy 13.3 (L) 30.0 - 100.0 ng/mL   Lipid Panel   Result Value Ref Range    Cholesterol, Total 124 0 - 199 mg/dL    HDL 38 (L) >40 mg/dL    LDL Cholesterol 71 0 - 100 mg/dL    Chol/HDL Ratio 3.3     Triglycerides 74 0 - 149 mg/dL   Hemoglobin A1C   Result Value Ref Range    Hemoglobin A1C 5.5 4.0 - 6.0 %    Estimated Avg Glucose 111 mg/dL   C-Reactive Protein   Result Value Ref Range    CRP <3.0 0.0 - 5.0 mg/L   Drug  Daily Amount: 4 tablets  Replaces: oxyCODONE-acetaminophen  MG per tablet            CHANGE how you take these medications      ARIPiprazole 2 MG tablet  Commonly known as: ABILIFY  Take 1 tablet by mouth daily  What changed:   medication strength  how much to take  when to take this  Notes to patient: Mood Stabilizer      docusate 100 MG Caps  Commonly known as: COLACE, DULCOLAX  Take 100 mg by mouth 2 times daily as needed for Constipation  What changed:   when to take this  reasons to take this  Notes to patient: To help relieve constipation     DULoxetine 60 MG extended release capsule  Commonly known as: CYMBALTA  Take 1 capsule by mouth daily  What changed:   medication strength  how much to take  Notes to patient: Antidepressant, Can help with nerve pain     folic acid 1 MG tablet  Commonly known as: FOLVITE  Take 1 tablet by mouth daily  What changed:   medication strength  how much to take  Notes to patient: Folic acid supplement             CONTINUE taking these medications      ACIDOPHILUS PROBIOTIC PO  Notes to patient: Probiotic      albuterol sulfate  (90 Base) MCG/ACT inhaler  Commonly known as: Ventolin HFA  Inhale 2 puffs into the lungs 4 times daily as needed for Wheezing     amLODIPine 10 MG tablet  Commonly known as: NORVASC  Take 1 tablet by mouth daily  Notes to patient: Helps lower high blood pressure     aspirin 81 MG chewable tablet  Notes to patient: Cardiac Disorder  Treatment      atorvastatin 10 MG tablet  Commonly known as: LIPITOR  Take 1 tablet by mouth nightly  Notes to patient: Helps lower cholesterol levels      cyclobenzaprine 10 MG tablet  Commonly known as: FLEXERIL     Dulera 50-5 MCG/ACT Aero  Generic drug: Mometasone Furo-Formoterol Fum  Inhale 2 puffs into the lungs daily  Notes to patient: Treatment of asthma      gabapentin 800 MG tablet  Commonly known as: NEURONTIN  Notes to patient: Pain relief      ibuprofen 800 MG tablet  Commonly known as: ADVIL;MOTRIN

## 2025-03-14 NOTE — GROUP NOTE
Group Therapy Note    Date: 3/14/2025    Group Start Time: 1100  Group End Time: 1130  Group Topic: Nursing    [facility] Ivet Chase, RN; RN        Group Therapy Note    Attendees: 5/11       Psych-Ed/Relapse Prevention Group Note        Date: 3/14/25 Start Time: 11am  End Time: 11:45am      Number of Participants in Group & Unit Census:  5/11    Topic: Psych Edu    Goal of Group:Interaction with Nursing students      Comments:     Patient did not participate in Psych-Ed/Relapse Prevention group, despite staff encouragement and explanation of benefits.  Patient remain seclusive to self.  Q15 minute safety checks maintained for patient safety and will continue to encourage patient to attend unit programming.     Signature:  Ivet Connelly RN